# Patient Record
Sex: MALE | Race: WHITE | NOT HISPANIC OR LATINO | Employment: OTHER | URBAN - METROPOLITAN AREA
[De-identification: names, ages, dates, MRNs, and addresses within clinical notes are randomized per-mention and may not be internally consistent; named-entity substitution may affect disease eponyms.]

---

## 2017-05-22 ENCOUNTER — ALLSCRIPTS OFFICE VISIT (OUTPATIENT)
Dept: OTHER | Facility: OTHER | Age: 62
End: 2017-05-22

## 2017-05-22 DIAGNOSIS — E78.00 PURE HYPERCHOLESTEROLEMIA: ICD-10-CM

## 2017-05-22 DIAGNOSIS — Z12.5 ENCOUNTER FOR SCREENING FOR MALIGNANT NEOPLASM OF PROSTATE: ICD-10-CM

## 2017-05-22 DIAGNOSIS — I10 ESSENTIAL (PRIMARY) HYPERTENSION: ICD-10-CM

## 2017-05-22 DIAGNOSIS — E79.0 HYPERURICEMIA WITHOUT SIGNS OF INFLAMMATORY ARTHRITIS AND TOPHACEOUS DISEASE: ICD-10-CM

## 2017-10-11 ENCOUNTER — ALLSCRIPTS OFFICE VISIT (OUTPATIENT)
Dept: OTHER | Facility: OTHER | Age: 62
End: 2017-10-11

## 2017-10-11 ENCOUNTER — TRANSCRIBE ORDERS (OUTPATIENT)
Dept: ADMINISTRATIVE | Age: 62
End: 2017-10-11

## 2017-10-11 ENCOUNTER — APPOINTMENT (OUTPATIENT)
Dept: RADIOLOGY | Age: 62
End: 2017-10-11
Payer: COMMERCIAL

## 2017-10-11 DIAGNOSIS — I10 ESSENTIAL (PRIMARY) HYPERTENSION: ICD-10-CM

## 2017-10-11 DIAGNOSIS — M10.9 GOUT: ICD-10-CM

## 2017-10-11 PROCEDURE — 73630 X-RAY EXAM OF FOOT: CPT

## 2017-10-12 ENCOUNTER — TRANSCRIBE ORDERS (OUTPATIENT)
Dept: LAB | Age: 62
End: 2017-10-12

## 2017-10-12 ENCOUNTER — APPOINTMENT (OUTPATIENT)
Dept: LAB | Age: 62
End: 2017-10-12
Payer: COMMERCIAL

## 2017-10-12 DIAGNOSIS — M10.9 GOUT: ICD-10-CM

## 2017-10-12 DIAGNOSIS — I10 ESSENTIAL (PRIMARY) HYPERTENSION: ICD-10-CM

## 2017-10-12 LAB
ANION GAP SERPL CALCULATED.3IONS-SCNC: 9 MMOL/L (ref 4–13)
BUN SERPL-MCNC: 18 MG/DL (ref 5–25)
CALCIUM SERPL-MCNC: 9.1 MG/DL (ref 8.3–10.1)
CHLORIDE SERPL-SCNC: 105 MMOL/L (ref 100–108)
CO2 SERPL-SCNC: 23 MMOL/L (ref 21–32)
CREAT SERPL-MCNC: 1.29 MG/DL (ref 0.6–1.3)
GFR SERPL CREATININE-BSD FRML MDRD: 59 ML/MIN/1.73SQ M
GLUCOSE P FAST SERPL-MCNC: 130 MG/DL (ref 65–99)
POTASSIUM SERPL-SCNC: 4.9 MMOL/L (ref 3.5–5.3)
SODIUM SERPL-SCNC: 137 MMOL/L (ref 136–145)
URATE SERPL-MCNC: 8.1 MG/DL (ref 4.2–8)

## 2017-10-12 PROCEDURE — 80048 BASIC METABOLIC PNL TOTAL CA: CPT

## 2017-10-12 PROCEDURE — 84550 ASSAY OF BLOOD/URIC ACID: CPT

## 2017-10-18 ENCOUNTER — GENERIC CONVERSION - ENCOUNTER (OUTPATIENT)
Dept: OTHER | Facility: OTHER | Age: 62
End: 2017-10-18

## 2017-11-06 ENCOUNTER — APPOINTMENT (OUTPATIENT)
Dept: LAB | Age: 62
End: 2017-11-06
Payer: COMMERCIAL

## 2017-11-06 DIAGNOSIS — Z12.5 ENCOUNTER FOR SCREENING FOR MALIGNANT NEOPLASM OF PROSTATE: ICD-10-CM

## 2017-11-06 DIAGNOSIS — E79.0 HYPERURICEMIA WITHOUT SIGNS OF INFLAMMATORY ARTHRITIS AND TOPHACEOUS DISEASE: ICD-10-CM

## 2017-11-06 DIAGNOSIS — I10 ESSENTIAL (PRIMARY) HYPERTENSION: ICD-10-CM

## 2017-11-06 DIAGNOSIS — E78.00 PURE HYPERCHOLESTEROLEMIA: ICD-10-CM

## 2017-11-06 LAB
ALBUMIN SERPL BCP-MCNC: 3.6 G/DL (ref 3.5–5)
ALP SERPL-CCNC: 95 U/L (ref 46–116)
ALT SERPL W P-5'-P-CCNC: 38 U/L (ref 12–78)
ANION GAP SERPL CALCULATED.3IONS-SCNC: 6 MMOL/L (ref 4–13)
AST SERPL W P-5'-P-CCNC: 20 U/L (ref 5–45)
BASOPHILS # BLD AUTO: 0.07 THOUSANDS/ΜL (ref 0–0.1)
BASOPHILS NFR BLD AUTO: 1 % (ref 0–1)
BILIRUB SERPL-MCNC: 0.75 MG/DL (ref 0.2–1)
BILIRUB UR QL STRIP: NEGATIVE
BUN SERPL-MCNC: 16 MG/DL (ref 5–25)
CALCIUM SERPL-MCNC: 8.9 MG/DL (ref 8.3–10.1)
CHLORIDE SERPL-SCNC: 109 MMOL/L (ref 100–108)
CHOLEST SERPL-MCNC: 173 MG/DL (ref 50–200)
CLARITY UR: CLEAR
CO2 SERPL-SCNC: 26 MMOL/L (ref 21–32)
COLOR UR: YELLOW
CREAT SERPL-MCNC: 1.28 MG/DL (ref 0.6–1.3)
EOSINOPHIL # BLD AUTO: 0.67 THOUSAND/ΜL (ref 0–0.61)
EOSINOPHIL NFR BLD AUTO: 11 % (ref 0–6)
ERYTHROCYTE [DISTWIDTH] IN BLOOD BY AUTOMATED COUNT: 12.3 % (ref 11.6–15.1)
GFR SERPL CREATININE-BSD FRML MDRD: 60 ML/MIN/1.73SQ M
GLUCOSE P FAST SERPL-MCNC: 93 MG/DL (ref 65–99)
GLUCOSE UR STRIP-MCNC: NEGATIVE MG/DL
HCT VFR BLD AUTO: 42.4 % (ref 36.5–49.3)
HDLC SERPL-MCNC: 34 MG/DL (ref 40–60)
HGB BLD-MCNC: 14.5 G/DL (ref 12–17)
HGB UR QL STRIP.AUTO: NEGATIVE
KETONES UR STRIP-MCNC: NEGATIVE MG/DL
LDLC SERPL CALC-MCNC: 108 MG/DL (ref 0–100)
LEUKOCYTE ESTERASE UR QL STRIP: NEGATIVE
LYMPHOCYTES # BLD AUTO: 2.64 THOUSANDS/ΜL (ref 0.6–4.47)
LYMPHOCYTES NFR BLD AUTO: 41 % (ref 14–44)
MCH RBC QN AUTO: 30.5 PG (ref 26.8–34.3)
MCHC RBC AUTO-ENTMCNC: 34.2 G/DL (ref 31.4–37.4)
MCV RBC AUTO: 89 FL (ref 82–98)
MONOCYTES # BLD AUTO: 0.44 THOUSAND/ΜL (ref 0.17–1.22)
MONOCYTES NFR BLD AUTO: 7 % (ref 4–12)
NEUTROPHILS # BLD AUTO: 2.56 THOUSANDS/ΜL (ref 1.85–7.62)
NEUTS SEG NFR BLD AUTO: 40 % (ref 43–75)
NITRITE UR QL STRIP: NEGATIVE
NRBC BLD AUTO-RTO: 0 /100 WBCS
PH UR STRIP.AUTO: 6.5 [PH] (ref 4.5–8)
PLATELET # BLD AUTO: 198 THOUSANDS/UL (ref 149–390)
PMV BLD AUTO: 10.8 FL (ref 8.9–12.7)
POTASSIUM SERPL-SCNC: 4.6 MMOL/L (ref 3.5–5.3)
PROT SERPL-MCNC: 7.3 G/DL (ref 6.4–8.2)
PROT UR STRIP-MCNC: NEGATIVE MG/DL
PSA SERPL-MCNC: 0.3 NG/ML (ref 0–4)
RBC # BLD AUTO: 4.75 MILLION/UL (ref 3.88–5.62)
SODIUM SERPL-SCNC: 141 MMOL/L (ref 136–145)
SP GR UR STRIP.AUTO: 1.02 (ref 1–1.03)
TRIGL SERPL-MCNC: 156 MG/DL
TSH SERPL DL<=0.05 MIU/L-ACNC: 3.09 UIU/ML (ref 0.36–3.74)
URATE SERPL-MCNC: 7.7 MG/DL (ref 4.2–8)
UROBILINOGEN UR QL STRIP.AUTO: 1 E.U./DL
WBC # BLD AUTO: 6.4 THOUSAND/UL (ref 4.31–10.16)

## 2017-11-06 PROCEDURE — 80061 LIPID PANEL: CPT

## 2017-11-06 PROCEDURE — 85025 COMPLETE CBC W/AUTO DIFF WBC: CPT

## 2017-11-06 PROCEDURE — G0103 PSA SCREENING: HCPCS

## 2017-11-06 PROCEDURE — 36415 COLL VENOUS BLD VENIPUNCTURE: CPT

## 2017-11-06 PROCEDURE — 80053 COMPREHEN METABOLIC PANEL: CPT

## 2017-11-06 PROCEDURE — 84443 ASSAY THYROID STIM HORMONE: CPT

## 2017-11-06 PROCEDURE — 84550 ASSAY OF BLOOD/URIC ACID: CPT

## 2017-11-06 PROCEDURE — 81003 URINALYSIS AUTO W/O SCOPE: CPT

## 2017-11-29 ENCOUNTER — ALLSCRIPTS OFFICE VISIT (OUTPATIENT)
Dept: OTHER | Facility: OTHER | Age: 62
End: 2017-11-29

## 2017-11-29 DIAGNOSIS — E79.0 HYPERURICEMIA WITHOUT SIGNS OF INFLAMMATORY ARTHRITIS AND TOPHACEOUS DISEASE: ICD-10-CM

## 2017-11-29 DIAGNOSIS — I10 ESSENTIAL (PRIMARY) HYPERTENSION: ICD-10-CM

## 2018-01-13 VITALS
HEART RATE: 80 BPM | SYSTOLIC BLOOD PRESSURE: 130 MMHG | RESPIRATION RATE: 16 BRPM | WEIGHT: 255.4 LBS | HEIGHT: 70 IN | DIASTOLIC BLOOD PRESSURE: 70 MMHG | OXYGEN SATURATION: 98 % | BODY MASS INDEX: 36.56 KG/M2 | TEMPERATURE: 99.6 F

## 2018-01-14 VITALS
HEIGHT: 70 IN | HEART RATE: 78 BPM | OXYGEN SATURATION: 98 % | WEIGHT: 257.4 LBS | DIASTOLIC BLOOD PRESSURE: 68 MMHG | SYSTOLIC BLOOD PRESSURE: 118 MMHG | BODY MASS INDEX: 36.85 KG/M2 | TEMPERATURE: 98.4 F

## 2018-01-14 VITALS
BODY MASS INDEX: 36 KG/M2 | TEMPERATURE: 98.6 F | HEIGHT: 71 IN | HEART RATE: 84 BPM | DIASTOLIC BLOOD PRESSURE: 66 MMHG | OXYGEN SATURATION: 97 % | WEIGHT: 257.13 LBS | SYSTOLIC BLOOD PRESSURE: 118 MMHG

## 2018-01-18 NOTE — MISCELLANEOUS
Message  Return to work or school:   Yoni Villalobos is under my professional care  He was seen in my office on 9/14/16   He is able to return to work on  9/16/16      His absence due to illness          Future Appointments    Signatures   Electronically signed by : Conner De Leon MD; Sep 14 2016  6:10PM EST                       (Author)

## 2018-01-22 VITALS
OXYGEN SATURATION: 99 % | SYSTOLIC BLOOD PRESSURE: 122 MMHG | TEMPERATURE: 98.7 F | HEART RATE: 72 BPM | BODY MASS INDEX: 35.7 KG/M2 | WEIGHT: 255 LBS | HEIGHT: 71 IN | DIASTOLIC BLOOD PRESSURE: 76 MMHG

## 2018-03-05 ENCOUNTER — APPOINTMENT (OUTPATIENT)
Dept: LAB | Facility: MEDICAL CENTER | Age: 63
End: 2018-03-05
Payer: COMMERCIAL

## 2018-03-05 DIAGNOSIS — I10 ESSENTIAL (PRIMARY) HYPERTENSION: ICD-10-CM

## 2018-03-05 DIAGNOSIS — E79.0 HYPERURICEMIA WITHOUT SIGNS OF INFLAMMATORY ARTHRITIS AND TOPHACEOUS DISEASE: ICD-10-CM

## 2018-03-05 LAB
ANION GAP SERPL CALCULATED.3IONS-SCNC: 6 MMOL/L (ref 4–13)
BUN SERPL-MCNC: 24 MG/DL (ref 5–25)
CALCIUM SERPL-MCNC: 8.7 MG/DL (ref 8.3–10.1)
CHLORIDE SERPL-SCNC: 107 MMOL/L (ref 100–108)
CO2 SERPL-SCNC: 27 MMOL/L (ref 21–32)
CREAT SERPL-MCNC: 1.23 MG/DL (ref 0.6–1.3)
GFR SERPL CREATININE-BSD FRML MDRD: 63 ML/MIN/1.73SQ M
GLUCOSE SERPL-MCNC: 84 MG/DL (ref 65–140)
POTASSIUM SERPL-SCNC: 4.8 MMOL/L (ref 3.5–5.3)
SODIUM SERPL-SCNC: 140 MMOL/L (ref 136–145)
URATE SERPL-MCNC: 5.9 MG/DL (ref 4.2–8)

## 2018-03-05 PROCEDURE — 84550 ASSAY OF BLOOD/URIC ACID: CPT

## 2018-03-05 PROCEDURE — 80048 BASIC METABOLIC PNL TOTAL CA: CPT

## 2018-03-05 PROCEDURE — 36415 COLL VENOUS BLD VENIPUNCTURE: CPT

## 2018-03-28 ENCOUNTER — OFFICE VISIT (OUTPATIENT)
Dept: INTERNAL MEDICINE CLINIC | Facility: CLINIC | Age: 63
End: 2018-03-28
Payer: COMMERCIAL

## 2018-03-28 VITALS
OXYGEN SATURATION: 99 % | WEIGHT: 264 LBS | HEART RATE: 90 BPM | DIASTOLIC BLOOD PRESSURE: 78 MMHG | RESPIRATION RATE: 20 BRPM | HEIGHT: 72 IN | TEMPERATURE: 98.2 F | SYSTOLIC BLOOD PRESSURE: 140 MMHG | BODY MASS INDEX: 35.76 KG/M2

## 2018-03-28 DIAGNOSIS — Z11.59 NEED FOR HEPATITIS C SCREENING TEST: ICD-10-CM

## 2018-03-28 DIAGNOSIS — I10 BENIGN ESSENTIAL HYPERTENSION: ICD-10-CM

## 2018-03-28 DIAGNOSIS — E66.09 CLASS 2 OBESITY DUE TO EXCESS CALORIES WITHOUT SERIOUS COMORBIDITY WITH BODY MASS INDEX (BMI) OF 36.0 TO 36.9 IN ADULT: ICD-10-CM

## 2018-03-28 DIAGNOSIS — M10.9 ACUTE GOUTY ARTHRITIS: ICD-10-CM

## 2018-03-28 DIAGNOSIS — E79.0 HYPERURICEMIA: Primary | ICD-10-CM

## 2018-03-28 DIAGNOSIS — E78.00 HYPERCHOLESTEROLEMIA: ICD-10-CM

## 2018-03-28 DIAGNOSIS — I10 HYPERTENSION, UNSPECIFIED TYPE: Primary | ICD-10-CM

## 2018-03-28 DIAGNOSIS — Z87.39 HISTORY OF GOUT: ICD-10-CM

## 2018-03-28 PROBLEM — E66.812 CLASS 2 OBESITY DUE TO EXCESS CALORIES WITHOUT SERIOUS COMORBIDITY WITH BODY MASS INDEX (BMI) OF 36.0 TO 36.9 IN ADULT: Status: ACTIVE | Noted: 2018-03-28

## 2018-03-28 PROCEDURE — 99214 OFFICE O/P EST MOD 30 MIN: CPT | Performed by: PHYSICIAN ASSISTANT

## 2018-03-28 RX ORDER — OLMESARTAN MEDOXOMIL 20 MG/1
20 TABLET ORAL DAILY
Qty: 90 TABLET | Refills: 1 | Status: SHIPPED | OUTPATIENT
Start: 2018-03-28 | End: 2018-12-05 | Stop reason: SDUPTHER

## 2018-03-28 RX ORDER — DOXAZOSIN 8 MG/1
8 TABLET ORAL DAILY
Qty: 90 TABLET | Refills: 1 | Status: SHIPPED | OUTPATIENT
Start: 2018-03-28 | End: 2018-03-28 | Stop reason: SDUPTHER

## 2018-03-28 RX ORDER — AMPICILLIN TRIHYDRATE 250 MG
2 CAPSULE ORAL DAILY
COMMUNITY
End: 2022-03-11 | Stop reason: ALTCHOICE

## 2018-03-28 RX ORDER — CHLORAL HYDRATE 500 MG
1 CAPSULE ORAL DAILY
COMMUNITY

## 2018-03-28 RX ORDER — ALLOPURINOL 300 MG/1
1 TABLET ORAL DAILY
COMMUNITY
Start: 2017-11-29 | End: 2018-03-28 | Stop reason: SDUPTHER

## 2018-03-28 RX ORDER — DOXAZOSIN 8 MG/1
1 TABLET ORAL DAILY
COMMUNITY
End: 2018-03-28 | Stop reason: SDUPTHER

## 2018-03-28 RX ORDER — OLMESARTAN MEDOXOMIL 20 MG/1
TABLET ORAL
COMMUNITY
Start: 2015-07-16 | End: 2018-03-28 | Stop reason: SDUPTHER

## 2018-03-28 RX ORDER — ALLOPURINOL 300 MG/1
300 TABLET ORAL DAILY
Qty: 90 TABLET | Refills: 1 | Status: SHIPPED | OUTPATIENT
Start: 2018-03-28 | End: 2018-12-03 | Stop reason: SDUPTHER

## 2018-03-28 RX ORDER — DOXAZOSIN 8 MG/1
8 TABLET ORAL DAILY
Qty: 90 TABLET | Refills: 0 | Status: SHIPPED | OUTPATIENT
Start: 2018-03-28 | End: 2019-02-13 | Stop reason: SDUPTHER

## 2018-03-28 RX ORDER — DOXAZOSIN 8 MG/1
8 TABLET ORAL DAILY
Qty: 90 TABLET | Refills: 1 | Status: CANCELLED | OUTPATIENT
Start: 2018-03-28

## 2018-03-28 NOTE — ASSESSMENT & PLAN NOTE
Review previous lipid panel with patient  Encouraged low-fat low-cholesterol diet  Patient currently is taking omega-3 fatty oils and red yeast rice  Will follow lipid panel with next lab set  Over-the-counter prep plant sterols can also help as a natural way to lower cholesterol

## 2018-03-28 NOTE — ASSESSMENT & PLAN NOTE
Blood pressure previously well controlled on medications  Patient taking Cardura  And Benicar  Encouraged weight loss low-salt diet  Will not adjust blood pressure medications at this time however discussed if blood pressure is not at goal at next visit we will increase Benicar to 40 mg daily  Patient is agreeable  Labs to be done prior to next visit

## 2018-03-28 NOTE — PATIENT INSTRUCTIONS
Benign essential hypertension  Blood pressure previously well controlled on medications  Patient taking Cardura  And Benicar  Encouraged weight loss low-salt diet  Will not adjust blood pressure medications at this time however discussed if blood pressure is not at goal at next visit we will increase Benicar to 40 mg daily  Patient is agreeable  Labs to be done prior to next visit  History of gout    Acute gouty flare has since resolved  Uric acid recently checked  Treated to goal on allopurinol  Renal function stable  Increased hydration and fluids to prevent gout attacks  Discussed gout diet handout given    Hypercholesterolemia   Review previous lipid panel with patient  Encouraged low-fat low-cholesterol diet  Patient currently is taking omega-3 fatty oils and red yeast rice  Will follow lipid panel with next lab set  Over-the-counter prep plant sterols can also help as a natural way to lower cholesterol  Hyperuricemia  Educated on cause of gout  Discussed importance of diet and lifestyle modifications along with medications to improve symptoms and prevent reoccurrence  Rest, ice, elevate and avoid pressure on the affected area  Take meds as directed  Follow diet below  What to do:   Drink plenty of fluids  An increase in water consumption has been linked to fewer gout attacks  Aim for eight to 16 glasses of fluids a day with at least half of that as water   Limit purine-rich foods (organ meats, beef, lamb, pork, shellfish)   Low carb vegetables    Low-fat, nonfat daily   Limit daily proteins from lean meat, fish and poultry to 4 to 6 ounces  Add protein to your diet with low-fat or fat-free dairy products, such as low-fat yogurt or skim milk, which are associated with reduced uric acid levels   Supplement Vitamin C may help lower uric acid levels  Things to Avoid:   Avoid alcohol (especially beer) which increases uric acid and contributes to dehydration   Avoid beverages sweetened with high fructose corn syrup   Avoid organ and glandular meats  Avoid meats such as liver, kidney and sweetbreads, which have high purine levels and contribute to high blood levels of uric acid   Avoid Selected seafood which are higher in purines than others: anchovies, herring, sardines, mussels, scallops, trout, osvaldo, mackerel and tuna    Gout   AMBULATORY CARE:   Gout  is a form of arthritis that causes severe joint pain and stiffness  Acute gout pain starts suddenly, gets worse quickly, and stops on its own  Acute gout can become chronic and cause permanent damage to your joints  Seek care immediately if:   · You have severe pain in one or more of your joints that you cannot tolerate  · You have a fever or redness that spreads beyond the joint area  Contact your healthcare provider if:   · You have new symptoms, such as a rash, after you start gout treatment  · Your joint pain and swelling do not go away, even after treatment  · You are not urinating as much or as often as you usually do  · You have trouble taking your gout medicines  · You have questions or concerns about your condition or care  Stages of gout:   · Hyperuricemia  starts with high levels of uric acid  Hyperuricemia is not gout, but it increases your risk for gout  You may have no symptoms at this stage, and it usually does not need treatment  · Acute gouty arthritis  starts with a sudden attack of pain and swelling, usually in 1 joint  The attack may last from a few days to 2 weeks  · Intercritical gout  is the time between attacks  You may go months or years without another attack  You will not have joint pain or stiffness, but this does not mean your gout is cured  You will still need treatment to prevent chronic gout  · Chronic tophaceous gout  develops if gout is not treated  Large amounts of uric acid crystals, called tophi, collect around your joints   The crystals can destroy or deform the joints  Gout attacks occur more often, and last hours to weeks  More than 1 joint may be painful and swollen  At this stage, gout symptoms do not go away on their own  Treatment  can make your symptoms stop sooner, prevent attacks, and decrease your risk of joint damage  You may need any of the following:  · Medicines:      ¨ NSAIDs , such as ibuprofen, help decrease swelling, pain, and fever  This medicine is available with or without a doctor's order  NSAIDs can cause stomach bleeding or kidney problems in certain people  If you take blood thinner medicine, always ask your healthcare provider if NSAIDs are safe for you  Always read the medicine label and follow directions  ¨ Gout medicine  decreases joint pain and swelling  It may also be given to prevent new gout attacks  ¨ Steroids  reduce inflammation and can help your joint stiffness and pain during gout attacks  ¨ Uric acid medicine  may be given to reduce the amount of uric acid your body makes  Some medicines may help you pass more uric acid when you urinate  ¨ Take your medicine as directed  Contact your healthcare provider if you think your medicine is not helping or if you have side effects  Tell him or her if you are allergic to any medicine  Keep a list of the medicines, vitamins, and herbs you take  Include the amounts, and when and why you take them  Bring the list or the pill bottles to follow-up visits  Carry your medicine list with you in case of an emergency  · Surgery  called a bone graft may be needed for tophi that are painful or infected  Bone in the joint may be replaced with bone taken from another place in your body  Ask your healthcare provider for more information about bone graft surgery  Manage gout:   · Rest your painful joint so it can heal   Your healthcare provider may recommend crutches or a walker if the affected joint is in a leg  · Apply ice to your joint    Ice decreases pain and swelling  Use an ice pack, or put crushed ice in a plastic bag  Cover the ice pack or bag with a towel before you apply it to your painful joint  Apply ice for 15 to 20 minutes every hour, or as directed  · Elevate your joint  Elevation helps reduce swelling and pain  Raise your joint above the level of your heart as often as you can  Prop your painful joint on pillows to keep it above your heart comfortably  · Go to physical therapy if directed  A physical therapist can teach you exercises to improve flexibility and range of motion  Prevent gout attacks:   · Do not eat high-purine foods  These foods include meats, seafood, asparagus, spinach, cauliflower, and some types of beans  Healthcare providers may tell you to eat more low-fat milk products, such as yogurt  Milk products may decrease your risk for gout attacks  Vitamin C and coffee may also help  Your healthcare provider or dietitian can help you create a meal plan  · Drink more liquids as directed  Liquids such as water help remove uric acid from your body  Ask how much liquid to drink each day and which liquids are best for you  · Manage your weight  Weight loss may decrease the amount of uric acid in your body  Exercise can help you lose weight  Talk to your healthcare provider about the best exercises for you  · Control your blood sugar level if you have diabetes  Keep your blood sugar level in a normal range  This can help prevent gout attacks  · Limit or do not drink alcohol as directed  Alcohol can trigger a gout attack  Alcohol also increases your risk for dehydration  Ask your healthcare provider if alcohol is safe for you  Follow up with your healthcare provider as directed:  Write down your questions so you remember to ask them during your visits  © 2017 [employer]  Information is for End User's use only and may not be sold, redistributed or otherwise used for commercial purposes   All illustrations and images included in CareNotes® are the copyrighted property of [employer]  or [employer]  The above information is an  only  It is not intended as medical advice for individual conditions or treatments  Talk to your doctor, nurse or pharmacist before following any medical regimen to see if it is safe and effective for you

## 2018-03-28 NOTE — ASSESSMENT & PLAN NOTE
Acute gouty flare has since resolved  Uric acid recently checked  Treated to goal on allopurinol  Renal function stable  Increased hydration and fluids to prevent gout attacks    Discussed gout diet handout given

## 2018-03-28 NOTE — PROGRESS NOTES
[facility] [place]  Standard Office Visit  Patient ID: Dannielle Figures  : 1955  Age/Gender: 58 y o  male     DATE: 3/28/2018      Assessment/Plan:    Benign essential hypertension  Blood pressure previously well controlled on medications  Patient taking Cardura  And Benicar  Encouraged weight loss low-salt diet  Will not adjust blood pressure medications at this time however discussed if blood pressure is not at goal at next visit we will increase Benicar to 40 mg daily  Patient is agreeable  Labs to be done prior to next visit  History of gout    Acute gouty flare has since resolved  Uric acid recently checked  Treated to goal on allopurinol  Renal function stable  Increased hydration and fluids to prevent gout attacks  Discussed gout diet handout given    Hypercholesterolemia   Review previous lipid panel with patient  Encouraged low-fat low-cholesterol diet  Patient currently is taking omega-3 fatty oils and red yeast rice  Will follow lipid panel with next lab set  Over-the-counter prep plant sterols can also help as a natural way to lower cholesterol  Hyperuricemia  Hydrate well, follow gout diet to avoid trigger foods  Reprot if sx develop  Class 2 obesity due to excess calories without serious comorbidity with body mass index (BMI) of 36 0 to 36 9 in adult  Set 5 lb weight loss goal for next visit  Discussed diet and lifestyle to help  Need for hepatitis C screening test   Had screening blood test       Diagnoses and all orders for this visit:    Hyperuricemia  -     Uric acid; Future  -     allopurinol (ZYLOPRIM) 300 mg tablet; Take 1 tablet (300 mg total) by mouth daily    Hypercholesterolemia  -     Comprehensive metabolic panel; Future  -     Lipid Panel with Direct LDL reflex; Future  -     CBC and differential; Future    Benign essential hypertension  -     Comprehensive metabolic panel;  Future  - Lipid Panel with Direct LDL reflex; Future  -     CBC and differential; Future  -     Discontinue: doxazosin (CARDURA) 8 MG tablet; Take 1 tablet (8 mg total) by mouth daily  -     doxazosin (CARDURA) 8 MG tablet; Take 1 tablet (8 mg total) by mouth daily  -     olmesartan (BENICAR) 20 mg tablet; Take 1 tablet (20 mg total) by mouth daily    Acute gouty arthritis    History of gout  -     Uric acid; Future  -     allopurinol (ZYLOPRIM) 300 mg tablet; Take 1 tablet (300 mg total) by mouth daily    Class 2 obesity due to excess calories without serious comorbidity with body mass index (BMI) of 36 0 to 36 9 in adult  -     Comprehensive metabolic panel; Future  -     Lipid Panel with Direct LDL reflex; Future    Need for hepatitis C screening test  -     Hepatitis C antibody; Future    Other orders  -     Discontinue: allopurinol (ZYLOPRIM) 300 mg tablet; Take 1 tablet by mouth daily  -     aspirin 81 MG tablet; Take 1 tablet by mouth daily  -     Discontinue: olmesartan (BENICAR) 20 mg tablet;   -     Discontinue: doxazosin (CARDURA) 8 MG tablet; Take 1 tablet by mouth daily  -     Omega-3 1000 MG CAPS; Take by mouth  -     Red Yeast Rice 600 MG CAPS; Take by mouth          Subjective:   Chief Complaint   Patient presents with    Follow-up     Pt is here for a follow up for blood work  Madyson Rodriguez  is a 58 y o  male who presents to the office on 3/28/2018 for     59 yo male for follow up  Notes he had ETOH 1-2 times per week  Last gout flare up was in 1/2018, resolved  Notes he has been taking allopurinol  Feeling better  Taking BP meds as directed  Notes he previously was talked to about starting a statin  He elected to continue with red yeast rice which she has been on for some time  Tries to manage cholesterol with diet and redness rise  Wants to know what foods he can eat to prevent gout  Notes he needs to work on his diet as he has recently gained some weight    No other complaints or concerns at this time      Hypertension   This is a chronic problem  The current episode started more than 1 year ago  The problem has been waxing and waning since onset  The problem is controlled  Pertinent negatives include no anxiety, blurred vision (recently to eye doctor ), chest pain, headaches, palpitations, peripheral edema or shortness of breath  Risk factors for coronary artery disease include male gender, obesity and diabetes mellitus  Past treatments include alpha 1 blockers and angiotensin blockers  The current treatment provides mild improvement  There are no compliance problems  There is no history of angina, kidney disease, CAD/MI or CVA  The following portions of the patient's history were reviewed and updated as appropriate: allergies, current medications, past medical history, past social history, past surgical history and problem list     Review of Systems   Eyes: Negative for blurred vision (recently to eye doctor  )  Respiratory: Negative for cough, shortness of breath and wheezing  Cardiovascular: Negative for chest pain and palpitations  Gastrointestinal: Negative for abdominal distention, diarrhea, nausea and vomiting  Genitourinary: Negative for dysuria  Has been on Cardura for some time  No urinary symptoms at this time   Musculoskeletal:        Intermittent gout flare ups as discussed in HPI  No acute gout at this time  Neurological: Negative for headaches           Patient Active Problem List   Diagnosis    Benign essential hypertension    Hypercholesterolemia    Hyperuricemia    History of gout    Need for hepatitis C screening test    Class 2 obesity due to excess calories without serious comorbidity with body mass index (BMI) of 36 0 to 36 9 in adult       Past Medical History:   Diagnosis Date    Blurred vision     Last assessed - 1/28/15    CAP (community acquired pneumonia)     Last assessed - 9/20/16    Hemorrhoids     Last assessed - 11/23/15    HTN (hypertension)     Hypercholesterolemia     IFG (impaired fasting glucose)     Last assessed - 11/18/14    Rotator cuff tendinitis     Last assessed - 6/17/15       Past Surgical History:   Procedure Laterality Date    APPENDECTOMY      Resolved - 1/24/09    COLONOSCOPY      WISDOM TOOTH EXTRACTION           Current Outpatient Prescriptions:     allopurinol (ZYLOPRIM) 300 mg tablet, Take 1 tablet (300 mg total) by mouth daily, Disp: 90 tablet, Rfl: 1    aspirin 81 MG tablet, Take 1 tablet by mouth daily, Disp: , Rfl:     doxazosin (CARDURA) 8 MG tablet, Take 1 tablet (8 mg total) by mouth daily, Disp: 90 tablet, Rfl: 0    olmesartan (BENICAR) 20 mg tablet, Take 1 tablet (20 mg total) by mouth daily, Disp: 90 tablet, Rfl: 1    Omega-3 1000 MG CAPS, Take by mouth, Disp: , Rfl:     Red Yeast Rice 600 MG CAPS, Take by mouth, Disp: , Rfl:     Allergies   Allergen Reactions    Penicillins Hives    Pollen Extract        Social History     Social History    Marital status: /Civil Union     Spouse name: N/A    Number of children: N/A    Years of education: N/A     Occupational History    [employer]     Social History Main Topics    Smoking status: Never Smoker    Smokeless tobacco: Never Used    Alcohol use Yes      Comment: Social     Drug use: No    Sexual activity: Not Asked     Other Topics Concern    None     Social History Narrative    Travel Hx - Pt denies being out of the country during 1/2014-11/18/2014           Family History   Problem Relation Age of Onset    Hyperlipidemia Mother     Hypertension Mother     Pulmonary embolism Mother     Diabetes type II Mother     Hyperlipidemia Father     Hypertension Father     Prostate cancer Father     Diabetes type II Father        Patient Care Team:  Brannon Alex MD as PCP - General    Immunization History   Administered Date(s) Administered    Influenza Quadrivalent Preservative Free 3 years and older IM 11/29/2017    Influenza Quadrivalent, 6-35 Months IM 11/21/2016    Td (adult), adsorbed 09/01/2007        Objective:  Vitals:    03/28/18 1527 03/28/18 1548   BP: 140/84 140/78   BP Location: Left arm    Patient Position: Sitting    Cuff Size: Large    Pulse: 90    Resp: 20    Temp: 98 2 °F (36 8 °C)    TempSrc: Oral    SpO2: 99%    Weight: 120 kg (264 lb)    Height: 5' 11 5" (1 816 m)      Wt Readings from Last 3 Encounters:   03/28/18 120 kg (264 lb)   11/29/17 117 kg (257 lb 2 oz)   10/18/17 116 kg (255 lb)     Body mass index is 36 31 kg/m²  No exam data present       Physical Exam   Constitutional: He is oriented to person, place, and time  He appears well-developed and well-nourished  No distress  HENT:   Head: Normocephalic and atraumatic  Mouth/Throat: No oropharyngeal exudate  Eyes: Conjunctivae are normal  Right eye exhibits no discharge  Left eye exhibits no discharge  No scleral icterus  Neck: Neck supple  Cardiovascular: Normal rate, regular rhythm and normal heart sounds  No murmur heard  Pulmonary/Chest: Effort normal and breath sounds normal  No respiratory distress  He has no wheezes  Clear to auscultation throughout   Abdominal: Soft  Bowel sounds are normal  There is no tenderness  Soft nontender nondistended   Musculoskeletal: He exhibits no edema  No lower extremity edema  Prominent MCP head left great toe without erythema or tenderness to palpation   Neurological: He is alert and oriented to person, place, and time  Skin: Skin is warm and dry  He is not diaphoretic  No causative break in the skin ulceration on the feet   Psychiatric: He has a normal mood and affect  His behavior is normal  Thought content normal    Nursing note and vitals reviewed            Health Maintenance   Topic Date Due    HIV SCREENING  1955    Hepatitis C Screening  1955    PNEUMOCOCCAL POLYSACCHARIDE VACCINE AGE 2-64 HIGH RISK  08/06/1957    DTaP,Tdap,and Td Vaccines (1 - Tdap) 09/02/2007  Depression Screening PHQ-9  11/29/2018    COLONOSCOPY  11/20/2019    INFLUENZA VACCINE  Completed     Future Appointments  Date Time Provider Department Center   10/1/2018 4:30 PM Sy Matute PA-C [address] [place] [address]  Elliott Villagomez [facility]

## 2018-06-06 NOTE — TELEPHONE ENCOUNTER
Pt called requesting refill of Benicar  Rx sent for 90 days and 1 refill on 3/28/18 to [facility] in [place]  Pt stills has refill remaining

## 2018-09-26 ENCOUNTER — APPOINTMENT (OUTPATIENT)
Dept: LAB | Age: 63
End: 2018-09-26
Payer: COMMERCIAL

## 2018-09-26 DIAGNOSIS — Z87.39 HISTORY OF GOUT: ICD-10-CM

## 2018-09-26 DIAGNOSIS — I10 BENIGN ESSENTIAL HYPERTENSION: ICD-10-CM

## 2018-09-26 DIAGNOSIS — E66.09 CLASS 2 OBESITY DUE TO EXCESS CALORIES WITHOUT SERIOUS COMORBIDITY WITH BODY MASS INDEX (BMI) OF 36.0 TO 36.9 IN ADULT: ICD-10-CM

## 2018-09-26 DIAGNOSIS — E79.0 HYPERURICEMIA: ICD-10-CM

## 2018-09-26 DIAGNOSIS — Z11.59 NEED FOR HEPATITIS C SCREENING TEST: ICD-10-CM

## 2018-09-26 DIAGNOSIS — E78.00 HYPERCHOLESTEROLEMIA: ICD-10-CM

## 2018-09-26 LAB
ALBUMIN SERPL BCP-MCNC: 3.5 G/DL (ref 3.5–5)
ALP SERPL-CCNC: 102 U/L (ref 46–116)
ALT SERPL W P-5'-P-CCNC: 49 U/L (ref 12–78)
ANION GAP SERPL CALCULATED.3IONS-SCNC: 6 MMOL/L (ref 4–13)
AST SERPL W P-5'-P-CCNC: 28 U/L (ref 5–45)
BASOPHILS # BLD AUTO: 0.08 THOUSANDS/ΜL (ref 0–0.1)
BASOPHILS NFR BLD AUTO: 1 % (ref 0–1)
BILIRUB SERPL-MCNC: 1.12 MG/DL (ref 0.2–1)
BUN SERPL-MCNC: 15 MG/DL (ref 5–25)
CALCIUM SERPL-MCNC: 8.8 MG/DL (ref 8.3–10.1)
CHLORIDE SERPL-SCNC: 108 MMOL/L (ref 100–108)
CHOLEST SERPL-MCNC: 153 MG/DL (ref 50–200)
CO2 SERPL-SCNC: 26 MMOL/L (ref 21–32)
CREAT SERPL-MCNC: 1.21 MG/DL (ref 0.6–1.3)
EOSINOPHIL # BLD AUTO: 0.48 THOUSAND/ΜL (ref 0–0.61)
EOSINOPHIL NFR BLD AUTO: 7 % (ref 0–6)
ERYTHROCYTE [DISTWIDTH] IN BLOOD BY AUTOMATED COUNT: 13.2 % (ref 11.6–15.1)
GFR SERPL CREATININE-BSD FRML MDRD: 63 ML/MIN/1.73SQ M
GLUCOSE P FAST SERPL-MCNC: 97 MG/DL (ref 65–99)
HCT VFR BLD AUTO: 41.4 % (ref 36.5–49.3)
HCV AB SER QL: NORMAL
HDLC SERPL-MCNC: 35 MG/DL (ref 40–60)
HGB BLD-MCNC: 13.9 G/DL (ref 12–17)
IMM GRANULOCYTES # BLD AUTO: 0.01 THOUSAND/UL (ref 0–0.2)
IMM GRANULOCYTES NFR BLD AUTO: 0 % (ref 0–2)
LDLC SERPL CALC-MCNC: 96 MG/DL (ref 0–100)
LYMPHOCYTES # BLD AUTO: 1.99 THOUSANDS/ΜL (ref 0.6–4.47)
LYMPHOCYTES NFR BLD AUTO: 28 % (ref 14–44)
MCH RBC QN AUTO: 31.8 PG (ref 26.8–34.3)
MCHC RBC AUTO-ENTMCNC: 33.6 G/DL (ref 31.4–37.4)
MCV RBC AUTO: 95 FL (ref 82–98)
MONOCYTES # BLD AUTO: 0.55 THOUSAND/ΜL (ref 0.17–1.22)
MONOCYTES NFR BLD AUTO: 8 % (ref 4–12)
NEUTROPHILS # BLD AUTO: 4.09 THOUSANDS/ΜL (ref 1.85–7.62)
NEUTS SEG NFR BLD AUTO: 56 % (ref 43–75)
NRBC BLD AUTO-RTO: 0 /100 WBCS
PLATELET # BLD AUTO: 181 THOUSANDS/UL (ref 149–390)
PMV BLD AUTO: 11.5 FL (ref 8.9–12.7)
POTASSIUM SERPL-SCNC: 4.5 MMOL/L (ref 3.5–5.3)
PROT SERPL-MCNC: 7 G/DL (ref 6.4–8.2)
RBC # BLD AUTO: 4.37 MILLION/UL (ref 3.88–5.62)
SODIUM SERPL-SCNC: 140 MMOL/L (ref 136–145)
TRIGL SERPL-MCNC: 109 MG/DL
URATE SERPL-MCNC: 4.8 MG/DL (ref 4.2–8)
WBC # BLD AUTO: 7.2 THOUSAND/UL (ref 4.31–10.16)

## 2018-09-26 PROCEDURE — 80061 LIPID PANEL: CPT

## 2018-09-26 PROCEDURE — 85025 COMPLETE CBC W/AUTO DIFF WBC: CPT

## 2018-09-26 PROCEDURE — 36415 COLL VENOUS BLD VENIPUNCTURE: CPT

## 2018-09-26 PROCEDURE — 84550 ASSAY OF BLOOD/URIC ACID: CPT

## 2018-09-26 PROCEDURE — 86803 HEPATITIS C AB TEST: CPT

## 2018-09-26 PROCEDURE — 80053 COMPREHEN METABOLIC PANEL: CPT

## 2018-10-05 ENCOUNTER — OFFICE VISIT (OUTPATIENT)
Dept: INTERNAL MEDICINE CLINIC | Facility: CLINIC | Age: 63
End: 2018-10-05
Payer: COMMERCIAL

## 2018-10-05 VITALS
HEART RATE: 96 BPM | DIASTOLIC BLOOD PRESSURE: 62 MMHG | BODY MASS INDEX: 37.65 KG/M2 | TEMPERATURE: 98.2 F | SYSTOLIC BLOOD PRESSURE: 130 MMHG | HEIGHT: 70 IN | OXYGEN SATURATION: 98 % | WEIGHT: 263 LBS

## 2018-10-05 DIAGNOSIS — E79.0 HYPERURICEMIA: ICD-10-CM

## 2018-10-05 DIAGNOSIS — E78.00 HYPERCHOLESTEROLEMIA: ICD-10-CM

## 2018-10-05 DIAGNOSIS — Z87.39 HISTORY OF GOUT: ICD-10-CM

## 2018-10-05 DIAGNOSIS — E66.09 CLASS 2 OBESITY DUE TO EXCESS CALORIES WITHOUT SERIOUS COMORBIDITY WITH BODY MASS INDEX (BMI) OF 36.0 TO 36.9 IN ADULT: ICD-10-CM

## 2018-10-05 DIAGNOSIS — Z11.59 NEED FOR HEPATITIS C SCREENING TEST: Primary | ICD-10-CM

## 2018-10-05 DIAGNOSIS — I10 BENIGN ESSENTIAL HYPERTENSION: ICD-10-CM

## 2018-10-05 PROCEDURE — 1036F TOBACCO NON-USER: CPT | Performed by: PHYSICIAN ASSISTANT

## 2018-10-05 PROCEDURE — 3078F DIAST BP <80 MM HG: CPT | Performed by: PHYSICIAN ASSISTANT

## 2018-10-05 PROCEDURE — 3075F SYST BP GE 130 - 139MM HG: CPT | Performed by: PHYSICIAN ASSISTANT

## 2018-10-05 PROCEDURE — 99214 OFFICE O/P EST MOD 30 MIN: CPT | Performed by: PHYSICIAN ASSISTANT

## 2018-10-05 NOTE — PATIENT INSTRUCTIONS
Need for hepatitis C screening test  No further screening indicated  Negative 9/26/18    Hyperuricemia  Uric acid normal on allopurinol  Will resolve this issue  History of gout  No recent gout flares  Uric acid at tx goal      Benign essential hypertension  Repeat blood pressure improved  Currently taking doxazosin 8 mg daily as well as Benicar 20 mg daily  Advised him to check his blood pressure at home to help evaluate control of blood pressure between office visits  Patient has been on doxyzocin for some time since his 35s or 45s  Discussed with time may need to consider transitioning away from this medicine if it starts to cause any lightheadedness or dizziness    Class 2 obesity due to excess calories without serious comorbidity with body mass index (BMI) of 36 0 to 36 9 in adult   Encouraged low-fat low-cholesterol diet can help improve cholesterol levels  Hypercholesterolemia   Patient currently taking red yeast rice and fish oils  Give encouragement with low-fat low-cholesterol diet  Cholesterol levels did improved since last visit  Discussed statin which patient is not interested in at this time  Will continue to follow    Follow up with laboratory studies in 6 months

## 2018-10-05 NOTE — ASSESSMENT & PLAN NOTE
Patient currently taking red yeast rice and fish oils  Give encouragement with low-fat low-cholesterol diet  Cholesterol levels did improved since last visit  Reviewed ASCVD score and recommended statin which patient is not interested in at this time  Will continue to follow    Follow up with laboratory studies in 6 months

## 2018-10-05 NOTE — PROGRESS NOTES
[facility] [place]  Patient ID: Dannielle Figures  : 1955  Age/Gender: 61 y o  male     DATE: 10/5/2018      Assessment/Plan:    Need for hepatitis C screening test  No further screening indicated  Negative 18    Hyperuricemia  Uric acid normal on allopurinol  Will resolve this issue  History of gout  No recent gout flares  Uric acid at tx goal      Benign essential hypertension  Repeat blood pressure improved  Currently taking doxazosin 8 mg daily as well as Benicar 20 mg daily  Advised him to check his blood pressure at home to help evaluate control of blood pressure between office visits  Patient has been on doxyzocin for some time since his 35s or 45s  Discussed with time may need to consider transitioning away from this medicine if it starts to cause any lightheadedness or dizziness    Class 2 obesity due to excess calories without serious comorbidity with body mass index (BMI) of 36 0 to 36 9 in adult   Encouraged low-fat low-cholesterol diet can help improve cholesterol levels  Hypercholesterolemia   Patient currently taking red yeast rice and fish oils  Give encouragement with low-fat low-cholesterol diet  Cholesterol levels did improved since last visit  Reviewed ASCVD score and recommended statin which patient is not interested in at this time  Will continue to follow  Follow up with laboratory studies in 6 months       Diagnoses and all orders for this visit:    Need for hepatitis C screening test    Benign essential hypertension  -     Comprehensive metabolic panel; Future  -     CBC and differential; Future    Hypercholesterolemia  -     Lipid panel;  Future    Hyperuricemia    History of gout    Class 2 obesity due to excess calories without serious comorbidity with body mass index (BMI) of 36 0 to 36 9 in adult          Subjective:   Chief Complaint   Patient presents with    Follow-up Pt is here for his 4 month follow up for his HTN  Review labs from 9/26/18  Pt refused the flu shot today   Hypertension         Bryan Negron  is a 61 y o  male who presents to the office on 10/5/2018 for     Patient here today for regular scheduled follow-up  No complaints or concerns at this time  Taking medications as directed  Notes he has been on doxazosin since a young age  Had several gouty flare-ups in the last year and was started on allopurinol  Since being started on allopurinol and having his water pill discontinued his gout symptoms have resolved  No recent flare-ups  Feeling well at this time  Notes he had colonoscopy x2  He notes he is about due for followup  Taking red yeast rice and fish oils for his cholesterol      Hypertension   This is a chronic problem  The current episode started more than 1 year ago  The problem is controlled  Pertinent negatives include no anxiety, chest pain, palpitations, peripheral edema or shortness of breath  Risk factors for coronary artery disease include male gender, obesity and dyslipidemia  Past treatments include alpha 1 blockers and angiotensin blockers  The following portions of the patient's history were reviewed and updated as appropriate: allergies, current medications, past family history, past medical history, past social history, past surgical history and problem list     Review of Systems   Constitutional: Negative for chills and fever  Respiratory: Negative for cough, shortness of breath and wheezing  Cardiovascular: Negative for chest pain and palpitations  Gastrointestinal: Negative for abdominal pain, diarrhea, nausea and vomiting  Genitourinary: Negative for difficulty urinating, dysuria, frequency and hematuria  Skin: Negative for rash  Neurological: Negative for dizziness, syncope, weakness and light-headedness           Patient Active Problem List   Diagnosis    Benign essential hypertension    Hypercholesterolemia    History of gout    Class 2 obesity due to excess calories without serious comorbidity with body mass index (BMI) of 36 0 to 36 9 in adult       Past Medical History:   Diagnosis Date    Blurred vision     Last assessed - 1/28/15    CAP (community acquired pneumonia)     Last assessed - 9/20/16    Hemorrhoids     Last assessed - 11/23/15    HTN (hypertension)     Hypercholesterolemia     IFG (impaired fasting glucose)     Last assessed - 11/18/14    Rotator cuff tendinitis     Last assessed - 6/17/15       Past Surgical History:   Procedure Laterality Date    APPENDECTOMY      Resolved - 1/24/09    COLONOSCOPY      WISDOM TOOTH EXTRACTION           Current Outpatient Prescriptions:     allopurinol (ZYLOPRIM) 300 mg tablet, Take 1 tablet (300 mg total) by mouth daily, Disp: 90 tablet, Rfl: 1    aspirin 81 MG tablet, Take 1 tablet by mouth daily, Disp: , Rfl:     doxazosin (CARDURA) 8 MG tablet, Take 1 tablet (8 mg total) by mouth daily, Disp: 90 tablet, Rfl: 0    olmesartan (BENICAR) 20 mg tablet, Take 1 tablet (20 mg total) by mouth daily, Disp: 90 tablet, Rfl: 1    Omega-3 1000 MG CAPS, Take 1 capsule by mouth daily  , Disp: , Rfl:     Red Yeast Rice 600 MG CAPS, Take 1 capsule by mouth daily  , Disp: , Rfl:     Allergies   Allergen Reactions    Penicillins Hives    Pollen Extract        Social History     Social History    Marital status: /Civil Union     Spouse name: N/A    Number of children: N/A    Years of education: N/A     Occupational History    [employer]     Social History Main Topics    Smoking status: Never Smoker    Smokeless tobacco: Never Used    Alcohol use Yes      Comment: Socially    Drug use: No    Sexual activity: Not Asked     Other Topics Concern    None     Social History Narrative    Travel Hx - Pt denies being out of the country during 1/2014-11/18/2014           Family History   Problem Relation Age of Onset    Hyperlipidemia Mother     Hypertension Mother     Pulmonary embolism Mother     Diabetes type II Mother     Hyperlipidemia Father     Hypertension Father     Prostate cancer Father     Diabetes type II Father        PHQ-9 Depression Screening    PHQ-9:    Frequency of the following problems over the past two weeks:              Health Maintenance   Topic Date Due    DTaP,Tdap,and Td Vaccines (1 - Tdap) 09/02/2007    INFLUENZA VACCINE  09/01/2018    Depression Screening PHQ  11/29/2018    CRC Screening: Colonoscopy  11/20/2019       Immunization History   Administered Date(s) Administered    Influenza Quadrivalent Preservative Free 3 years and older IM 11/29/2017    Influenza Quadrivalent, 6-35 Months IM 11/21/2016    Td (adult), adsorbed 09/01/2007        Objective:  Vitals:    10/05/18 1404 10/05/18 1407 10/05/18 1429   BP: 158/76 158/70 130/62   BP Location: Left arm Right arm    Patient Position: Sitting Sitting    Cuff Size: Large Large    Pulse: 96     Temp: 98 2 °F (36 8 °C)     TempSrc: Oral     SpO2: 98%     Weight: 119 kg (263 lb)     Height: 5' 10" (1 778 m)       Wt Readings from Last 3 Encounters:   10/05/18 119 kg (263 lb)   03/28/18 120 kg (264 lb)   11/29/17 117 kg (257 lb 2 oz)     Body mass index is 37 74 kg/m²  No exam data present       Physical Exam   Constitutional: He is oriented to person, place, and time  He appears well-developed and well-nourished  No distress  HENT:   Head: Normocephalic and atraumatic  Mouth/Throat: No oropharyngeal exudate  Eyes: Pupils are equal, round, and reactive to light  Right eye exhibits no discharge  Left eye exhibits no discharge  No scleral icterus  Neck: Neck supple  Cardiovascular: Normal rate, regular rhythm and normal heart sounds  No murmur heard  Pulmonary/Chest: Effort normal and breath sounds normal  No respiratory distress  He has no wheezes  He has no rales  Clear to auscultation throughout    No crackles no rhonchi no wheeze  No respiratory distress   Abdominal: Soft  Bowel sounds are normal  There is no tenderness  There is no guarding  Soft nontender nondistended positive bowel sounds   Musculoskeletal: He exhibits no edema  Neurological: He is alert and oriented to person, place, and time  Skin: Skin is warm and dry  No rash noted  He is not diaphoretic  Psychiatric: He has a normal mood and affect  His behavior is normal  Thought content normal    Nursing note and vitals reviewed            Future Appointments  Date Time Provider Phyllis Brooks   4/5/2019 2:30 PM Rasheed Murphy PA-C [address] [place] [address]    Patient Care Team:  Lesa Weston MD as PCP - General

## 2018-10-05 NOTE — ASSESSMENT & PLAN NOTE
Repeat blood pressure improved  Currently taking doxazosin 8 mg daily as well as Benicar 20 mg daily  Advised him to check his blood pressure at home to help evaluate control of blood pressure between office visits  Patient has been on doxyzocin for some time since his 35s or 45s    Discussed with time may need to consider transitioning away from this medicine if it starts to cause any lightheadedness or dizziness

## 2018-10-17 DIAGNOSIS — I10 BENIGN ESSENTIAL HYPERTENSION: ICD-10-CM

## 2018-10-24 RX ORDER — DOXAZOSIN 8 MG/1
8 TABLET ORAL DAILY
Qty: 90 TABLET | Refills: 1 | OUTPATIENT
Start: 2018-10-24

## 2018-11-26 DIAGNOSIS — Z87.39 HISTORY OF GOUT: ICD-10-CM

## 2018-11-26 DIAGNOSIS — E79.0 HYPERURICEMIA: ICD-10-CM

## 2018-11-30 RX ORDER — ALLOPURINOL 300 MG/1
TABLET ORAL
Qty: 90 TABLET | Refills: 1 | OUTPATIENT
Start: 2018-11-30

## 2018-12-03 DIAGNOSIS — E79.0 HYPERURICEMIA: ICD-10-CM

## 2018-12-03 DIAGNOSIS — Z87.39 HISTORY OF GOUT: ICD-10-CM

## 2018-12-03 RX ORDER — ALLOPURINOL 300 MG/1
300 TABLET ORAL DAILY
Qty: 90 TABLET | Refills: 0 | Status: SHIPPED | OUTPATIENT
Start: 2018-12-03 | End: 2018-12-03 | Stop reason: SDUPTHER

## 2018-12-03 RX ORDER — ALLOPURINOL 300 MG/1
300 TABLET ORAL DAILY
Qty: 90 TABLET | Refills: 1 | Status: SHIPPED | OUTPATIENT
Start: 2018-12-03 | End: 2019-06-26 | Stop reason: SDUPTHER

## 2018-12-04 DIAGNOSIS — I10 BENIGN ESSENTIAL HYPERTENSION: ICD-10-CM

## 2018-12-05 DIAGNOSIS — I10 BENIGN ESSENTIAL HYPERTENSION: ICD-10-CM

## 2018-12-05 RX ORDER — OLMESARTAN MEDOXOMIL 20 MG/1
20 TABLET ORAL DAILY
Qty: 90 TABLET | Refills: 1 | Status: SHIPPED | OUTPATIENT
Start: 2018-12-05 | End: 2019-04-05 | Stop reason: ALTCHOICE

## 2018-12-05 RX ORDER — OLMESARTAN MEDOXOMIL 20 MG/1
20 TABLET ORAL DAILY
Qty: 90 TABLET | Refills: 1 | OUTPATIENT
Start: 2018-12-05

## 2018-12-05 NOTE — TELEPHONE ENCOUNTER
Pt wife call because he needs a refill for his olmesartan (BENICAR) 20 mg tablet sent to [facility] Thanks

## 2019-02-13 DIAGNOSIS — I10 BENIGN ESSENTIAL HYPERTENSION: ICD-10-CM

## 2019-02-13 RX ORDER — DOXAZOSIN 8 MG/1
8 TABLET ORAL DAILY
Qty: 90 TABLET | Refills: 1 | Status: SHIPPED | OUTPATIENT
Start: 2019-02-13 | End: 2019-09-17 | Stop reason: SDUPTHER

## 2019-03-15 ENCOUNTER — APPOINTMENT (OUTPATIENT)
Dept: LAB | Age: 64
End: 2019-03-15
Payer: COMMERCIAL

## 2019-03-15 DIAGNOSIS — I10 BENIGN ESSENTIAL HYPERTENSION: ICD-10-CM

## 2019-03-15 DIAGNOSIS — E78.00 HYPERCHOLESTEROLEMIA: ICD-10-CM

## 2019-03-15 LAB
ALBUMIN SERPL BCP-MCNC: 3.7 G/DL (ref 3.5–5)
ALP SERPL-CCNC: 122 U/L (ref 46–116)
ALT SERPL W P-5'-P-CCNC: 35 U/L (ref 12–78)
ANION GAP SERPL CALCULATED.3IONS-SCNC: 2 MMOL/L (ref 4–13)
AST SERPL W P-5'-P-CCNC: 22 U/L (ref 5–45)
BASOPHILS # BLD AUTO: 0.08 THOUSANDS/ΜL (ref 0–0.1)
BASOPHILS NFR BLD AUTO: 1 % (ref 0–1)
BILIRUB SERPL-MCNC: 0.77 MG/DL (ref 0.2–1)
BUN SERPL-MCNC: 15 MG/DL (ref 5–25)
CALCIUM SERPL-MCNC: 9.1 MG/DL (ref 8.3–10.1)
CHLORIDE SERPL-SCNC: 111 MMOL/L (ref 100–108)
CHOLEST SERPL-MCNC: 162 MG/DL (ref 50–200)
CO2 SERPL-SCNC: 26 MMOL/L (ref 21–32)
CREAT SERPL-MCNC: 1.13 MG/DL (ref 0.6–1.3)
EOSINOPHIL # BLD AUTO: 0.56 THOUSAND/ΜL (ref 0–0.61)
EOSINOPHIL NFR BLD AUTO: 10 % (ref 0–6)
ERYTHROCYTE [DISTWIDTH] IN BLOOD BY AUTOMATED COUNT: 13.2 % (ref 11.6–15.1)
GFR SERPL CREATININE-BSD FRML MDRD: 69 ML/MIN/1.73SQ M
GLUCOSE P FAST SERPL-MCNC: 103 MG/DL (ref 65–99)
HCT VFR BLD AUTO: 44.5 % (ref 36.5–49.3)
HDLC SERPL-MCNC: 41 MG/DL (ref 40–60)
HGB BLD-MCNC: 14.8 G/DL (ref 12–17)
IMM GRANULOCYTES # BLD AUTO: 0.02 THOUSAND/UL (ref 0–0.2)
IMM GRANULOCYTES NFR BLD AUTO: 0 % (ref 0–2)
LDLC SERPL CALC-MCNC: 102 MG/DL (ref 0–100)
LYMPHOCYTES # BLD AUTO: 1.94 THOUSANDS/ΜL (ref 0.6–4.47)
LYMPHOCYTES NFR BLD AUTO: 33 % (ref 14–44)
MCH RBC QN AUTO: 31.4 PG (ref 26.8–34.3)
MCHC RBC AUTO-ENTMCNC: 33.3 G/DL (ref 31.4–37.4)
MCV RBC AUTO: 95 FL (ref 82–98)
MONOCYTES # BLD AUTO: 0.48 THOUSAND/ΜL (ref 0.17–1.22)
MONOCYTES NFR BLD AUTO: 8 % (ref 4–12)
NEUTROPHILS # BLD AUTO: 2.8 THOUSANDS/ΜL (ref 1.85–7.62)
NEUTS SEG NFR BLD AUTO: 48 % (ref 43–75)
NONHDLC SERPL-MCNC: 121 MG/DL
NRBC BLD AUTO-RTO: 0 /100 WBCS
PLATELET # BLD AUTO: 183 THOUSANDS/UL (ref 149–390)
PMV BLD AUTO: 11.6 FL (ref 8.9–12.7)
POTASSIUM SERPL-SCNC: 4.5 MMOL/L (ref 3.5–5.3)
PROT SERPL-MCNC: 7.2 G/DL (ref 6.4–8.2)
RBC # BLD AUTO: 4.71 MILLION/UL (ref 3.88–5.62)
SODIUM SERPL-SCNC: 139 MMOL/L (ref 136–145)
TRIGL SERPL-MCNC: 93 MG/DL
WBC # BLD AUTO: 5.88 THOUSAND/UL (ref 4.31–10.16)

## 2019-03-15 PROCEDURE — 80061 LIPID PANEL: CPT

## 2019-03-15 PROCEDURE — 85025 COMPLETE CBC W/AUTO DIFF WBC: CPT

## 2019-03-15 PROCEDURE — 36415 COLL VENOUS BLD VENIPUNCTURE: CPT

## 2019-03-15 PROCEDURE — 80053 COMPREHEN METABOLIC PANEL: CPT

## 2019-04-05 ENCOUNTER — OFFICE VISIT (OUTPATIENT)
Dept: INTERNAL MEDICINE CLINIC | Facility: CLINIC | Age: 64
End: 2019-04-05
Payer: COMMERCIAL

## 2019-04-05 VITALS
BODY MASS INDEX: 36.36 KG/M2 | DIASTOLIC BLOOD PRESSURE: 80 MMHG | HEART RATE: 71 BPM | WEIGHT: 254 LBS | SYSTOLIC BLOOD PRESSURE: 122 MMHG | OXYGEN SATURATION: 96 % | TEMPERATURE: 98 F | HEIGHT: 70 IN

## 2019-04-05 DIAGNOSIS — I10 BENIGN ESSENTIAL HYPERTENSION: Primary | ICD-10-CM

## 2019-04-05 DIAGNOSIS — Z87.39 HISTORY OF GOUT: ICD-10-CM

## 2019-04-05 DIAGNOSIS — R74.8 ALKALINE PHOSPHATASE ELEVATION: ICD-10-CM

## 2019-04-05 DIAGNOSIS — E78.00 HYPERCHOLESTEROLEMIA: ICD-10-CM

## 2019-04-05 DIAGNOSIS — E66.09 CLASS 2 OBESITY DUE TO EXCESS CALORIES WITHOUT SERIOUS COMORBIDITY WITH BODY MASS INDEX (BMI) OF 36.0 TO 36.9 IN ADULT: ICD-10-CM

## 2019-04-05 DIAGNOSIS — R73.01 IMPAIRED FASTING GLUCOSE: ICD-10-CM

## 2019-04-05 DIAGNOSIS — Z80.42 FAMILY HISTORY OF MALIGNANT NEOPLASM OF PROSTATE: ICD-10-CM

## 2019-04-05 PROCEDURE — 3008F BODY MASS INDEX DOCD: CPT | Performed by: PHYSICIAN ASSISTANT

## 2019-04-05 PROCEDURE — 1036F TOBACCO NON-USER: CPT | Performed by: PHYSICIAN ASSISTANT

## 2019-04-05 PROCEDURE — 3074F SYST BP LT 130 MM HG: CPT | Performed by: PHYSICIAN ASSISTANT

## 2019-04-05 PROCEDURE — 99214 OFFICE O/P EST MOD 30 MIN: CPT | Performed by: PHYSICIAN ASSISTANT

## 2019-04-05 PROCEDURE — 3079F DIAST BP 80-89 MM HG: CPT | Performed by: PHYSICIAN ASSISTANT

## 2019-04-05 RX ORDER — LISINOPRIL 20 MG/1
20 TABLET ORAL DAILY
Qty: 30 TABLET | Refills: 1 | Status: SHIPPED | OUTPATIENT
Start: 2019-04-05 | End: 2019-05-17 | Stop reason: SDUPTHER

## 2019-05-11 ENCOUNTER — APPOINTMENT (OUTPATIENT)
Dept: LAB | Age: 64
End: 2019-05-11
Payer: COMMERCIAL

## 2019-05-11 DIAGNOSIS — E78.00 HYPERCHOLESTEROLEMIA: ICD-10-CM

## 2019-05-11 DIAGNOSIS — Z80.42 FAMILY HISTORY OF MALIGNANT NEOPLASM OF PROSTATE: ICD-10-CM

## 2019-05-11 DIAGNOSIS — R73.01 IMPAIRED FASTING GLUCOSE: ICD-10-CM

## 2019-05-11 DIAGNOSIS — I10 BENIGN ESSENTIAL HYPERTENSION: ICD-10-CM

## 2019-05-11 LAB
ALBUMIN SERPL BCP-MCNC: 3.8 G/DL (ref 3.5–5)
ALP SERPL-CCNC: 110 U/L (ref 46–116)
ALT SERPL W P-5'-P-CCNC: 29 U/L (ref 12–78)
ANION GAP SERPL CALCULATED.3IONS-SCNC: 3 MMOL/L (ref 4–13)
AST SERPL W P-5'-P-CCNC: 14 U/L (ref 5–45)
BILIRUB SERPL-MCNC: 0.96 MG/DL (ref 0.2–1)
BILIRUB UR QL STRIP: NEGATIVE
BUN SERPL-MCNC: 19 MG/DL (ref 5–25)
CALCIUM SERPL-MCNC: 8.6 MG/DL (ref 8.3–10.1)
CHLORIDE SERPL-SCNC: 111 MMOL/L (ref 100–108)
CLARITY UR: CLEAR
CO2 SERPL-SCNC: 28 MMOL/L (ref 21–32)
COLOR UR: YELLOW
CREAT SERPL-MCNC: 1.13 MG/DL (ref 0.6–1.3)
EST. AVERAGE GLUCOSE BLD GHB EST-MCNC: 105 MG/DL
GFR SERPL CREATININE-BSD FRML MDRD: 69 ML/MIN/1.73SQ M
GLUCOSE P FAST SERPL-MCNC: 103 MG/DL (ref 65–99)
GLUCOSE UR STRIP-MCNC: NEGATIVE MG/DL
HBA1C MFR BLD: 5.3 % (ref 4.2–6.3)
HGB UR QL STRIP.AUTO: NEGATIVE
KETONES UR STRIP-MCNC: NEGATIVE MG/DL
LEUKOCYTE ESTERASE UR QL STRIP: NEGATIVE
NITRITE UR QL STRIP: NEGATIVE
PH UR STRIP.AUTO: 6 [PH]
POTASSIUM SERPL-SCNC: 4.8 MMOL/L (ref 3.5–5.3)
PROT SERPL-MCNC: 7.1 G/DL (ref 6.4–8.2)
PROT UR STRIP-MCNC: NEGATIVE MG/DL
PSA SERPL-MCNC: 0.3 NG/ML (ref 0–4)
SODIUM SERPL-SCNC: 142 MMOL/L (ref 136–145)
SP GR UR STRIP.AUTO: 1.02 (ref 1–1.03)
UROBILINOGEN UR QL STRIP.AUTO: 1 E.U./DL

## 2019-05-11 PROCEDURE — 84153 ASSAY OF PSA TOTAL: CPT

## 2019-05-11 PROCEDURE — 81003 URINALYSIS AUTO W/O SCOPE: CPT | Performed by: PHYSICIAN ASSISTANT

## 2019-05-11 PROCEDURE — 80053 COMPREHEN METABOLIC PANEL: CPT

## 2019-05-11 PROCEDURE — 83036 HEMOGLOBIN GLYCOSYLATED A1C: CPT

## 2019-05-11 PROCEDURE — 36415 COLL VENOUS BLD VENIPUNCTURE: CPT

## 2019-05-17 ENCOUNTER — OFFICE VISIT (OUTPATIENT)
Dept: INTERNAL MEDICINE CLINIC | Facility: CLINIC | Age: 64
End: 2019-05-17
Payer: COMMERCIAL

## 2019-05-17 VITALS
WEIGHT: 246.6 LBS | SYSTOLIC BLOOD PRESSURE: 132 MMHG | OXYGEN SATURATION: 98 % | BODY MASS INDEX: 34.52 KG/M2 | TEMPERATURE: 97.9 F | DIASTOLIC BLOOD PRESSURE: 76 MMHG | HEIGHT: 71 IN | HEART RATE: 72 BPM

## 2019-05-17 DIAGNOSIS — E78.00 HYPERCHOLESTEROLEMIA: ICD-10-CM

## 2019-05-17 DIAGNOSIS — Z23 NEED FOR DIPHTHERIA-TETANUS-PERTUSSIS (TDAP) VACCINE: Primary | ICD-10-CM

## 2019-05-17 DIAGNOSIS — I10 BENIGN ESSENTIAL HYPERTENSION: ICD-10-CM

## 2019-05-17 DIAGNOSIS — R73.01 IMPAIRED FASTING GLUCOSE: ICD-10-CM

## 2019-05-17 DIAGNOSIS — R74.8 ALKALINE PHOSPHATASE ELEVATION: ICD-10-CM

## 2019-05-17 PROCEDURE — 3078F DIAST BP <80 MM HG: CPT | Performed by: PHYSICIAN ASSISTANT

## 2019-05-17 PROCEDURE — 3008F BODY MASS INDEX DOCD: CPT | Performed by: PHYSICIAN ASSISTANT

## 2019-05-17 PROCEDURE — 99213 OFFICE O/P EST LOW 20 MIN: CPT | Performed by: PHYSICIAN ASSISTANT

## 2019-05-17 PROCEDURE — 3075F SYST BP GE 130 - 139MM HG: CPT | Performed by: PHYSICIAN ASSISTANT

## 2019-05-17 PROCEDURE — 1036F TOBACCO NON-USER: CPT | Performed by: PHYSICIAN ASSISTANT

## 2019-05-17 RX ORDER — LISINOPRIL 30 MG/1
30 TABLET ORAL DAILY
Qty: 90 TABLET | Refills: 1 | Status: SHIPPED | OUTPATIENT
Start: 2019-05-17 | End: 2019-11-20 | Stop reason: SDUPTHER

## 2019-06-07 DIAGNOSIS — Z87.39 HISTORY OF GOUT: ICD-10-CM

## 2019-06-07 DIAGNOSIS — E79.0 HYPERURICEMIA: ICD-10-CM

## 2019-06-07 RX ORDER — ALLOPURINOL 300 MG/1
TABLET ORAL
Qty: 90 TABLET | Refills: 1 | OUTPATIENT
Start: 2019-06-07

## 2019-06-26 DIAGNOSIS — E79.0 HYPERURICEMIA: ICD-10-CM

## 2019-06-26 DIAGNOSIS — Z87.39 HISTORY OF GOUT: ICD-10-CM

## 2019-06-26 RX ORDER — ALLOPURINOL 300 MG/1
300 TABLET ORAL DAILY
Qty: 90 TABLET | Refills: 1 | Status: SHIPPED | OUTPATIENT
Start: 2019-06-26 | End: 2020-01-31 | Stop reason: ALTCHOICE

## 2019-08-18 DIAGNOSIS — I10 BENIGN ESSENTIAL HYPERTENSION: ICD-10-CM

## 2019-08-18 RX ORDER — DOXAZOSIN 8 MG/1
TABLET ORAL
Qty: 90 TABLET | Refills: 1 | OUTPATIENT
Start: 2019-08-18

## 2019-09-11 DIAGNOSIS — I10 BENIGN ESSENTIAL HYPERTENSION: ICD-10-CM

## 2019-09-11 RX ORDER — DOXAZOSIN 8 MG/1
TABLET ORAL
Qty: 90 TABLET | Refills: 1 | OUTPATIENT
Start: 2019-09-11

## 2019-09-13 DIAGNOSIS — I10 BENIGN ESSENTIAL HYPERTENSION: ICD-10-CM

## 2019-09-15 RX ORDER — DOXAZOSIN 8 MG/1
TABLET ORAL
Qty: 90 TABLET | Refills: 1 | OUTPATIENT
Start: 2019-09-15

## 2019-09-17 DIAGNOSIS — I10 BENIGN ESSENTIAL HYPERTENSION: ICD-10-CM

## 2019-09-17 RX ORDER — DOXAZOSIN 8 MG/1
8 TABLET ORAL DAILY
Qty: 90 TABLET | Refills: 1 | Status: SHIPPED | OUTPATIENT
Start: 2019-09-17 | End: 2019-11-29 | Stop reason: SDUPTHER

## 2019-11-13 ENCOUNTER — LAB (OUTPATIENT)
Dept: LAB | Age: 64
End: 2019-11-13
Payer: COMMERCIAL

## 2019-11-13 DIAGNOSIS — E78.00 HYPERCHOLESTEROLEMIA: ICD-10-CM

## 2019-11-13 DIAGNOSIS — I10 BENIGN ESSENTIAL HYPERTENSION: ICD-10-CM

## 2019-11-13 DIAGNOSIS — R74.8 ALKALINE PHOSPHATASE ELEVATION: ICD-10-CM

## 2019-11-13 LAB
ALBUMIN SERPL BCP-MCNC: 4 G/DL (ref 3.5–5)
ALP SERPL-CCNC: 105 U/L (ref 46–116)
ALT SERPL W P-5'-P-CCNC: 27 U/L (ref 12–78)
ANION GAP SERPL CALCULATED.3IONS-SCNC: 5 MMOL/L (ref 4–13)
AST SERPL W P-5'-P-CCNC: 17 U/L (ref 5–45)
BILIRUB SERPL-MCNC: 0.76 MG/DL (ref 0.2–1)
BUN SERPL-MCNC: 14 MG/DL (ref 5–25)
CALCIUM SERPL-MCNC: 8.9 MG/DL (ref 8.3–10.1)
CHLORIDE SERPL-SCNC: 112 MMOL/L (ref 100–108)
CHOLEST SERPL-MCNC: 156 MG/DL (ref 50–200)
CO2 SERPL-SCNC: 28 MMOL/L (ref 21–32)
CREAT SERPL-MCNC: 1.08 MG/DL (ref 0.6–1.3)
GFR SERPL CREATININE-BSD FRML MDRD: 72 ML/MIN/1.73SQ M
GLUCOSE P FAST SERPL-MCNC: 90 MG/DL (ref 65–99)
HDLC SERPL-MCNC: 37 MG/DL
LDLC SERPL CALC-MCNC: 96 MG/DL (ref 0–100)
POTASSIUM SERPL-SCNC: 4.8 MMOL/L (ref 3.5–5.3)
PROT SERPL-MCNC: 7.2 G/DL (ref 6.4–8.2)
SODIUM SERPL-SCNC: 145 MMOL/L (ref 136–145)
TRIGL SERPL-MCNC: 116 MG/DL

## 2019-11-13 PROCEDURE — 80061 LIPID PANEL: CPT

## 2019-11-13 PROCEDURE — 36415 COLL VENOUS BLD VENIPUNCTURE: CPT

## 2019-11-13 PROCEDURE — 80053 COMPREHEN METABOLIC PANEL: CPT

## 2019-11-20 DIAGNOSIS — I10 BENIGN ESSENTIAL HYPERTENSION: ICD-10-CM

## 2019-11-20 RX ORDER — LISINOPRIL 30 MG/1
30 TABLET ORAL DAILY
Qty: 90 TABLET | Refills: 1 | Status: SHIPPED | OUTPATIENT
Start: 2019-11-20 | End: 2019-11-29 | Stop reason: SDUPTHER

## 2019-11-29 ENCOUNTER — OFFICE VISIT (OUTPATIENT)
Dept: INTERNAL MEDICINE CLINIC | Facility: CLINIC | Age: 64
End: 2019-11-29
Payer: COMMERCIAL

## 2019-11-29 VITALS
TEMPERATURE: 98.5 F | DIASTOLIC BLOOD PRESSURE: 86 MMHG | BODY MASS INDEX: 35.33 KG/M2 | WEIGHT: 246.8 LBS | HEART RATE: 97 BPM | HEIGHT: 70 IN | SYSTOLIC BLOOD PRESSURE: 134 MMHG | OXYGEN SATURATION: 98 %

## 2019-11-29 DIAGNOSIS — Z87.39 HISTORY OF GOUT: ICD-10-CM

## 2019-11-29 DIAGNOSIS — R73.01 IMPAIRED FASTING GLUCOSE: ICD-10-CM

## 2019-11-29 DIAGNOSIS — I10 BENIGN ESSENTIAL HYPERTENSION: ICD-10-CM

## 2019-11-29 DIAGNOSIS — E78.00 HYPERCHOLESTEROLEMIA: ICD-10-CM

## 2019-11-29 DIAGNOSIS — Z12.11 SCREENING FOR COLON CANCER: Primary | ICD-10-CM

## 2019-11-29 DIAGNOSIS — E79.0 HYPERURICEMIA: ICD-10-CM

## 2019-11-29 PROCEDURE — 99214 OFFICE O/P EST MOD 30 MIN: CPT | Performed by: PHYSICIAN ASSISTANT

## 2019-11-29 PROCEDURE — 3075F SYST BP GE 130 - 139MM HG: CPT | Performed by: PHYSICIAN ASSISTANT

## 2019-11-29 PROCEDURE — 3079F DIAST BP 80-89 MM HG: CPT | Performed by: PHYSICIAN ASSISTANT

## 2019-11-29 PROCEDURE — 3008F BODY MASS INDEX DOCD: CPT | Performed by: PHYSICIAN ASSISTANT

## 2019-11-29 RX ORDER — ALLOPURINOL 300 MG/1
300 TABLET ORAL DAILY
Qty: 90 TABLET | Refills: 1 | Status: CANCELLED | OUTPATIENT
Start: 2019-11-29

## 2019-11-29 RX ORDER — LISINOPRIL 30 MG/1
30 TABLET ORAL DAILY
Qty: 90 TABLET | Refills: 1 | Status: SHIPPED | OUTPATIENT
Start: 2019-11-29 | End: 2020-07-31 | Stop reason: SDUPTHER

## 2019-11-29 RX ORDER — DOXAZOSIN 8 MG/1
8 TABLET ORAL DAILY
Qty: 90 TABLET | Refills: 1 | Status: SHIPPED | OUTPATIENT
Start: 2019-11-29 | End: 2020-04-23 | Stop reason: SDUPTHER

## 2019-11-29 NOTE — PROGRESS NOTES
[facility] [place]  Standard Office Visit  Patient ID: Michi Mc  : 1955  Age/Gender: 59 y o  male     DATE: 2019      Assessment/Plan:  BMI Counseling: Body mass index is 35 41 kg/m²  The BMI is above normal  Nutrition recommendations include reducing portion sizes, 3-5 servings of fruits/vegetables daily and consuming healthier snacks  Hypercholesterolemia  The 10-year ASCVD risk score (Tenzin Roberts et al , 2013) is: 16 3%    Values used to calculate the score:      Age: 59 years      Sex: Male      Is Non- : No      Diabetic: No      Tobacco smoker: No      Systolic Blood Pressure: 849 mmHg      Is BP treated: Yes      HDL Cholesterol: 37 mg/dL      Total Cholesterol: 156 mg/dL      Patient currently using fish oil and red yeast rice  Recommended statin therapy which patient is not interested in at this time  Discussed the importance of weight loss daily activity low-fat low-cholesterol diet to help improve cholesterol levels  Discussed risks of poorly controlled cholesterol with patient  Discussed that cholesterol is part diet and part generic and some people tend to make more cholesterol than others and may not only be related to his diet  Will discuss further at follow-up    History of gout  Patient has had no gout flare-up in some time  Previous flare up with secondary to starting blood pressure medicines likely hydrochlorothiazide  He has been off of this for some time  Patient would like to try and taper off of allopurinol because prior to HCTZ he had no prior gout history  Advised him to taper down his allopurinol dose  Start taking 1 tablet by mouth every other day until as allopurinol is gone then discontinue fully  Will repeat a uric acid prior to his next follow-up    Benign essential hypertension  Blood pressure slightly elevated today    Currently taking lisinopril 30 mg daily and Cardura 8 mg daily  Discussed adjustment to patient's lisinopril dose which she is not interested in at this time  Without any current symptoms  Advised him to continue to check his blood pressure at home  Discussed blood pressure goals of less than 130 over less than 80  Keep blood pressure log to bring to our follow-up  Weight loss and daily activity can also improved blood pressure readings  Follow-up in 2 months to discuss labs and blood pressure log    Impaired fasting glucose  Will check A1c with next lab set  Encouraged patient weight loss and daily exercise to help improve fasting blood sugar level  BMI 34 0-34 9,adult  Previously patient had made significant improvement in his weight  Give encouragement as it appears that his weight has plateaued  Discussed daily exercise and dietary changes to help       Diagnoses and all orders for this visit:    Screening for colon cancer    Hyperuricemia    History of gout  -     Uric acid; Future    Benign essential hypertension  -     lisinopril (ZESTRIL) 30 mg tablet; Take 1 tablet (30 mg total) by mouth daily  -     doxazosin (CARDURA) 8 MG tablet; Take 1 tablet (8 mg total) by mouth daily  -     HEMOGLOBIN A1C W/ EAG ESTIMATION; Future  -     Comprehensive metabolic panel; Future    Impaired fasting glucose  -     HEMOGLOBIN A1C W/ EAG ESTIMATION; Future  -     Comprehensive metabolic panel; Future    BMI 34 0-34 9,adult    Hypercholesterolemia  -     HEMOGLOBIN A1C W/ EAG ESTIMATION; Future  -     Comprehensive metabolic panel; Future    Other orders  -     Cancel: Ambulatory referral to Gastroenterology  -     Cancel: allopurinol (ZYLOPRIM) 300 mg tablet; Take 1 tablet (300 mg total) by mouth daily          Subjective:   Chief Complaint   Patient presents with    Follow-up     Chronic conditions- paper scripts pended  No new health issues to discuss           Prem Merritt  is a 59 y o  male who presents to the office on 11/29/2019 for     Patient here for follow-up for blood pressure as well as to review laboratory studies  Taking medications as directed  Taking Cardura 8 mg daily at night  He denies any urinary symptoms  He does wake at night to void approximately once per night  No difficulty voiding  No straining to void  No incontinence  No pain with urination  Taking lisinopril 30 mg daily which was increased from 20 mg at last visit  No complaints or concerns at this time  He notes that he recently received a information regarding repeat colonoscopy  He lacks to have colonoscopy repeated after the holidays  No GI sx at this time  Does not check BP at home  NO current daily exercise  Notes he is on his feet at work  Walks a great deal at work walking  No cardiac sx with exertion or with walking  Hypertension   This is a chronic problem  The current episode started more than 1 year ago  The problem is controlled  Pertinent negatives include no anxiety, blurred vision, chest pain, headaches, malaise/fatigue, orthopnea, palpitations, peripheral edema or shortness of breath  Risk factors for coronary artery disease include male gender and obesity  Past treatments include ACE inhibitors and alpha 1 blockers  Hyperlipidemia   This is a chronic problem  The current episode started more than 1 year ago  The problem is uncontrolled  Recent lipid tests were reviewed and are high  Exacerbating diseases include obesity  Pertinent negatives include no chest pain or shortness of breath  Treatments tried: fish oil, red yeast rice  The current treatment provides mild improvement of lipids  Compliance problems include adherence to diet  Risk factors for coronary artery disease include hypertension, male sex, obesity and dyslipidemia         The following portions of the patient's history were reviewed and updated as appropriate: allergies, current medications, past family history, past medical history, past social history, past surgical history and problem list     Review of Systems   Constitutional: Negative for chills, fever, malaise/fatigue and unexpected weight change  Eyes: Negative for blurred vision  Respiratory: Negative for cough, shortness of breath and wheezing  Cardiovascular: Negative for chest pain, palpitations and orthopnea  Gastrointestinal: Negative for abdominal pain, diarrhea, nausea and vomiting  Notes he was recently notified that he is due for follow-up screening colonoscopy  Plans to get this done after the holidays   Genitourinary: Negative for dysuria, frequency and hematuria  Musculoskeletal: Negative for arthralgias  Taking red yeast rice and fish oil   Neurological: Negative for dizziness, light-headedness and headaches           Patient Active Problem List   Diagnosis    Benign essential hypertension    Hypercholesterolemia    History of gout    BMI 34 0-34 9,adult    Impaired fasting glucose    Family history of malignant neoplasm of prostate       Past Medical History:   Diagnosis Date    Blurred vision     Last assessed - 1/28/15    CAP (community acquired pneumonia)     Last assessed - 9/20/16    Hemorrhoids     Last assessed - 11/23/15    HTN (hypertension)     Hypercholesterolemia     IFG (impaired fasting glucose)     Last assessed - 11/18/14    Rotator cuff tendinitis     Last assessed - 6/17/15       Past Surgical History:   Procedure Laterality Date    APPENDECTOMY      Resolved - 1/24/09    COLONOSCOPY      WISDOM TOOTH EXTRACTION           Current Outpatient Medications:     allopurinol (ZYLOPRIM) 300 mg tablet, Take 1 tablet (300 mg total) by mouth daily, Disp: 90 tablet, Rfl: 1    aspirin 81 MG tablet, Take 1 tablet by mouth daily, Disp: , Rfl:     doxazosin (CARDURA) 8 MG tablet, Take 1 tablet (8 mg total) by mouth daily, Disp: 90 tablet, Rfl: 1    lisinopril (ZESTRIL) 30 mg tablet, Take 1 tablet (30 mg total) by mouth daily, Disp: 90 tablet, Rfl: 1    Omega-3 1000 MG CAPS, Take 1 capsule by mouth daily  , Disp: , Rfl:     Red Yeast Rice 600 MG CAPS, Take 1 capsule by mouth daily  , Disp: , Rfl:     Allergies   Allergen Reactions    Penicillins Hives    Pollen Extract        Social History     Socioeconomic History    Marital status: /Civil Union     Spouse name: Not on file    Number of children: Not on file    Years of education: Not on file    Highest education level: Not on file   Occupational History    Occupation: [employer]     Employer: [employer]   Social Needs    Financial resource strain: Not on file    Food insecurity:     Worry: Not on file     Inability: Not on file    Transportation needs:     Medical: Not on file     Non-medical: Not on file   Tobacco Use    Smoking status: Never Smoker    Smokeless tobacco: Never Used   Substance and Sexual Activity    Alcohol use:  Yes     Alcohol/week: 1 0 standard drinks     Types: 1 Cans of beer per week     Frequency: 2-3 times a week     Drinks per session: 3 or 4     Binge frequency: Monthly     Comment: Socially    Drug use: No    Sexual activity: Not on file   Lifestyle    Physical activity:     Days per week: Not on file     Minutes per session: Not on file    Stress: Not on file   Relationships    Social connections:     Talks on phone: Not on file     Gets together: Not on file     Attends Buddhist service: Not on file     Active member of club or organization: Not on file     Attends meetings of clubs or organizations: Not on file     Relationship status: Not on file    Intimate partner violence:     Fear of current or ex partner: Not on file     Emotionally abused: Not on file     Physically abused: Not on file     Forced sexual activity: Not on file   Other Topics Concern    Not on file   Social History Narrative    Travel Hx - Pt denies being out of the country during 1/2014-11/18/2014       Family History   Problem Relation Age of Onset    Hyperlipidemia Mother     Hypertension Mother     Pulmonary embolism Mother     Diabetes type II Mother     Hyperlipidemia Father     Hypertension Father     Prostate cancer Father     Diabetes type II Father        PHQ-9 Depression Screening    PHQ-9:    Frequency of the following problems over the past two weeks:              Health Maintenance   Topic Date Due    DTaP,Tdap,and Td Vaccines (1 - Tdap) 08/06/1966    HIV Screening  08/06/1970    CRC Screening: Colonoscopy  03/03/2013    Influenza Vaccine  12/26/2019 (Originally 7/1/2019)    Depression Screening PHQ  04/05/2020    BMI: Followup Plan  04/05/2020    Pneumococcal Vaccine: 65+ Years (1 of 2 - PCV13) 08/06/2020    BMI: Adult  11/29/2020    Hepatitis C Screening  Completed    Pneumococcal Vaccine: Pediatrics (0 to 5 Years) and At-Risk Patients (6 to 59 Years)  Aged Out    HIB Vaccine  Aged Out    Hepatitis B Vaccine  Aged Out    IPV Vaccine  Aged Out    Hepatitis A Vaccine  Aged Out    Meningococcal ACWY Vaccine  Aged Out    HPV Vaccine  Aged [employer] History   Administered Date(s) Administered    Influenza Quadrivalent Preservative Free 3 years and older IM 11/29/2017    Influenza Quadrivalent, 6-35 Months IM 11/21/2016    Td (adult), adsorbed 09/01/2007        Objective:  Vitals:    11/29/19 1428 11/29/19 1501   BP: 142/70 134/86   BP Location: Right arm    Patient Position: Sitting    Cuff Size: Large    Pulse: 97    Temp: 98 5 °F (36 9 °C)    TempSrc: Oral    SpO2: 98%    Weight: 112 kg (246 lb 12 8 oz)    Height: 5' 10" (1 778 m)      Wt Readings from Last 3 Encounters:   11/29/19 112 kg (246 lb 12 8 oz)   05/17/19 112 kg (246 lb 9 6 oz)   04/05/19 115 kg (254 lb)     Body mass index is 35 41 kg/m²  No exam data present       Physical Exam   Constitutional: He appears well-developed and well-nourished  No distress  Alert pleasant cooperative  Seated in room in no acute distress   HENT:   Head: Normocephalic and atraumatic     Mouth/Throat: Oropharynx is clear and moist  No oropharyngeal exudate  Moist mucous membranes  Normal posterior pharynx   Eyes: Pupils are equal, round, and reactive to light  Right eye exhibits no discharge  Left eye exhibits no discharge  No scleral icterus  Neck: Neck supple  Cardiovascular: Normal rate, regular rhythm and normal heart sounds  No murmur heard  Regular rate and rhythm  No murmurs   Pulmonary/Chest: Effort normal and breath sounds normal  No respiratory distress  He has no wheezes  He has no rales  Clear to auscultation throughout  No crackles no rhonchi no wheeze  No respiratory distress   Abdominal: Soft  Bowel sounds are normal  He exhibits no distension  There is no tenderness  There is no guarding  Positive bowel sounds  Soft nontender nondistended   Neurological: He is alert  No gross focal neurologic deficits on exam   Skin: Skin is warm and dry  He is not diaphoretic  Psychiatric: He has a normal mood and affect  His behavior is normal  Thought content normal    Nursing note and vitals reviewed  Future Appointments   Date Time Provider Phyllis Brooks   1/31/2020  2:30 PM Yahir Sykes PA-C [address] [place] [address]    Patient Care Team:  Danielle Dai MD as PCP - General    This note was completed in part utilizing M-Modal Fluency Direct Software  Grammatical errors, random word insertions, spelling mistakes, and incomplete sentences can be an occasional consequence of this system secondary to software limitations, ambient noise, and hardware issues  If you have any questions or concerns about the content, text, or information contained within the body of this dictation, please contact the provider for clarification

## 2019-11-29 NOTE — ASSESSMENT & PLAN NOTE
Will check A1c with next lab set  Encouraged patient weight loss and daily exercise to help improve fasting blood sugar level

## 2019-11-29 NOTE — ASSESSMENT & PLAN NOTE
Patient has had no gout flare-up in some time  Previous flare up with secondary to starting blood pressure medicines likely hydrochlorothiazide  He has been off of this for some time  Patient would like to try and taper off of allopurinol because prior to HCTZ he had no prior gout history  Advised him to taper down his allopurinol dose  Start taking 1 tablet by mouth every other day until as allopurinol is gone then discontinue fully    Will repeat a uric acid prior to his next follow-up

## 2019-11-29 NOTE — ASSESSMENT & PLAN NOTE
The 10-year ASCVD risk score (Jason Cole et al , 2013) is: 16 3%    Values used to calculate the score:      Age: 59 years      Sex: Male      Is Non- : No      Diabetic: No      Tobacco smoker: No      Systolic Blood Pressure: 132 mmHg      Is BP treated: Yes      HDL Cholesterol: 37 mg/dL      Total Cholesterol: 156 mg/dL      Patient currently using fish oil and red yeast rice  Recommended statin therapy which patient is not interested in at this time  Discussed the importance of weight loss daily activity low-fat low-cholesterol diet to help improve cholesterol levels  Discussed risks of poorly controlled cholesterol with patient  Discussed that cholesterol is part diet and part generic and some people tend to make more cholesterol than others and may not only be related to his diet    Will discuss further at follow-up

## 2019-11-29 NOTE — PATIENT INSTRUCTIONS
Hypercholesterolemia  The 10-year ASCVD risk score (Savi Babin et al , 2013) is: 16 3%    Values used to calculate the score:      Age: 59 years      Sex: Male      Is Non- : No      Diabetic: No      Tobacco smoker: No      Systolic Blood Pressure: 956 mmHg      Is BP treated: Yes      HDL Cholesterol: 37 mg/dL      Total Cholesterol: 156 mg/dL      Patient currently using fish oil and red yeast rice  Discussed statin therapy which patient is not interested in at this time  Discussed the importance of weight loss daily activity low-fat low-cholesterol diet to help improve cholesterol levels  Discussed that cholesterol is part diet and part generic and some people tend to make more cholesterol than others and may not only be related to his diet  Will discuss further at follow-up    History of gout  Patient has had no gout flare-up in some time  Previous flare up with secondary to starting blood pressure medicines likely hydrochlorothiazide  He has been off of this for some time  Patient would like to try and taper off of allopurinol because prior to HCTZ he had no prior gout history  Advised him to taper down his allopurinol dose  Start taking 1 tablet by mouth every other day until as allopurinol is gone then discontinue fully  Will repeat a uric acid prior to his next follow-up    Benign essential hypertension  Blood pressure slightly elevated today  Currently taking lisinopril 30 mg daily and Cardura 8 mg daily  Without any current symptoms  Advised him to continue to check his blood pressure at home  Discussed blood pressure goals of less than 130 over less than 80  Keep blood pressure log to bring to our follow-up  Weight loss and daily activity can also improved blood pressure readings    Follow-up in 2 months to discuss labs and blood pressure log

## 2019-11-29 NOTE — ASSESSMENT & PLAN NOTE
Previously patient had made significant improvement in his weight  Give encouragement as it appears that his weight has plateaued    Discussed daily exercise and dietary changes to help

## 2020-01-06 ENCOUNTER — OFFICE VISIT (OUTPATIENT)
Dept: INTERNAL MEDICINE CLINIC | Facility: CLINIC | Age: 65
End: 2020-01-06
Payer: COMMERCIAL

## 2020-01-06 VITALS
DIASTOLIC BLOOD PRESSURE: 70 MMHG | TEMPERATURE: 99.4 F | SYSTOLIC BLOOD PRESSURE: 126 MMHG | OXYGEN SATURATION: 96 % | HEART RATE: 116 BPM | WEIGHT: 253.8 LBS | HEIGHT: 70 IN | BODY MASS INDEX: 36.33 KG/M2

## 2020-01-06 DIAGNOSIS — Z12.11 SCREENING FOR MALIGNANT NEOPLASM OF COLON: Primary | ICD-10-CM

## 2020-01-06 DIAGNOSIS — J06.9 VIRAL URI: ICD-10-CM

## 2020-01-06 PROCEDURE — 3008F BODY MASS INDEX DOCD: CPT | Performed by: INTERNAL MEDICINE

## 2020-01-06 PROCEDURE — 99213 OFFICE O/P EST LOW 20 MIN: CPT | Performed by: INTERNAL MEDICINE

## 2020-01-06 RX ORDER — BENZONATATE 100 MG/1
100 CAPSULE ORAL 3 TIMES DAILY PRN
Qty: 20 CAPSULE | Refills: 0 | Status: SHIPPED | OUTPATIENT
Start: 2020-01-06 | End: 2020-01-31 | Stop reason: ALTCHOICE

## 2020-01-06 RX ORDER — OSELTAMIVIR PHOSPHATE 75 MG/1
75 CAPSULE ORAL EVERY 12 HOURS SCHEDULED
Qty: 10 CAPSULE | Refills: 0 | Status: SHIPPED | OUTPATIENT
Start: 2020-01-06 | End: 2020-01-11

## 2020-01-06 NOTE — PROGRESS NOTES
Assessment/Plan:    Viral URI  Will defer on antibiotics at this time however will initiate Tamiflu therapy  Will prescribe benzonatate 100 mg to be taken every 8 hours as needed for cough  Recommend continuing over-the-counter decongestant as well as starting a second-generation antihistamine such as loratadine  Discussed adequate oral hydration and rest   He is to contact office for worsening symptoms  Diagnoses and all orders for this visit:    Screening for malignant neoplasm of colon    Viral URI  -     benzonatate (TESSALON PERLES) 100 mg capsule; Take 1 capsule (100 mg total) by mouth 3 (three) times a day as needed for cough  -     oseltamivir (TAMIFLU) 75 mg capsule; Take 1 capsule (75 mg total) by mouth every 12 (twelve) hours for 5 days    Other orders  -     Cancel: Ambulatory referral to Gastroenterology; Future          BMI Counseling: Body mass index is 36 42 kg/m²  The BMI is above normal  Nutrition recommendations include decreasing portion sizes, encouraging healthy choices of fruits and vegetables, decreasing fast food intake, consuming healthier snacks, limiting drinks that contain sugar, moderation in carbohydrate intake, increasing intake of lean protein, reducing intake of saturated and trans fat and reducing intake of cholesterol  Exercise recommendations include moderate physical activity 150 minutes/week and exercising 3-5 times per week  No pharmacotherapy was ordered  Patient referred to PCP due to patient being overweight  Time spent during encounter: 15 minutes (counseling, reviewing medications, and discussing treatment and plan)    Subjective:      Patient ID: Rosalind Wheeler  is a 59 y o  male  Chief Complaint   Patient presents with    URI     Patient is here c/o cough, nasal congestion and pressure on forehead  Sx are going on since Saturday  60-year-old male is seen today with URI symptoms since 2 days duration  He denies any recent sick contact  He did not get his flu shot this year  URI    This is a new problem  The current episode started in the past 7 days  The problem has been unchanged  There has been no fever  Associated symptoms include congestion, coughing, headaches, a plugged ear sensation, rhinorrhea, sinus pain and a sore throat  Pertinent negatives include no abdominal pain, chest pain, diarrhea, dysuria, ear pain, joint pain, joint swelling, nausea, neck pain, rash, sneezing, swollen glands, vomiting or wheezing  Associated symptoms comments: Malaise   He has tried decongestant for the symptoms  The treatment provided mild relief  The following portions of the patient's history were reviewed and updated as appropriate: allergies, current medications, past family history, past medical history, past social history, past surgical history and problem list     Review of Systems   Constitutional: Negative  Negative for chills, fatigue and fever  HENT: Positive for congestion, rhinorrhea, sinus pain and sore throat  Negative for ear pain, postnasal drip and sneezing  Eyes: Negative  Respiratory: Positive for cough  Negative for chest tightness, shortness of breath and wheezing  Cardiovascular: Negative for chest pain and palpitations  Gastrointestinal: Negative for abdominal distention, abdominal pain, blood in stool, constipation, diarrhea, nausea and vomiting  Endocrine: Negative  Genitourinary: Negative for difficulty urinating, dysuria and hematuria  Musculoskeletal: Negative  Negative for joint pain and neck pain  Skin: Negative  Negative for rash  Allergic/Immunologic: Negative for environmental allergies and food allergies  Neurological: Positive for headaches  Hematological: Negative for adenopathy  Psychiatric/Behavioral: Negative for agitation, behavioral problems, confusion and sleep disturbance           Past Medical History:   Diagnosis Date    Blurred vision     Last assessed - 1/28/15    CAP (community acquired pneumonia)     Last assessed - 9/20/16    Hemorrhoids     Last assessed - 11/23/15    HTN (hypertension)     Hypercholesterolemia     IFG (impaired fasting glucose)     Last assessed - 11/18/14    Rotator cuff tendinitis     Last assessed - 6/17/15         Current Outpatient Medications:     aspirin 81 MG tablet, Take 1 tablet by mouth daily, Disp: , Rfl:     doxazosin (CARDURA) 8 MG tablet, Take 1 tablet (8 mg total) by mouth daily, Disp: 90 tablet, Rfl: 1    lisinopril (ZESTRIL) 30 mg tablet, Take 1 tablet (30 mg total) by mouth daily, Disp: 90 tablet, Rfl: 1    Omega-3 1000 MG CAPS, Take 1 capsule by mouth daily  , Disp: , Rfl:     Red Yeast Rice 600 MG CAPS, Take 1 capsule by mouth daily  , Disp: , Rfl:     allopurinol (ZYLOPRIM) 300 mg tablet, Take 1 tablet (300 mg total) by mouth daily (Patient not taking: Reported on 1/6/2020), Disp: 90 tablet, Rfl: 1    benzonatate (TESSALON PERLES) 100 mg capsule, Take 1 capsule (100 mg total) by mouth 3 (three) times a day as needed for cough, Disp: 20 capsule, Rfl: 0    oseltamivir (TAMIFLU) 75 mg capsule, Take 1 capsule (75 mg total) by mouth every 12 (twelve) hours for 5 days, Disp: 10 capsule, Rfl: 0    Allergies   Allergen Reactions    Penicillins Hives    Pollen Extract        Social History   Past Surgical History:   Procedure Laterality Date    APPENDECTOMY      Resolved - 1/24/09    COLONOSCOPY      WISDOM TOOTH EXTRACTION       Family History   Problem Relation Age of Onset    Hyperlipidemia Mother     Hypertension Mother     Pulmonary embolism Mother     Diabetes type II Mother     Hyperlipidemia Father     Hypertension Father     Prostate cancer Father     Diabetes type II Father        Objective:  /70 (BP Location: Left arm, Patient Position: Sitting, Cuff Size: Large)   Pulse (!) 116   Temp 99 4 °F (37 4 °C) (Oral)   Ht 5' 10" (1 778 m)   Wt 115 kg (253 lb 12 8 oz)   SpO2 96%   BMI 36 42 kg/m² Recent Results (from the past 1344 hour(s))   Lipid Panel with Direct LDL reflex    Collection Time: 11/13/19  9:21 AM   Result Value Ref Range    Cholesterol 156 50 - 200 mg/dL    Triglycerides 116 <=150 mg/dL    HDL, Direct 37 (L) >=40 mg/dL    LDL Calculated 96 0 - 100 mg/dL   Comprehensive metabolic panel    Collection Time: 11/13/19  9:21 AM   Result Value Ref Range    Sodium 145 136 - 145 mmol/L    Potassium 4 8 3 5 - 5 3 mmol/L    Chloride 112 (H) 100 - 108 mmol/L    CO2 28 21 - 32 mmol/L    ANION GAP 5 4 - 13 mmol/L    BUN 14 5 - 25 mg/dL    Creatinine 1 08 0 60 - 1 30 mg/dL    Glucose, Fasting 90 65 - 99 mg/dL    Calcium 8 9 8 3 - 10 1 mg/dL    AST 17 5 - 45 U/L    ALT 27 12 - 78 U/L    Alkaline Phosphatase 105 46 - 116 U/L    Total Protein 7 2 6 4 - 8 2 g/dL    Albumin 4 0 3 5 - 5 0 g/dL    Total Bilirubin 0 76 0 20 - 1 00 mg/dL    eGFR 72 ml/min/1 73sq m            Physical Exam   Constitutional: He is oriented to person, place, and time  He appears well-developed and well-nourished  No distress  HENT:   Head: Normocephalic and atraumatic  Mouth/Throat: Posterior oropharyngeal erythema present  Eyes: Pupils are equal, round, and reactive to light  Conjunctivae and EOM are normal  Right eye exhibits no discharge  Left eye exhibits no discharge  No scleral icterus  Neck: Normal range of motion  Neck supple  No JVD present  No thyromegaly present  Cardiovascular: Normal rate, regular rhythm, normal heart sounds and intact distal pulses  Exam reveals no gallop and no friction rub  No murmur heard  Pulmonary/Chest: Effort normal and breath sounds normal  No respiratory distress  He has no wheezes  He has no rales  He exhibits no tenderness  Abdominal: Soft  Bowel sounds are normal  He exhibits no distension and no mass  There is no tenderness  There is no rebound and no guarding  Musculoskeletal: Normal range of motion  He exhibits no edema, tenderness or deformity     Lymphadenopathy: He has no cervical adenopathy  Neurological: He is alert and oriented to person, place, and time  He has normal reflexes  No cranial nerve deficit  Coordination normal    Skin: Skin is warm and dry  No rash noted  He is not diaphoretic  No erythema  No pallor  Psychiatric: He has a normal mood and affect  His behavior is normal  Judgment and thought content normal    Nursing note and vitals reviewed

## 2020-01-06 NOTE — ASSESSMENT & PLAN NOTE
Will defer on antibiotics at this time however will initiate Tamiflu therapy  Will prescribe benzonatate 100 mg to be taken every 8 hours as needed for cough  Recommend continuing over-the-counter decongestant as well as starting a second-generation antihistamine such as loratadine  Discussed adequate oral hydration and rest   He is to contact office for worsening symptoms

## 2020-01-20 ENCOUNTER — APPOINTMENT (OUTPATIENT)
Dept: LAB | Age: 65
End: 2020-01-20
Payer: COMMERCIAL

## 2020-01-20 DIAGNOSIS — E78.00 HYPERCHOLESTEROLEMIA: ICD-10-CM

## 2020-01-20 DIAGNOSIS — Z87.39 HISTORY OF GOUT: ICD-10-CM

## 2020-01-20 DIAGNOSIS — R73.01 IMPAIRED FASTING GLUCOSE: ICD-10-CM

## 2020-01-20 DIAGNOSIS — I10 BENIGN ESSENTIAL HYPERTENSION: ICD-10-CM

## 2020-01-20 LAB
ALBUMIN SERPL BCP-MCNC: 3.4 G/DL (ref 3.5–5)
ALP SERPL-CCNC: 101 U/L (ref 46–116)
ALT SERPL W P-5'-P-CCNC: 28 U/L (ref 12–78)
ANION GAP SERPL CALCULATED.3IONS-SCNC: 4 MMOL/L (ref 4–13)
AST SERPL W P-5'-P-CCNC: 15 U/L (ref 5–45)
BILIRUB SERPL-MCNC: 0.78 MG/DL (ref 0.2–1)
BUN SERPL-MCNC: 19 MG/DL (ref 5–25)
CALCIUM SERPL-MCNC: 9.1 MG/DL (ref 8.3–10.1)
CHLORIDE SERPL-SCNC: 112 MMOL/L (ref 100–108)
CO2 SERPL-SCNC: 27 MMOL/L (ref 21–32)
CREAT SERPL-MCNC: 1.12 MG/DL (ref 0.6–1.3)
EST. AVERAGE GLUCOSE BLD GHB EST-MCNC: 111 MG/DL
GFR SERPL CREATININE-BSD FRML MDRD: 69 ML/MIN/1.73SQ M
GLUCOSE P FAST SERPL-MCNC: 99 MG/DL (ref 65–99)
HBA1C MFR BLD: 5.5 % (ref 4.2–6.3)
POTASSIUM SERPL-SCNC: 4.3 MMOL/L (ref 3.5–5.3)
PROT SERPL-MCNC: 7.2 G/DL (ref 6.4–8.2)
SODIUM SERPL-SCNC: 143 MMOL/L (ref 136–145)
URATE SERPL-MCNC: 4.6 MG/DL (ref 4.2–8)

## 2020-01-20 PROCEDURE — 36415 COLL VENOUS BLD VENIPUNCTURE: CPT

## 2020-01-20 PROCEDURE — 80053 COMPREHEN METABOLIC PANEL: CPT

## 2020-01-20 PROCEDURE — 83036 HEMOGLOBIN GLYCOSYLATED A1C: CPT

## 2020-01-20 PROCEDURE — 84550 ASSAY OF BLOOD/URIC ACID: CPT

## 2020-01-31 ENCOUNTER — OFFICE VISIT (OUTPATIENT)
Dept: INTERNAL MEDICINE CLINIC | Facility: CLINIC | Age: 65
End: 2020-01-31
Payer: COMMERCIAL

## 2020-01-31 VITALS
SYSTOLIC BLOOD PRESSURE: 126 MMHG | HEART RATE: 88 BPM | HEIGHT: 70 IN | TEMPERATURE: 98.3 F | WEIGHT: 257.8 LBS | DIASTOLIC BLOOD PRESSURE: 70 MMHG | OXYGEN SATURATION: 98 % | BODY MASS INDEX: 36.91 KG/M2

## 2020-01-31 DIAGNOSIS — J06.9 VIRAL URI: ICD-10-CM

## 2020-01-31 DIAGNOSIS — Z87.39 HISTORY OF GOUT: ICD-10-CM

## 2020-01-31 DIAGNOSIS — E78.00 HYPERCHOLESTEROLEMIA: ICD-10-CM

## 2020-01-31 DIAGNOSIS — K64.8 OTHER HEMORRHOIDS: ICD-10-CM

## 2020-01-31 DIAGNOSIS — R73.01 IMPAIRED FASTING GLUCOSE: ICD-10-CM

## 2020-01-31 DIAGNOSIS — Z86.010 HX OF COLONIC POLYPS: ICD-10-CM

## 2020-01-31 DIAGNOSIS — Z71.85 VACCINE COUNSELING: ICD-10-CM

## 2020-01-31 DIAGNOSIS — I10 BENIGN ESSENTIAL HYPERTENSION: ICD-10-CM

## 2020-01-31 DIAGNOSIS — Z12.11 ENCOUNTER FOR SCREENING FOR MALIGNANT NEOPLASM OF COLON: Primary | ICD-10-CM

## 2020-01-31 PROCEDURE — 99214 OFFICE O/P EST MOD 30 MIN: CPT | Performed by: PHYSICIAN ASSISTANT

## 2020-01-31 PROCEDURE — 1036F TOBACCO NON-USER: CPT | Performed by: PHYSICIAN ASSISTANT

## 2020-01-31 PROCEDURE — 3078F DIAST BP <80 MM HG: CPT | Performed by: PHYSICIAN ASSISTANT

## 2020-01-31 PROCEDURE — 3074F SYST BP LT 130 MM HG: CPT | Performed by: PHYSICIAN ASSISTANT

## 2020-01-31 NOTE — ASSESSMENT & PLAN NOTE
Discussed with patient current BMI and weight  Today patient weighs 257 lb  Discussed small weight loss goals prior to follow-up visit encouraged 1 lb weight loss every 3 weeks for a reasonable weight loss goal   Well-balanced diet and daily exercise can help with weight management    Portion control and choosing healthy snacks

## 2020-01-31 NOTE — ASSESSMENT & PLAN NOTE
Patient deferred rectal exam as this has been something he has had several times in the past   Discussed causes and symptoms of hemorrhoids with patient  Medical management includes treatment with stool softeners, dietary modifications (increased fiber and water intake), warm water sitz baths and topical over the counter preparations  Decrease time spent on toilet as prolonged toilet time can worsen symptoms  Use of baby wipes or moistened towelettes can also provide relief and prevent further irritation  OTC sitz baths can provide relief during an acute flare-up by increasing blood flow to the area, relaxing muscles and promoting faster healing  Discussed that patient may continue to get episodic flare-ups and in some cases patients elect to pursue surgical intervention for recurrent symptomatic hemorrhoids  Sitz Bath   You can use your normal bath to have a sitz bath, but this isn't always convenient, especially if mobility problems affect you getting in and out of a bathtub or you don't want to run so much water   Another option is to buy a relatively inexpensive portable plastic sitz bath  also sold labelled as a portable bidet  which fits on top of your toilet  It may come with a soap dish at front or with a bag and tubing to fill the bowl while you sit on it   Regardless of the option you choose, make sure the bath is thoroughly clean before preparing it  [address] it two-thirds of the way with warm water at 3739°C    o You can check the temperature with your wrist  if the water is too warm for your wrist, it's too warm for your bottom  o Add 1/21 tablespoon of bicarbonate of soda or 12 teaspoons of salt to the water and swirl until dissolved  Slowly sit down so the displaced water has time to drain through the openings in the sitz bath    o Soak in the water for 10  15 minutes two times per day for relief      o Once you have finished your bath, dry yourself carefully, either patting the area gently with a lint-free towel - try not to rub the area

## 2020-01-31 NOTE — ASSESSMENT & PLAN NOTE
Blood pressure well controlled at this time on lisinopril 30 mg daily and Cardura 8 mg daily  No change to blood pressure medicines at this time  Will continue to follow

## 2020-01-31 NOTE — PROGRESS NOTES
[facility]  Standard Office Visit  Patient ID: Elma Cruz  : 1955  Age/Gender: 59 y o  male     DATE: 2020      Assessment/Plan:    Impaired fasting glucose  A1c 5 5, encouraged low carb diet, daily activity  Avoid juices, teas soda  Avoid liquid calories  Benign essential hypertension  Blood pressure well controlled at this time on lisinopril 30 mg daily and Cardura 8 mg daily  No change to blood pressure medicines at this time  Will continue to follow  BMI 36 0-36 9,adult  Discussed with patient current BMI and weight  Today patient weighs 257 lb  Discussed small weight loss goals prior to follow-up visit encouraged 1 lb weight loss every 3 weeks for a reasonable weight loss goal   Well-balanced diet and daily exercise can help with weight management  Portion control and choosing healthy snacks    History of gout  Patient since last visit was using allopurinol every other day  Per history it appears that his gout flare up with secondary to starting hydrochlorothiazide which he has not been on for some time  Uric acid level noted today  Will continue to monitor patient off allopurinol and check uric acid level with next lab set  Hypercholesterolemia  Patient currently being managed when red yeast rice with Omega 3 fish oil  Encouraged weight loss diet lifestyle modification to control cholesterol  Previously had discussions regarding statin medications  Will check lipid panel prior to next visit and pending results will discuss management going further  Other hemorrhoids  Patient deferred rectal exam as this has been something he has had several times in the past   Discussed causes and symptoms of hemorrhoids with patient    Medical management includes treatment with stool softeners, dietary modifications (increased fiber and water intake), warm water sitz baths and topical over the counter preparations  Decrease time spent on toilet as prolonged toilet time can worsen symptoms  Use of baby wipes or moistened towelettes can also provide relief and prevent further irritation  OTC sitz baths can provide relief during an acute flare-up by increasing blood flow to the area, relaxing muscles and promoting faster healing  Discussed that patient may continue to get episodic flare-ups and in some cases patients elect to pursue surgical intervention for recurrent symptomatic hemorrhoids  Sitz Bath   You can use your normal bath to have a sitz bath, but this isn't always convenient, especially if mobility problems affect you getting in and out of a bathtub or you don't want to run so much water   Another option is to buy a relatively inexpensive portable plastic sitz bath  also sold labelled as a portable bidet  which fits on top of your toilet  It may come with a soap dish at front or with a bag and tubing to fill the bowl while you sit on it   Regardless of the option you choose, make sure the bath is thoroughly clean before preparing it  [address] it two-thirds of the way with warm water at 3739°C    o You can check the temperature with your wrist  if the water is too warm for your wrist, it's too warm for your bottom  o Add 1/21 tablespoon of bicarbonate of soda or 12 teaspoons of salt to the water and swirl until dissolved  Slowly sit down so the displaced water has time to drain through the openings in the sitz bath    o Soak in the water for 10  15 minutes two times per day for relief  o Once you have finished your bath, dry yourself carefully, either patting the area gently with a lint-free towel - try not to rub the area       Viral URI  Resolved       Diagnoses and all orders for this visit:    Encounter for screening for malignant neoplasm of colon  -     Ambulatory referral to Colorectal Surgery;  Future    Hx of colonic polyps  -     Ambulatory referral to Colorectal Surgery; Future    Impaired fasting glucose  -     Lipid Panel with Direct LDL reflex; Future    Viral URI    Benign essential hypertension  -     CBC and differential; Future  -     Comprehensive metabolic panel; Future    BMI 36 0-36 9,adult  -     Lipid Panel with Direct LDL reflex; Future    History of gout  -     Uric acid; Future    Hypercholesterolemia  -     CBC and differential; Future  -     Comprehensive metabolic panel; Future  -     Lipid Panel with Direct LDL reflex; Future    Other hemorrhoids  -     hydrocortisone (ANUSOL-HC, PROCTOSOL HC,) 2 5 % rectal cream; Insert into the rectum 3 (three) times a day as needed for hemorrhoids  -     Ambulatory referral to Colorectal Surgery; Future    Vaccine counseling  Comments:  Discussed vaccines at length  Recommended flu and shingrix now  Discused that he should get pneumonia vaccines once he turns 72  Patient deferred vaccines  Other orders  -     Cancel: Ambulatory referral to Gastroenterology; Future          BMI Counseling: Body mass index is 36 99 kg/m²  The BMI is above normal  Nutrition recommendations include decreasing portion sizes, encouraging healthy choices of fruits and vegetables, consuming healthier snacks, increasing intake of lean protein, reducing intake of saturated and trans fat and reducing intake of cholesterol  Exercise recommendations include exercising 3-5 times per week  No pharmacotherapy was ordered  Patient referred to PCP due to patient being overweight  Depression Screening and Follow-up Plan: Patient's depression screening was positive with a PHQ-2 score of 0  Clincally patient does not have depression  No treatment is required  Subjective:   Chief Complaint   Patient presents with    Follow-up     no new concerns, no refills, [facility] 01/20/2020         Pedro Brink  is a 59 y o  male who presents to the office on 1/31/2020 for     Patient here today for follow-up for chronic conditions  No current complaints or concerns at this time other than he notes that he has intermittent flare ups of his chronic hemorrhoids and would like a refill of a cream he previously was prescribed  He notes that he is not seeing any blood in the stool  No dark tarry stools  He notes that he has slight itchiness in the perirectal area  He was told in the past to trying do Sitz baths however he does not have a bathtub at home  He try preparation H without improvement  He is here today for to follow-up laboratory studies  Has not been taking his gout medications as he is trying to wean off of this medication as we discussed in the past   No recurrence of gout  He notes that he did not follow a good diet over the holidays but is trying to get back on track  He does not do any daily exercise  He works at [employer] in notes that he does get some activity with work but does not formally exercise outside of work  He notes that he is due for a colonoscopy  He has had a colonoscopy in the past but had been putting off his follow-up colonoscopy until after the holidays  He now plans to call and schedule  He has their number  He was seen for a sick visit earlier this month for cold symptoms  He notes that he no longer is having chest congestion  He still has a slight tickle in his throat which is resolving  Hypertension   This is a chronic problem  The current episode started more than 1 year ago  The problem is controlled  Pertinent negatives include no chest pain, palpitations, peripheral edema or shortness of breath  There are no associated agents to hypertension  Risk factors for coronary artery disease include male gender, obesity, dyslipidemia and family history  Past treatments include alpha 1 blockers and ACE inhibitors  The current treatment provides significant improvement  There is no history of angina or CAD/MI  Hyperlipidemia   This is a chronic problem   Recent lipid tests were reviewed and are variable  Exacerbating diseases include obesity  He has no history of diabetes  Pertinent negatives include no chest pain or shortness of breath  Current antihyperlipidemic treatment includes herbal therapy (Fish oil)  Compliance problems include adherence to exercise and adherence to diet  Risk factors for coronary artery disease include obesity, male sex, hypertension, dyslipidemia and family history  The following portions of the patient's history were reviewed and updated as appropriate: allergies, current medications, past family history, past medical history, past social history, past surgical history and problem list     Review of Systems   Constitutional: Negative for chills and fever  Respiratory: Positive for cough (Improving since recent cold  )  Negative for shortness of breath and wheezing  Cardiovascular: Negative for chest pain and palpitations  Gastrointestinal: Negative for abdominal distention, abdominal pain, anal bleeding, blood in stool, constipation, diarrhea, nausea, rectal pain and vomiting  Genitourinary: Negative for dysuria, frequency and urgency  Skin: Negative for rash  Neurological: Negative for dizziness, syncope, weakness and light-headedness  Psychiatric/Behavioral: Negative for agitation           Patient Active Problem List   Diagnosis    Benign essential hypertension    Hypercholesterolemia    History of gout    BMI 36 0-36 9,adult    Impaired fasting glucose    Family history of malignant neoplasm of prostate    Other hemorrhoids       Past Medical History:   Diagnosis Date    Blurred vision     Last assessed - 1/28/15    CAP (community acquired pneumonia)     Last assessed - 9/20/16    Hemorrhoids     Last assessed - 11/23/15    HTN (hypertension)     Hypercholesterolemia     IFG (impaired fasting glucose)     Last assessed - 11/18/14    Rotator cuff tendinitis     Last assessed - 6/17/15       Past Surgical History:   Procedure Laterality Date    APPENDECTOMY      Resolved - 1/24/09    COLONOSCOPY      WISDOM TOOTH EXTRACTION           Current Outpatient Medications:     aspirin 81 MG tablet, Take 1 tablet by mouth daily, Disp: , Rfl:     doxazosin (CARDURA) 8 MG tablet, Take 1 tablet (8 mg total) by mouth daily, Disp: 90 tablet, Rfl: 1    lisinopril (ZESTRIL) 30 mg tablet, Take 1 tablet (30 mg total) by mouth daily, Disp: 90 tablet, Rfl: 1    Omega-3 1000 MG CAPS, Take 1 capsule by mouth daily  , Disp: , Rfl:     Red Yeast Rice 600 MG CAPS, Take 1 capsule by mouth daily  , Disp: , Rfl:     hydrocortisone (ANUSOL-HC, PROCTOSOL HC,) 2 5 % rectal cream, Insert into the rectum 3 (three) times a day as needed for hemorrhoids, Disp: 28 35 g, Rfl: 1    Allergies   Allergen Reactions    Penicillins Hives    Pollen Extract        Social History     Socioeconomic History    Marital status: /Civil Union     Spouse name: None    Number of children: None    Years of education: None    Highest education level: None   Occupational History    Occupation: [employer]     Employer: [employer]   Social Needs    Financial resource strain: None    Food insecurity:     Worry: None     Inability: None    Transportation needs:     Medical: None     Non-medical: None   Tobacco Use    Smoking status: Never Smoker    Smokeless tobacco: Never Used   Substance and Sexual Activity    Alcohol use:  Yes     Alcohol/week: 1 0 standard drinks     Types: 1 Cans of beer per week     Frequency: 2-3 times a week     Drinks per session: 3 or 4     Binge frequency: Monthly     Comment: Socially    Drug use: No    Sexual activity: Yes   Lifestyle    Physical activity:     Days per week: None     Minutes per session: None    Stress: None   Relationships    Social connections:     Talks on phone: None     Gets together: None     Attends [employer] service: None     Active member of club or organization: None     Attends meetings of clubs or organizations: None Relationship status: None    Intimate partner violence:     Fear of current or ex partner: None     Emotionally abused: None     Physically abused: None     Forced sexual activity: None   Other Topics Concern    None   Social History Narrative    Travel Hx - Pt denies being out of the country during 1/2014-11/18/2014       Family History   Problem Relation Age of Onset    Hyperlipidemia Mother     Hypertension Mother     Pulmonary embolism Mother     Diabetes type II Mother     Hyperlipidemia Father     Hypertension Father     Prostate cancer Father     Diabetes type II Father        PHQ-9 Depression Screening    PHQ-9:    Frequency of the following problems over the past two weeks:       Little interest or pleasure in doing things:  0 - not at all  Feeling down, depressed, or hopeless:  0 - not at all  PHQ-2 Score:  0         Health Maintenance   Topic Date Due    DTaP,Tdap,and Td Vaccines (1 - Tdap) 08/06/1966    HIV Screening  08/06/1970    Annual Physical  08/06/1973    CRC Screening: Colonoscopy  11/20/2019    Influenza Vaccine  02/27/2020 (Originally 7/1/2019)    Depression Screening PHQ  04/05/2020    Pneumococcal Vaccine: 65+ Years (1 of 2 - PCV13) 08/06/2020    BMI: Followup Plan  01/06/2021    BMI: Adult  01/31/2021    Hepatitis C Screening  Completed    Pneumococcal Vaccine: Pediatrics (0 to 5 Years) and At-Risk Patients (6 to 59 Years)  Aged Out    HIB Vaccine  Aged Out    Hepatitis B Vaccine  Aged Out    IPV Vaccine  Aged Out    Hepatitis A Vaccine  Aged Out    Meningococcal ACWY Vaccine  Aged Out    HPV Vaccine  Aged [employer] History   Administered Date(s) Administered    Influenza Quadrivalent Preservative Free 3 years and older IM 11/29/2017    Influenza Quadrivalent, 6-35 Months IM 11/21/2016    Td (adult), adsorbed 09/01/2007        Objective:  Vitals:    01/31/20 1431 01/31/20 1451   BP: 140/80 126/70   BP Location: Left arm    Patient Position: Sitting Cuff Size: Large    Pulse: 88    Temp: 98 3 °F (36 8 °C)    TempSrc: Oral    SpO2: 98%    Weight: 117 kg (257 lb 12 8 oz)    Height: 5' 10" (1 778 m)      Wt Readings from Last 3 Encounters:   01/31/20 117 kg (257 lb 12 8 oz)   01/06/20 115 kg (253 lb 12 8 oz)   11/29/19 112 kg (246 lb 12 8 oz)     Body mass index is 36 99 kg/m²  No exam data present       Physical Exam   Constitutional: He appears well-developed and well-nourished  No distress  Alert pleasant cooperative  Seated in room in no acute distress  HENT:   Head: Normocephalic and atraumatic  Mouth/Throat: No oropharyngeal exudate  Eyes: Pupils are equal, round, and reactive to light  Right eye exhibits no discharge  Left eye exhibits no discharge  No scleral icterus  Neck: Neck supple  Cardiovascular: Normal rate, regular rhythm and normal heart sounds  No murmur heard  Regular rate and rhythm   Pulmonary/Chest: Effort normal and breath sounds normal  No respiratory distress  He has no wheezes  He has no rales  To auscultation  No crackles no rhonchi no wheeze   Abdominal: Soft  Bowel sounds are normal  He exhibits no distension  There is no tenderness  There is no guarding  Positive bowel sounds  Soft nontender nondistended   Genitourinary:   Genitourinary Comments: Deferred per patient's request   Musculoskeletal: He exhibits no edema  Neurological: He is alert  Skin: Skin is warm and dry  He is not diaphoretic  Psychiatric: He has a normal mood and affect  His behavior is normal  Thought content normal    Nursing note and vitals reviewed  Future Appointments   Date Time Provider Phyllis Cecilia   6/12/2020  2:30 PM Cortez Liao PA-C [address] [place] [address]    Patient Care Team:  Cortez Liao PA-C as PCP - General (Internal Medicine)    This note was completed in part utilizing [employer] Direct Software    Grammatical errors, random word insertions, spelling mistakes, and incomplete sentences can be an occasional consequence of this system secondary to software limitations, ambient noise, and hardware issues  If you have any questions or concerns about the content, text, or information contained within the body of this dictation, please contact the provider for clarification

## 2020-01-31 NOTE — PATIENT INSTRUCTIONS
Impaired fasting glucose  A1c 5 5, encouraged low carb diet, daily activity  Avoid juices, teas soda  Avoid liquid calories  Benign essential hypertension  Blood pressure well controlled at this time on lisinopril 30 mg daily and Cardura 8 mg daily  No change to blood pressure medicines at this time  Will continue to follow  BMI 36 0-36 9,adult  Discussed with patient current BMI and weight  Today patient weighs 257 lb  Discussed small weight loss goals prior to follow-up visit encouraged 1 lb weight loss every 3 weeks for a reasonable weight loss goal   Well-balanced diet and daily exercise can help with weight management  Portion control and choosing healthy snacks    History of gout  Patient since last visit was using allopurinol every other day  Per history it appears that his gout flare up with secondary to starting hydrochlorothiazide which he has not been on for some time  Uric acid level noted today  Will continue to monitor patient off allopurinol and check uric acid level with next lab set  Hypercholesterolemia  Patient currently being managed when red yeast rice with Omega 3 fish oil  Encouraged weight loss diet lifestyle modification to control cholesterol  Previously had discussions regarding statin medications  Will check lipid panel prior to next visit and pending results will discuss management going further  Other hemorrhoids  Discussed causes and symptoms of hemorrhoids with patient  Medical management includes treatment with stool softeners, dietary modifications (increased fiber and water intake), warm water sitz baths and topical over the counter preparations  Decrease time spent on toilet as prolonged toilet time can worsen symptoms  Use of baby wipes or moistened towelettes can also provide relief and prevent further irritation    OTC sitz baths can provide relief during an acute flare-up by increasing blood flow to the area, relaxing muscles and promoting faster healing  Discussed that patient may continue to get episodic flare-ups and in some cases patients elect to pursue surgical intervention for recurrent symptomatic hemorrhoids  Sitz Bath   You can use your normal bath to have a sitz bath, but this isn't always convenient, especially if mobility problems affect you getting in and out of a bathtub or you don't want to run so much water   Another option is to buy a relatively inexpensive portable plastic sitz bath  also sold labelled as a portable bidet  which fits on top of your toilet  It may come with a soap dish at front or with a bag and tubing to fill the bowl while you sit on it   Regardless of the option you choose, make sure the bath is thoroughly clean before preparing it  [address] it two-thirds of the way with warm water at 3739°C    o You can check the temperature with your wrist  if the water is too warm for your wrist, it's too warm for your bottom  o Add 1/21 tablespoon of bicarbonate of soda or 12 teaspoons of salt to the water and swirl until dissolved  Slowly sit down so the displaced water has time to drain through the openings in the sitz bath    o Soak in the water for 10  15 minutes two times per day for relief      o Once you have finished your bath, dry yourself carefully, either patting the area gently with a lint-free towel - try not to rub the area

## 2020-01-31 NOTE — ASSESSMENT & PLAN NOTE
Patient since last visit was using allopurinol every other day  Per history it appears that his gout flare up with secondary to starting hydrochlorothiazide which he has not been on for some time  Uric acid level noted today  Will continue to monitor patient off allopurinol and check uric acid level with next lab set

## 2020-01-31 NOTE — ASSESSMENT & PLAN NOTE
Patient currently being managed when red yeast rice with Omega 3 fish oil  Encouraged weight loss diet lifestyle modification to control cholesterol  Previously had discussions regarding statin medications  Will check lipid panel prior to next visit and pending results will discuss management going further

## 2020-04-23 DIAGNOSIS — I10 BENIGN ESSENTIAL HYPERTENSION: ICD-10-CM

## 2020-04-23 RX ORDER — DOXAZOSIN 8 MG/1
8 TABLET ORAL DAILY
Qty: 90 TABLET | Refills: 1 | Status: SHIPPED | OUTPATIENT
Start: 2020-04-23 | End: 2020-07-31 | Stop reason: SDUPTHER

## 2020-06-19 ENCOUNTER — LAB (OUTPATIENT)
Dept: LAB | Facility: MEDICAL CENTER | Age: 65
End: 2020-06-19
Payer: COMMERCIAL

## 2020-06-19 DIAGNOSIS — I10 BENIGN ESSENTIAL HYPERTENSION: ICD-10-CM

## 2020-06-19 DIAGNOSIS — Z87.39 HISTORY OF GOUT: ICD-10-CM

## 2020-06-19 DIAGNOSIS — R73.01 IMPAIRED FASTING GLUCOSE: ICD-10-CM

## 2020-06-19 DIAGNOSIS — E78.00 HYPERCHOLESTEROLEMIA: ICD-10-CM

## 2020-06-19 LAB
ALBUMIN SERPL BCP-MCNC: 3.8 G/DL (ref 3.5–5)
ALP SERPL-CCNC: 105 U/L (ref 46–116)
ALT SERPL W P-5'-P-CCNC: 26 U/L (ref 12–78)
ANION GAP SERPL CALCULATED.3IONS-SCNC: 4 MMOL/L (ref 4–13)
AST SERPL W P-5'-P-CCNC: 16 U/L (ref 5–45)
BASOPHILS # BLD AUTO: 0.06 THOUSANDS/ΜL (ref 0–0.1)
BASOPHILS NFR BLD AUTO: 1 % (ref 0–1)
BILIRUB SERPL-MCNC: 0.66 MG/DL (ref 0.2–1)
BUN SERPL-MCNC: 15 MG/DL (ref 5–25)
CALCIUM SERPL-MCNC: 9.5 MG/DL (ref 8.3–10.1)
CHLORIDE SERPL-SCNC: 110 MMOL/L (ref 100–108)
CO2 SERPL-SCNC: 26 MMOL/L (ref 21–32)
CREAT SERPL-MCNC: 1.2 MG/DL (ref 0.6–1.3)
EOSINOPHIL # BLD AUTO: 0.44 THOUSAND/ΜL (ref 0–0.61)
EOSINOPHIL NFR BLD AUTO: 6 % (ref 0–6)
ERYTHROCYTE [DISTWIDTH] IN BLOOD BY AUTOMATED COUNT: 12.5 % (ref 11.6–15.1)
GFR SERPL CREATININE-BSD FRML MDRD: 64 ML/MIN/1.73SQ M
GLUCOSE P FAST SERPL-MCNC: 100 MG/DL (ref 65–99)
HCT VFR BLD AUTO: 46.4 % (ref 36.5–49.3)
HGB BLD-MCNC: 15.2 G/DL (ref 12–17)
IMM GRANULOCYTES # BLD AUTO: 0.02 THOUSAND/UL (ref 0–0.2)
IMM GRANULOCYTES NFR BLD AUTO: 0 % (ref 0–2)
LYMPHOCYTES # BLD AUTO: 1.62 THOUSANDS/ΜL (ref 0.6–4.47)
LYMPHOCYTES NFR BLD AUTO: 20 % (ref 14–44)
MCH RBC QN AUTO: 30.8 PG (ref 26.8–34.3)
MCHC RBC AUTO-ENTMCNC: 32.8 G/DL (ref 31.4–37.4)
MCV RBC AUTO: 94 FL (ref 82–98)
MONOCYTES # BLD AUTO: 0.55 THOUSAND/ΜL (ref 0.17–1.22)
MONOCYTES NFR BLD AUTO: 7 % (ref 4–12)
NEUTROPHILS # BLD AUTO: 5.38 THOUSANDS/ΜL (ref 1.85–7.62)
NEUTS SEG NFR BLD AUTO: 66 % (ref 43–75)
NRBC BLD AUTO-RTO: 0 /100 WBCS
PLATELET # BLD AUTO: 220 THOUSANDS/UL (ref 149–390)
PMV BLD AUTO: 11.3 FL (ref 8.9–12.7)
POTASSIUM SERPL-SCNC: 4.9 MMOL/L (ref 3.5–5.3)
PROT SERPL-MCNC: 7.4 G/DL (ref 6.4–8.2)
RBC # BLD AUTO: 4.94 MILLION/UL (ref 3.88–5.62)
SODIUM SERPL-SCNC: 140 MMOL/L (ref 136–145)
URATE SERPL-MCNC: 7.8 MG/DL (ref 4.2–8)
WBC # BLD AUTO: 8.07 THOUSAND/UL (ref 4.31–10.16)

## 2020-06-19 PROCEDURE — 80053 COMPREHEN METABOLIC PANEL: CPT

## 2020-06-19 PROCEDURE — 84550 ASSAY OF BLOOD/URIC ACID: CPT

## 2020-06-19 PROCEDURE — 80061 LIPID PANEL: CPT

## 2020-06-19 PROCEDURE — 85025 COMPLETE CBC W/AUTO DIFF WBC: CPT

## 2020-06-19 PROCEDURE — 36415 COLL VENOUS BLD VENIPUNCTURE: CPT

## 2020-06-23 LAB
CHOLEST SERPL-MCNC: 169 MG/DL (ref 50–200)
HDLC SERPL-MCNC: 40 MG/DL
LDLC SERPL CALC-MCNC: 106 MG/DL (ref 0–100)
TRIGL SERPL-MCNC: 117 MG/DL

## 2020-06-29 ENCOUNTER — TELEPHONE (OUTPATIENT)
Dept: INTERNAL MEDICINE CLINIC | Age: 65
End: 2020-06-29

## 2020-06-29 NOTE — TELEPHONE ENCOUNTER
----- Message from Ronnell Severs, PA-C sent at 6/26/2020  6:37 PM EDT -----  Please see that patient scheduled follow-up to discuss test results 3-4 weeks  Thanks  Ino

## 2020-07-31 ENCOUNTER — OFFICE VISIT (OUTPATIENT)
Dept: INTERNAL MEDICINE CLINIC | Facility: CLINIC | Age: 65
End: 2020-07-31
Payer: COMMERCIAL

## 2020-07-31 VITALS
WEIGHT: 254 LBS | DIASTOLIC BLOOD PRESSURE: 72 MMHG | OXYGEN SATURATION: 97 % | HEIGHT: 70 IN | TEMPERATURE: 97.9 F | HEART RATE: 90 BPM | BODY MASS INDEX: 36.36 KG/M2 | SYSTOLIC BLOOD PRESSURE: 118 MMHG

## 2020-07-31 DIAGNOSIS — E78.00 HYPERCHOLESTEROLEMIA: ICD-10-CM

## 2020-07-31 DIAGNOSIS — Z87.39 HISTORY OF GOUT: ICD-10-CM

## 2020-07-31 DIAGNOSIS — I10 BENIGN ESSENTIAL HYPERTENSION: ICD-10-CM

## 2020-07-31 DIAGNOSIS — Z63.4 BEREAVEMENT: ICD-10-CM

## 2020-07-31 DIAGNOSIS — R73.01 IMPAIRED FASTING GLUCOSE: Primary | ICD-10-CM

## 2020-07-31 PROCEDURE — 3008F BODY MASS INDEX DOCD: CPT | Performed by: PHYSICIAN ASSISTANT

## 2020-07-31 PROCEDURE — 99213 OFFICE O/P EST LOW 20 MIN: CPT | Performed by: PHYSICIAN ASSISTANT

## 2020-07-31 PROCEDURE — 1036F TOBACCO NON-USER: CPT | Performed by: PHYSICIAN ASSISTANT

## 2020-07-31 PROCEDURE — 3078F DIAST BP <80 MM HG: CPT | Performed by: PHYSICIAN ASSISTANT

## 2020-07-31 PROCEDURE — 3074F SYST BP LT 130 MM HG: CPT | Performed by: PHYSICIAN ASSISTANT

## 2020-07-31 RX ORDER — LISINOPRIL 30 MG/1
30 TABLET ORAL DAILY
Qty: 90 TABLET | Refills: 1 | Status: SHIPPED | OUTPATIENT
Start: 2020-07-31 | End: 2021-04-23 | Stop reason: SDUPTHER

## 2020-07-31 RX ORDER — DOXAZOSIN 8 MG/1
8 TABLET ORAL DAILY
Qty: 90 TABLET | Refills: 1 | Status: SHIPPED | OUTPATIENT
Start: 2020-07-31 | End: 2021-04-23 | Stop reason: SDUPTHER

## 2020-07-31 SDOH — SOCIAL STABILITY - SOCIAL INSECURITY: DISSAPEARANCE AND DEATH OF FAMILY MEMBER: Z63.4

## 2020-07-31 NOTE — PROGRESS NOTES
[facility] [place]  Patient ID: Dannielle Figures  : 1955  Age/Gender: 59 y o  male     DATE: 2020      Assessment/Plan:    Impaired fasting glucose  Labs reviewed  Will check A1c with next lab set    Benign essential hypertension  Blood pressure stable on  No change in blood pressure medications continue lisinopril and Cardura as patient is not interested in medication change in that this time    Hypercholesterolemia  Reviewed lipids  Due to ASCVD risk patient is in statin benefit group but patient is not interested in statins prefers to use fish oil and red yeast rice at this time  Will readdress at future visits    History of gout  Slight elevated uric acid on last lab however patient notes he was not eating well at that time  He has since gotten back on track with his diet  Will check uric acid level and pending the results may need to resume allopurinol    BMI 36 0-36 9,adult  Give encouragement for daily exercise and do something small for himself each day with a rate is going for a walker fishing with his grandson  Low-fat low-cholesterol diet    Bereavement  Patient recently lost his wife  Give my condolences  Discussed Breeze mint therapy and counseling which patient is not interested in at this time  He has a good family social support and friends who we talks too  Discussed how therapy and in some circumstances medications may be helpful  Patient verbalized understanding med is not interested in therapy or any assistance at this time  I let him know that we are always here in the event that he needs anything  Otherwise routine follow-up in 3 months  Diagnoses and all orders for this visit:    Impaired fasting glucose  -     Comprehensive metabolic panel; Future  -     HEMOGLOBIN A1C W/ EAG ESTIMATION; Future    Benign essential hypertension  -     lisinopril (ZESTRIL) 30 mg tablet;  Take 1 tablet (30 mg total) by mouth daily  -     doxazosin (CARDURA) 8 MG tablet; Take 1 tablet (8 mg total) by mouth daily  -     Comprehensive metabolic panel; Future  -     CBC and differential; Future    Hypercholesterolemia  -     Comprehensive metabolic panel; Future  -     Lipid Panel with Direct LDL reflex; Future    BMI 36 0-36 9,adult    History of gout  -     Uric acid; Future    Bereavement                Subjective:   Chief Complaint   Patient presents with    Follow-up     Patient is here for 6 months f/u HTN, Hypercholesterolemia and review blood work  Pt does not have any new medical concerns  Pt appear sad, he lost his wife  Shania Montgomery  is a 59 y o  male who presents to the office on 7/31/2020 for     For follow up  He notes that his wife passed away  He panicked and called 911  He notes his wife had a pacemaker  His wife had a pacemaker place several years ago but was recently checked  He had friends he talks with as well as his   He does not follow with a counselor  He has not had coloscopy or screening  He notes he took off from work so that he could care for her  He notes he was planning on retiring but planned to go back the end of the year  He notes that he is getting used to living alone  Does have a daughter with whom he is very close with  His daughter lives next door to him  He has 4 grandchildren  Patient notes that he has had several losses in the last 3 years(wife, brother in law, sister and mom)  He does not see a counselor is not interested to do so  He is talking with a past at the [employer] which helps and has good conversations with his friends and daughter who is supportive  Not interested in any medications  He notes that some symptoms are improving with time but it is a very hard adjustment to make  He reports that he is taking care of himself and preparing meals regularly  No recent gout flares  Taking all medications as directed    No urinary symptoms  No chest pain or palpitations  No lightheadedness or dizziness  Not interested in cholesterol medications at this time  Taking fish oil and red yeast rice  Hypertension   This is a chronic problem  The current episode started more than 1 year ago  The problem is controlled  Pertinent negatives include no chest pain, headaches, palpitations, peripheral edema or shortness of breath  There are no associated agents to hypertension  Past treatments include ACE inhibitors and alpha 1 blockers  Hyperlipidemia   This is a chronic problem  The problem is controlled  Recent lipid tests were reviewed and are variable  Exacerbating diseases include obesity  He has no history of diabetes  Pertinent negatives include no chest pain, myalgias or shortness of breath  The current treatment provides mild improvement of lipids  The following portions of the patient's history were reviewed and updated as appropriate: allergies, current medications, past family history, past medical history, past social history, past surgical history and problem list     Review of Systems   Constitutional: Negative for chills, fever and unexpected weight change  HENT:        No recent cold symptoms   Respiratory: Negative for cough, shortness of breath and wheezing  Cardiovascular: Negative for chest pain and palpitations  Gastrointestinal: Negative for abdominal pain, diarrhea, nausea and vomiting  Patient notes he plans to call and schedule colonoscopy  Genitourinary: Negative for dysuria  Wakes on occasion to void  Musculoskeletal: Negative for back pain and myalgias  Skin: Negative for rash  Neurological: Negative for dizziness and headaches  Psychiatric/Behavioral: Negative for agitation and behavioral problems           Patient Active Problem List   Diagnosis    Benign essential hypertension    Hypercholesterolemia    History of gout    BMI 36 0-36 9,adult    Impaired fasting glucose    Family history of malignant neoplasm of prostate    Other hemorrhoids    Bereavement       Past Medical History:   Diagnosis Date    Blurred vision     Last assessed - 1/28/15    CAP (community acquired pneumonia)     Last assessed - 9/20/16    Hemorrhoids     Last assessed - 11/23/15    HTN (hypertension)     Hypercholesterolemia     IFG (impaired fasting glucose)     Last assessed - 11/18/14    Rotator cuff tendinitis     Last assessed - 6/17/15       Past Surgical History:   Procedure Laterality Date    APPENDECTOMY      Resolved - 1/24/09    COLONOSCOPY      WISDOM TOOTH EXTRACTION           Current Outpatient Medications:     aspirin 81 MG tablet, Take 1 tablet by mouth daily, Disp: , Rfl:     doxazosin (CARDURA) 8 MG tablet, Take 1 tablet (8 mg total) by mouth daily, Disp: 90 tablet, Rfl: 1    hydrocortisone (ANUSOL-HC, PROCTOSOL HC,) 2 5 % rectal cream, Insert into the rectum 3 (three) times a day as needed for hemorrhoids, Disp: 28 35 g, Rfl: 1    lisinopril (ZESTRIL) 30 mg tablet, Take 1 tablet (30 mg total) by mouth daily, Disp: 90 tablet, Rfl: 1    Omega-3 1000 MG CAPS, Take 1 capsule by mouth daily  , Disp: , Rfl:     Red Yeast Rice 600 MG CAPS, Take 1 capsule by mouth daily  , Disp: , Rfl:     Allergies   Allergen Reactions    Penicillins Hives    Pollen Extract        Social History     Socioeconomic History    Marital status: /Civil Union     Spouse name: None    Number of children: None    Years of education: None    Highest education level: None   Occupational History    Occupation: [employer]     Employer: [employer]   Social Needs    Financial resource strain: None    Food insecurity:     Worry: None     Inability: None    Transportation needs:     Medical: None     Non-medical: None   Tobacco Use    Smoking status: Never Smoker    Smokeless tobacco: Never Used   Substance and Sexual Activity    Alcohol use:  Yes     Alcohol/week: 1 0 standard drinks     Types: 1 Cans of beer per week     Frequency: 2-3 times a week     Drinks per session: 3 or 4     Binge frequency: Monthly     Comment: Socially    Drug use: No    Sexual activity: Yes   Lifestyle    Physical activity:     Days per week: None     Minutes per session: None    Stress: None   Relationships    Social connections:     Talks on phone: None     Gets together: None     Attends Mos[employer] service: None     Active member of club or organization: None     Attends meetings of clubs or organizations: None     Relationship status: None    Intimate partner violence:     Fear of current or ex partner: None     Emotionally abused: None     Physically abused: None     Forced sexual activity: None   Other Topics Concern    None   Social History Narrative    Travel Hx - Pt denies being out of the country during 1/2014-11/18/2014       Family History   Problem Relation Age of Onset    Hyperlipidemia Mother     Hypertension Mother     Pulmonary embolism Mother     Diabetes type II Mother     Hyperlipidemia Father     Hypertension Father     Prostate cancer Father     Diabetes type II Father        PHQ-9 Depression Screening    PHQ-9:    Frequency of the following problems over the past two weeks:              Health Maintenance   Topic Date Due    DTaP,Tdap,and Td Vaccines (1 - Tdap) 08/06/1966    HIV Screening  08/06/1970    Annual Physical  08/06/1973    CRC Screening: Colonoscopy  11/20/2019    Influenza Vaccine  07/01/2020    Depression Screening PHQ  01/31/2021    BMI: Followup Plan  01/31/2021    Pneumococcal Vaccine: 65+ Years (1 of 2 - PCV13) 08/06/2020    BMI: Adult  07/31/2021    Hepatitis C Screening  Completed    Pneumococcal Vaccine: Pediatrics (0 to 5 Years) and At-Risk Patients (6 to 59 Years)  Aged Out    HIB Vaccine  Aged Out    Hepatitis B Vaccine  Aged Out    IPV Vaccine  Aged Out    Hepatitis A Vaccine  Aged Out    Meningococcal ACWY Vaccine  Aged Out    HPV Vaccine  Aged Out Immunization History   Administered Date(s) Administered    Influenza Quadrivalent Preservative Free 3 years and older IM 11/29/2017    Influenza Quadrivalent, 6-35 Months IM 11/21/2016    Td (adult), adsorbed 09/01/2007        Objective:  Vitals:    07/31/20 1426 07/31/20 1502   BP: 138/74 118/72   BP Location: Left arm Left arm   Patient Position: Sitting Sitting   Cuff Size: Large Standard   Pulse: 90    Temp: 97 9 °F (36 6 °C)    TempSrc: Temporal    SpO2: 97%    Weight: 115 kg (254 lb)    Height: 5' 10" (1 778 m)      Wt Readings from Last 3 Encounters:   07/31/20 115 kg (254 lb)   01/31/20 117 kg (257 lb 12 8 oz)   01/06/20 115 kg (253 lb 12 8 oz)     Body mass index is 36 45 kg/m²  No exam data present       Physical Exam   Constitutional: He appears well-developed and well-nourished  No distress  Alert  Tearful  Seated in room in no acute distress   HENT:   Head: Normocephalic and atraumatic  Mouth/Throat: Oropharynx is clear and moist  No oropharyngeal exudate  Moist mucous membranes normal posterior pharynx   Eyes: Pupils are equal, round, and reactive to light  Right eye exhibits no discharge  Left eye exhibits no discharge  No scleral icterus  Neck: Neck supple  Cardiovascular: Normal rate, regular rhythm and normal heart sounds  No murmur heard  Regular rate and rhythm  No audible murmur   Pulmonary/Chest: Effort normal and breath sounds normal  No respiratory distress  He has no wheezes  He has no rales  Clear to auscultation throughout  No crackles no rhonchi no wheeze  No respiratory distress   Abdominal: Soft  Bowel sounds are normal  He exhibits no distension  There is no tenderness  There is no guarding  Obese soft nontender positive bowel sounds   Musculoskeletal: He exhibits no edema  Neurological: He is alert  Skin: Skin is warm and dry  He is not diaphoretic  Psychiatric: His behavior is normal  Thought content normal  His mood appears not anxious   His speech is not rapid and/or pressured and not slurred  He is not agitated and not hyperactive  Cognition and memory are not impaired  He exhibits a depressed mood  He expresses no homicidal and no suicidal ideation  Tearful when talking about his wife  Smiles when he talks about some of the good times a had  Nursing note and vitals reviewed  Future Appointments   Date Time Provider Phyllis Chasei   11/6/2020  2:30 PM Rasheed Murphy PA-C [address] [place] [address]    Patient Care Team:  Rasheed Murphy PA-C as PCP - General (Internal Medicine)    This note was completed in part utilizing [employer] Fluency Direct Software  Grammatical errors, random word insertions, spelling mistakes, and incomplete sentences can be an occasional consequence of this system secondary to software limitations, ambient noise, and hardware issues  If you have any questions or concerns about the content, text, or information contained within the body of this dictation, please contact the provider for clarification

## 2020-07-31 NOTE — ASSESSMENT & PLAN NOTE
Give encouragement for daily exercise and do something small for himself each day with a rate is going for a walker fishing with his grandson    Low-fat low-cholesterol diet

## 2020-07-31 NOTE — ASSESSMENT & PLAN NOTE
Patient recently lost his wife  Give my condolences  Discussed Breeze mint therapy and counseling which patient is not interested in at this time  He has a good family social support and friends who we talks too  Discussed how therapy and in some circumstances medications may be helpful  Patient verbalized understanding med is not interested in therapy or any assistance at this time  I let him know that we are always here in the event that he needs anything  Otherwise routine follow-up in 3 months

## 2020-07-31 NOTE — ASSESSMENT & PLAN NOTE
Blood pressure stable on    No change in blood pressure medications continue lisinopril and Cardura as patient is not interested in medication change in that this time

## 2020-07-31 NOTE — ASSESSMENT & PLAN NOTE
Slight elevated uric acid on last lab however patient notes he was not eating well at that time  He has since gotten back on track with his diet    Will check uric acid level and pending the results may need to resume allopurinol

## 2020-07-31 NOTE — ASSESSMENT & PLAN NOTE
Reviewed lipids  Due to ASCVD risk patient is in statin benefit group but patient is not interested in statins prefers to use fish oil and red yeast rice at this time    Will readdress at future visits

## 2020-10-24 ENCOUNTER — LAB (OUTPATIENT)
Dept: LAB | Facility: MEDICAL CENTER | Age: 65
End: 2020-10-24
Payer: COMMERCIAL

## 2020-10-24 DIAGNOSIS — I10 BENIGN ESSENTIAL HYPERTENSION: ICD-10-CM

## 2020-10-24 DIAGNOSIS — R73.01 IMPAIRED FASTING GLUCOSE: ICD-10-CM

## 2020-10-24 DIAGNOSIS — E78.00 HYPERCHOLESTEROLEMIA: ICD-10-CM

## 2020-10-24 DIAGNOSIS — Z87.39 HISTORY OF GOUT: ICD-10-CM

## 2020-10-24 LAB
ALBUMIN SERPL BCP-MCNC: 3.8 G/DL (ref 3.5–5)
ALP SERPL-CCNC: 101 U/L (ref 46–116)
ALT SERPL W P-5'-P-CCNC: 27 U/L (ref 12–78)
ANION GAP SERPL CALCULATED.3IONS-SCNC: 4 MMOL/L (ref 4–13)
AST SERPL W P-5'-P-CCNC: 14 U/L (ref 5–45)
BASOPHILS # BLD AUTO: 0.06 THOUSANDS/ΜL (ref 0–0.1)
BASOPHILS NFR BLD AUTO: 1 % (ref 0–1)
BILIRUB SERPL-MCNC: 1.19 MG/DL (ref 0.2–1)
BUN SERPL-MCNC: 15 MG/DL (ref 5–25)
CALCIUM SERPL-MCNC: 8.7 MG/DL (ref 8.3–10.1)
CHLORIDE SERPL-SCNC: 110 MMOL/L (ref 100–108)
CHOLEST SERPL-MCNC: 151 MG/DL (ref 50–200)
CO2 SERPL-SCNC: 26 MMOL/L (ref 21–32)
CREAT SERPL-MCNC: 1.1 MG/DL (ref 0.6–1.3)
EOSINOPHIL # BLD AUTO: 0.6 THOUSAND/ΜL (ref 0–0.61)
EOSINOPHIL NFR BLD AUTO: 10 % (ref 0–6)
ERYTHROCYTE [DISTWIDTH] IN BLOOD BY AUTOMATED COUNT: 12.8 % (ref 11.6–15.1)
EST. AVERAGE GLUCOSE BLD GHB EST-MCNC: 111 MG/DL
GFR SERPL CREATININE-BSD FRML MDRD: 70 ML/MIN/1.73SQ M
GLUCOSE P FAST SERPL-MCNC: 98 MG/DL (ref 65–99)
HBA1C MFR BLD: 5.5 %
HCT VFR BLD AUTO: 43.3 % (ref 36.5–49.3)
HDLC SERPL-MCNC: 46 MG/DL
HGB BLD-MCNC: 14.5 G/DL (ref 12–17)
IMM GRANULOCYTES # BLD AUTO: 0.02 THOUSAND/UL (ref 0–0.2)
IMM GRANULOCYTES NFR BLD AUTO: 0 % (ref 0–2)
LDLC SERPL CALC-MCNC: 91 MG/DL (ref 0–100)
LYMPHOCYTES # BLD AUTO: 1.58 THOUSANDS/ΜL (ref 0.6–4.47)
LYMPHOCYTES NFR BLD AUTO: 25 % (ref 14–44)
MCH RBC QN AUTO: 31 PG (ref 26.8–34.3)
MCHC RBC AUTO-ENTMCNC: 33.5 G/DL (ref 31.4–37.4)
MCV RBC AUTO: 93 FL (ref 82–98)
MONOCYTES # BLD AUTO: 0.67 THOUSAND/ΜL (ref 0.17–1.22)
MONOCYTES NFR BLD AUTO: 11 % (ref 4–12)
NEUTROPHILS # BLD AUTO: 3.38 THOUSANDS/ΜL (ref 1.85–7.62)
NEUTS SEG NFR BLD AUTO: 53 % (ref 43–75)
NRBC BLD AUTO-RTO: 0 /100 WBCS
PLATELET # BLD AUTO: 193 THOUSANDS/UL (ref 149–390)
PMV BLD AUTO: 11 FL (ref 8.9–12.7)
POTASSIUM SERPL-SCNC: 4.5 MMOL/L (ref 3.5–5.3)
PROT SERPL-MCNC: 7.4 G/DL (ref 6.4–8.2)
RBC # BLD AUTO: 4.68 MILLION/UL (ref 3.88–5.62)
SODIUM SERPL-SCNC: 140 MMOL/L (ref 136–145)
TRIGL SERPL-MCNC: 72 MG/DL
URATE SERPL-MCNC: 8.7 MG/DL (ref 4.2–8)
WBC # BLD AUTO: 6.31 THOUSAND/UL (ref 4.31–10.16)

## 2020-10-24 PROCEDURE — 80053 COMPREHEN METABOLIC PANEL: CPT

## 2020-10-24 PROCEDURE — 85025 COMPLETE CBC W/AUTO DIFF WBC: CPT

## 2020-10-24 PROCEDURE — 84550 ASSAY OF BLOOD/URIC ACID: CPT

## 2020-10-24 PROCEDURE — 80061 LIPID PANEL: CPT

## 2020-10-24 PROCEDURE — 36415 COLL VENOUS BLD VENIPUNCTURE: CPT

## 2020-10-24 PROCEDURE — 83036 HEMOGLOBIN GLYCOSYLATED A1C: CPT

## 2020-10-30 DIAGNOSIS — I10 BENIGN ESSENTIAL HYPERTENSION: ICD-10-CM

## 2020-10-30 RX ORDER — DOXAZOSIN 8 MG/1
TABLET ORAL
Qty: 90 TABLET | Refills: 1 | OUTPATIENT
Start: 2020-10-30

## 2020-11-06 ENCOUNTER — OFFICE VISIT (OUTPATIENT)
Dept: INTERNAL MEDICINE CLINIC | Facility: CLINIC | Age: 65
End: 2020-11-06
Payer: COMMERCIAL

## 2020-11-06 VITALS
BODY MASS INDEX: 35.68 KG/M2 | SYSTOLIC BLOOD PRESSURE: 142 MMHG | HEIGHT: 70 IN | HEART RATE: 85 BPM | OXYGEN SATURATION: 98 % | TEMPERATURE: 98.5 F | WEIGHT: 249.2 LBS | DIASTOLIC BLOOD PRESSURE: 82 MMHG

## 2020-11-06 DIAGNOSIS — I10 BENIGN ESSENTIAL HYPERTENSION: ICD-10-CM

## 2020-11-06 DIAGNOSIS — E79.0 ELEVATED URIC ACID IN BLOOD: Primary | ICD-10-CM

## 2020-11-06 DIAGNOSIS — R17 ELEVATED BILIRUBIN: ICD-10-CM

## 2020-11-06 DIAGNOSIS — Z63.4 BEREAVEMENT: ICD-10-CM

## 2020-11-06 DIAGNOSIS — E78.00 HYPERCHOLESTEROLEMIA: ICD-10-CM

## 2020-11-06 DIAGNOSIS — Z23 NEED FOR INFLUENZA VACCINATION: ICD-10-CM

## 2020-11-06 DIAGNOSIS — Z12.11 SCREEN FOR COLON CANCER: ICD-10-CM

## 2020-11-06 PROCEDURE — 1101F PT FALLS ASSESS-DOCD LE1/YR: CPT | Performed by: PHYSICIAN ASSISTANT

## 2020-11-06 PROCEDURE — 90471 IMMUNIZATION ADMIN: CPT

## 2020-11-06 PROCEDURE — 3725F SCREEN DEPRESSION PERFORMED: CPT | Performed by: PHYSICIAN ASSISTANT

## 2020-11-06 PROCEDURE — 1036F TOBACCO NON-USER: CPT | Performed by: PHYSICIAN ASSISTANT

## 2020-11-06 PROCEDURE — 90662 IIV NO PRSV INCREASED AG IM: CPT

## 2020-11-06 PROCEDURE — 3008F BODY MASS INDEX DOCD: CPT | Performed by: PHYSICIAN ASSISTANT

## 2020-11-06 PROCEDURE — 3077F SYST BP >= 140 MM HG: CPT | Performed by: PHYSICIAN ASSISTANT

## 2020-11-06 PROCEDURE — 3079F DIAST BP 80-89 MM HG: CPT | Performed by: PHYSICIAN ASSISTANT

## 2020-11-06 PROCEDURE — 99214 OFFICE O/P EST MOD 30 MIN: CPT | Performed by: PHYSICIAN ASSISTANT

## 2020-11-06 PROCEDURE — 3288F FALL RISK ASSESSMENT DOCD: CPT | Performed by: PHYSICIAN ASSISTANT

## 2020-11-06 RX ORDER — ALLOPURINOL 100 MG/1
100 TABLET ORAL DAILY
Qty: 30 TABLET | Refills: 4 | Status: SHIPPED | OUTPATIENT
Start: 2020-11-06 | End: 2021-01-29

## 2020-11-06 SDOH — SOCIAL STABILITY - SOCIAL INSECURITY: DISSAPEARANCE AND DEATH OF FAMILY MEMBER: Z63.4

## 2020-12-12 ENCOUNTER — LAB (OUTPATIENT)
Dept: LAB | Facility: MEDICAL CENTER | Age: 65
End: 2020-12-12
Payer: COMMERCIAL

## 2020-12-12 DIAGNOSIS — R17 ELEVATED BILIRUBIN: ICD-10-CM

## 2020-12-12 DIAGNOSIS — E79.0 ELEVATED URIC ACID IN BLOOD: ICD-10-CM

## 2020-12-12 LAB
ALBUMIN SERPL BCP-MCNC: 3.7 G/DL (ref 3.5–5)
ALP SERPL-CCNC: 108 U/L (ref 46–116)
ALT SERPL W P-5'-P-CCNC: 24 U/L (ref 12–78)
ANION GAP SERPL CALCULATED.3IONS-SCNC: 4 MMOL/L (ref 4–13)
AST SERPL W P-5'-P-CCNC: 16 U/L (ref 5–45)
BILIRUB DIRECT SERPL-MCNC: 0.2 MG/DL (ref 0–0.2)
BILIRUB SERPL-MCNC: 0.98 MG/DL (ref 0.2–1)
BUN SERPL-MCNC: 12 MG/DL (ref 5–25)
CALCIUM SERPL-MCNC: 9.7 MG/DL (ref 8.3–10.1)
CHLORIDE SERPL-SCNC: 109 MMOL/L (ref 100–108)
CO2 SERPL-SCNC: 27 MMOL/L (ref 21–32)
CREAT SERPL-MCNC: 1.2 MG/DL (ref 0.6–1.3)
GFR SERPL CREATININE-BSD FRML MDRD: 63 ML/MIN/1.73SQ M
GLUCOSE P FAST SERPL-MCNC: 92 MG/DL (ref 65–99)
POTASSIUM SERPL-SCNC: 4.4 MMOL/L (ref 3.5–5.3)
PROT SERPL-MCNC: 7.3 G/DL (ref 6.4–8.2)
SODIUM SERPL-SCNC: 140 MMOL/L (ref 136–145)
URATE SERPL-MCNC: 5.7 MG/DL (ref 4.2–8)

## 2020-12-12 PROCEDURE — 84550 ASSAY OF BLOOD/URIC ACID: CPT

## 2020-12-12 PROCEDURE — 82248 BILIRUBIN DIRECT: CPT

## 2020-12-12 PROCEDURE — 80053 COMPREHEN METABOLIC PANEL: CPT

## 2020-12-12 PROCEDURE — 36415 COLL VENOUS BLD VENIPUNCTURE: CPT

## 2020-12-18 ENCOUNTER — OFFICE VISIT (OUTPATIENT)
Dept: INTERNAL MEDICINE CLINIC | Facility: CLINIC | Age: 65
End: 2020-12-18
Payer: COMMERCIAL

## 2020-12-18 VITALS
SYSTOLIC BLOOD PRESSURE: 128 MMHG | TEMPERATURE: 97.9 F | BODY MASS INDEX: 34.65 KG/M2 | OXYGEN SATURATION: 98 % | WEIGHT: 242 LBS | DIASTOLIC BLOOD PRESSURE: 72 MMHG | HEART RATE: 90 BPM | HEIGHT: 70 IN

## 2020-12-18 DIAGNOSIS — R73.01 IMPAIRED FASTING GLUCOSE: Primary | ICD-10-CM

## 2020-12-18 DIAGNOSIS — Z87.39 HISTORY OF GOUT: ICD-10-CM

## 2020-12-18 DIAGNOSIS — I10 BENIGN ESSENTIAL HYPERTENSION: ICD-10-CM

## 2020-12-18 DIAGNOSIS — E78.00 HYPERCHOLESTEROLEMIA: ICD-10-CM

## 2020-12-18 DIAGNOSIS — E66.9 OBESITY, CLASS I, BMI 30-34.9: ICD-10-CM

## 2020-12-18 DIAGNOSIS — R17 ELEVATED BILIRUBIN: ICD-10-CM

## 2020-12-18 PROBLEM — E66.811 OBESITY, CLASS I, BMI 30-34.9: Status: ACTIVE | Noted: 2018-03-28

## 2020-12-18 PROCEDURE — 3008F BODY MASS INDEX DOCD: CPT | Performed by: PHYSICIAN ASSISTANT

## 2020-12-18 PROCEDURE — 99214 OFFICE O/P EST MOD 30 MIN: CPT | Performed by: PHYSICIAN ASSISTANT

## 2021-01-28 DIAGNOSIS — E79.0 ELEVATED URIC ACID IN BLOOD: ICD-10-CM

## 2021-01-29 RX ORDER — ALLOPURINOL 100 MG/1
TABLET ORAL
Qty: 90 TABLET | Refills: 1 | Status: SHIPPED | OUTPATIENT
Start: 2021-01-29 | End: 2021-08-19 | Stop reason: SDUPTHER

## 2021-03-10 DIAGNOSIS — Z23 ENCOUNTER FOR IMMUNIZATION: ICD-10-CM

## 2021-04-17 ENCOUNTER — LAB (OUTPATIENT)
Dept: LAB | Facility: MEDICAL CENTER | Age: 66
End: 2021-04-17
Payer: COMMERCIAL

## 2021-04-17 DIAGNOSIS — E78.00 HYPERCHOLESTEROLEMIA: ICD-10-CM

## 2021-04-17 DIAGNOSIS — Z87.39 HISTORY OF GOUT: ICD-10-CM

## 2021-04-17 DIAGNOSIS — R17 ELEVATED BILIRUBIN: ICD-10-CM

## 2021-04-17 LAB
ALBUMIN SERPL BCP-MCNC: 3.8 G/DL (ref 3.5–5)
ALP SERPL-CCNC: 91 U/L (ref 46–116)
ALT SERPL W P-5'-P-CCNC: 33 U/L (ref 12–78)
ANION GAP SERPL CALCULATED.3IONS-SCNC: 5 MMOL/L (ref 4–13)
AST SERPL W P-5'-P-CCNC: 24 U/L (ref 5–45)
BILIRUB SERPL-MCNC: 1.02 MG/DL (ref 0.2–1)
BUN SERPL-MCNC: 14 MG/DL (ref 5–25)
CALCIUM SERPL-MCNC: 9.4 MG/DL (ref 8.3–10.1)
CHLORIDE SERPL-SCNC: 106 MMOL/L (ref 100–108)
CHOLEST SERPL-MCNC: 161 MG/DL (ref 50–200)
CO2 SERPL-SCNC: 28 MMOL/L (ref 21–32)
CREAT SERPL-MCNC: 1.27 MG/DL (ref 0.6–1.3)
GFR SERPL CREATININE-BSD FRML MDRD: 59 ML/MIN/1.73SQ M
GLUCOSE P FAST SERPL-MCNC: 100 MG/DL (ref 65–99)
HDLC SERPL-MCNC: 38 MG/DL
LDLC SERPL CALC-MCNC: 103 MG/DL (ref 0–100)
POTASSIUM SERPL-SCNC: 4.7 MMOL/L (ref 3.5–5.3)
PROT SERPL-MCNC: 7.2 G/DL (ref 6.4–8.2)
SODIUM SERPL-SCNC: 139 MMOL/L (ref 136–145)
TRIGL SERPL-MCNC: 100 MG/DL
URATE SERPL-MCNC: 6.2 MG/DL (ref 4.2–8)

## 2021-04-17 PROCEDURE — 80053 COMPREHEN METABOLIC PANEL: CPT

## 2021-04-17 PROCEDURE — 84550 ASSAY OF BLOOD/URIC ACID: CPT

## 2021-04-17 PROCEDURE — 80061 LIPID PANEL: CPT

## 2021-04-17 PROCEDURE — 36415 COLL VENOUS BLD VENIPUNCTURE: CPT

## 2021-04-23 ENCOUNTER — OFFICE VISIT (OUTPATIENT)
Dept: INTERNAL MEDICINE CLINIC | Facility: CLINIC | Age: 66
End: 2021-04-23
Payer: COMMERCIAL

## 2021-04-23 VITALS
RESPIRATION RATE: 18 BRPM | SYSTOLIC BLOOD PRESSURE: 122 MMHG | OXYGEN SATURATION: 98 % | BODY MASS INDEX: 35.07 KG/M2 | TEMPERATURE: 97.5 F | HEART RATE: 66 BPM | DIASTOLIC BLOOD PRESSURE: 68 MMHG | WEIGHT: 245 LBS | HEIGHT: 70 IN

## 2021-04-23 DIAGNOSIS — E78.00 HYPERCHOLESTEROLEMIA: ICD-10-CM

## 2021-04-23 DIAGNOSIS — H57.89 IRRITATION OF RIGHT EYE: ICD-10-CM

## 2021-04-23 DIAGNOSIS — R17 SERUM TOTAL BILIRUBIN ELEVATED: ICD-10-CM

## 2021-04-23 DIAGNOSIS — Z86.010 HX OF COLONIC POLYPS: Primary | ICD-10-CM

## 2021-04-23 DIAGNOSIS — Z71.85 VACCINE COUNSELING: ICD-10-CM

## 2021-04-23 DIAGNOSIS — I10 BENIGN ESSENTIAL HYPERTENSION: ICD-10-CM

## 2021-04-23 PROBLEM — Z86.0100 HX OF COLONIC POLYPS: Status: ACTIVE | Noted: 2021-04-23

## 2021-04-23 PROBLEM — K63.5 POLYP OF COLON: Status: ACTIVE | Noted: 2021-04-23

## 2021-04-23 PROCEDURE — 3008F BODY MASS INDEX DOCD: CPT | Performed by: PHYSICIAN ASSISTANT

## 2021-04-23 PROCEDURE — 1160F RVW MEDS BY RX/DR IN RCRD: CPT | Performed by: PHYSICIAN ASSISTANT

## 2021-04-23 PROCEDURE — 3725F SCREEN DEPRESSION PERFORMED: CPT | Performed by: PHYSICIAN ASSISTANT

## 2021-04-23 PROCEDURE — 3078F DIAST BP <80 MM HG: CPT | Performed by: PHYSICIAN ASSISTANT

## 2021-04-23 PROCEDURE — 99214 OFFICE O/P EST MOD 30 MIN: CPT | Performed by: PHYSICIAN ASSISTANT

## 2021-04-23 PROCEDURE — 1036F TOBACCO NON-USER: CPT | Performed by: PHYSICIAN ASSISTANT

## 2021-04-23 PROCEDURE — 3074F SYST BP LT 130 MM HG: CPT | Performed by: PHYSICIAN ASSISTANT

## 2021-04-23 RX ORDER — LISINOPRIL 30 MG/1
30 TABLET ORAL DAILY
Qty: 90 TABLET | Refills: 1 | Status: SHIPPED | OUTPATIENT
Start: 2021-04-23 | End: 2021-06-11

## 2021-04-23 RX ORDER — DOXAZOSIN 8 MG/1
8 TABLET ORAL DAILY
Qty: 90 TABLET | Refills: 1 | Status: SHIPPED | OUTPATIENT
Start: 2021-04-23 | End: 2022-01-28 | Stop reason: SDUPTHER

## 2021-04-23 NOTE — ASSESSMENT & PLAN NOTE
Blood pressure improved at this time  Continue same medications  Continue Cardura 8 mg daily  Continue lisinopril 30 mg daily  Encouraged low-salt diet daily physical activity which also can improve blood pressure    Continue to monitor blood pressure at home and discussed blood pressure goals of less than 120 over less than 80 optimal for patient's age and risk factors

## 2021-04-23 NOTE — ASSESSMENT & PLAN NOTE
Patient recently had COVID vaccination  He is up-to-date on influenza vaccination  Discussed importance of yearly flu shot  Tetanus booster overdue which he will have to 1st check with his insurance to see if they will cover it booster shot as he does not have any active breaks in his skin  Recommend both shingles vaccination and Pneumovax  Patient verbalized understanding and is willing  With recent COVID vaccination less than 3 weeks ago recommend he wait 6-8 weeks total after COVID shot before proceeding with Pneumovax and shingles vaccination

## 2021-04-23 NOTE — ASSESSMENT & PLAN NOTE
Reviewed lipid showing worsening of cholesterol since list lipid panel  Encouraged low-fat low-cholesterol diet  Avoidance of alcohol  Lean proteins  Discussed and highly recommend statin medication due to ASCVD risk score however after discussing risks and benefits of medication patient elects to treat without statin  Discussed risks of poorly controlled cholesterol  For this reason will increase red yeast rice from 1 tablet daily to twice daily  Continue with fish oil  Will check lipid panel in 4 months

## 2021-04-23 NOTE — ASSESSMENT & PLAN NOTE
Patient did have a previous colonoscopy with colon polyp removal with colorectal surgeon  Recommended he schedule colonoscopy follow-up as he is overdue  Patient will call and schedule  Consult placed

## 2021-04-23 NOTE — PROGRESS NOTES
[facility] [place]  Standard Office Visit  Patient ID: Lona Delgado  : 1955  Age/Gender: 72 y o  male     DATE: 2021      Assessment/Plan:    Benign essential hypertension  Blood pressure improved at this time  Continue same medications  Continue Cardura 8 mg daily  Continue lisinopril 30 mg daily  Encouraged low-salt diet daily physical activity which also can improve blood pressure  Continue to monitor blood pressure at home and discussed blood pressure goals of less than 120 over less than 80 optimal for patient's age and risk factors    Hx of colonic polyps  Patient did have a previous colonoscopy with colon polyp removal with colorectal surgeon  Recommended he schedule colonoscopy follow-up as he is overdue  Patient will call and schedule  Consult placed  Hypercholesterolemia  Reviewed lipid showing worsening of cholesterol since list lipid panel  Encouraged low-fat low-cholesterol diet  Avoidance of alcohol  Lean proteins  Discussed and highly recommend statin medication due to ASCVD risk score however after discussing risks and benefits of medication patient elects to treat without statin  Discussed risks of poorly controlled cholesterol  For this reason will increase red yeast rice from 1 tablet daily to twice daily  Continue with fish oil  Will check lipid panel in 4 months  Vaccine counseling  Patient recently had COVID vaccination  He is up-to-date on influenza vaccination  Discussed importance of yearly flu shot  Tetanus booster overdue which he will have to 1st check with his insurance to see if they will cover it booster shot as he does not have any active breaks in his skin  Recommend both shingles vaccination and Pneumovax  Patient verbalized understanding and is willing    With recent COVID vaccination less than 3 weeks ago recommend he wait 6-8 weeks total after COVID shot before proceeding with Pneumovax and shingles vaccination  Diagnoses and all orders for this visit:    Hx of colonic polyps  -     Ambulatory referral to Colorectal Surgery; Future    Benign essential hypertension  -     doxazosin (CARDURA) 8 MG tablet; Take 1 tablet (8 mg total) by mouth daily  -     lisinopril (ZESTRIL) 30 mg tablet; Take 1 tablet (30 mg total) by mouth daily  -     Comprehensive metabolic panel; Future    Hypercholesterolemia  -     Comprehensive metabolic panel; Future  -     Lipid Panel with Direct LDL reflex; Future    Vaccine counseling    Serum total bilirubin elevated  Comments:  Previously had been elevated but returned to normal   Will continue to follow  Nonfasting CMP ordered prior to follow-up    Irritation of right eye  Comments:  Symptoms fully resolved  Following with eye doctor  On antibiotic drops  Continue to follow with eye doctor and keep our office informed of treatment changes    Other orders  -     Cancel: Ambulatory referral to Gastroenterology; Future          BMI Counseling: Body mass index is 35 15 kg/m²  The BMI is above normal  Nutrition recommendations include decreasing portion sizes, encouraging healthy choices of fruits and vegetables, limiting drinks that contain sugar, increasing intake of lean protein and reducing intake of cholesterol  Exercise recommendations include exercising 3-5 times per week  No pharmacotherapy was ordered  Patient referred to PCP due to patient being overweight  Depression Screening and Follow-up Plan: Patient's depression screening was positive with a PHQ-2 score of 0  Clincally patient does not have depression  No treatment is required  Falls Plan of Care: balance, strength, and gait training instructions were provided  Recommended assistive device to help with gait and balance  Medications that increase falls were reviewed  Assessed feet and footwear  Home safety education provided  Patient has not had any falls    Discussed importance of daily activity  Discussed importance of grab bars in bathroom  Discussed importance of fall prevention  Patient currently on Cardura and has been for some time  We have discussed multiple times transitioning from care Cardura to another agent however patient currently is feeling stable on these medications and is not interested in changing  Discussed importance of close monitoring and reporting back immediately if any dizziness occurs as this is a common side effect of this medication  Will continue follow         Subjective:   Chief Complaint   Patient presents with    Follow-up     4 month, labs done 4/17/21  no other issues or concerns    health maintenance     order for GI placed due to hx, bmi f/u, bmi adult, AWV fall risk due next visit         Maty Bustillos  is a 72 y o  male who presents to the office on 4/23/2021 for     71 yo male with PMHx of HTN, impaired fasting glucose, obesity for follow up for his BP  Taking BP at home 116/74 at home with most recent check  Systolic usually ranges 055-031, diastolic number 46-76 but always less than 80  At this time feels alright  No complaints or concerns at this time  He recently retired and notes he is trying to keep busy  Notes he has been cleaning up the home and stay active  Notes he is help his daughter and spend time with his grandchildren  Plans to go this summer for colonoscopy  He reports he is overdue  He notes he wanted to wait until after pandemic  Got his COVID vaccine, tolerated it well  He notes when COVID hit his supervisor put him on short term leave and then he officially retired the start of this year  Stays in touch with coworkers and family  Notes losing his wife is a long healing process but he feels he is doing better with it as time goes on  Not interested in talking to anyone at this time other than his daughter who lives next door to him  No gout flareups  Patient recently had labs drawn and is here for review  He reports that when he was cutting the grass the other day something flew up in his eye  His right eye was irritated and he went to see an eye doctor who evaluated him and told him that he scratched his eye but it is not affecting his cornea  He was put on antibiotic drops which she is completing today  His eye feels much better  No foreign body cessation  No eye pain  No decreased vision  Using drops as directed  He is taking red yeast rice once daily  He is not interested in taking statin medication due to the possible side effects this medication can cause  The following portions of the patient's history were reviewed and updated as appropriate: allergies, current medications, past family history, past medical history, past social history, past surgical history and problem list     Review of Systems   Constitutional: Negative for chills and fever  HENT: Negative for postnasal drip, rhinorrhea and sore throat  Eyes: Negative for visual disturbance  Respiratory: Negative for cough, shortness of breath and wheezing  Cardiovascular: Negative for chest pain and palpitations  Gastrointestinal: Negative for abdominal pain, constipation, diarrhea, nausea and vomiting  Bowel movements have been regular   Genitourinary: Negative for dysuria  Patient has been on Cardura for some time to help with nocturia symptoms  He does week avoid sometimes once per night  Tries to avoid fluid intake at night  Patient has a normal urinary stream   No straining to void  No pain with urination   Neurological: Negative for dizziness, syncope, weakness and light-headedness  Psychiatric/Behavioral: Negative for agitation  Patient feels he is coping with the loss of his wife fairly well  Does have family who is supportive           Patient Active Problem List   Diagnosis    Benign essential hypertension    Hypercholesterolemia    History of gout    Obesity, Class I, BMI 30-34 9    Impaired fasting glucose    Family history of malignant neoplasm of prostate    Other hemorrhoids    Bereavement    Elevated bilirubin    Elevated uric acid in blood    Screen for colon cancer    Need for influenza vaccination    Polyp of colon    Hx of colonic polyps    Vaccine counseling       Past Medical History:   Diagnosis Date    Blurred vision     Last assessed - 1/28/15    CAP (community acquired pneumonia)     Last assessed - 9/20/16    Hemorrhoids     Last assessed - 11/23/15    HTN (hypertension)     Hypercholesterolemia     IFG (impaired fasting glucose)     Last assessed - 11/18/14    Rotator cuff tendinitis     Last assessed - 6/17/15       Past Surgical History:   Procedure Laterality Date    APPENDECTOMY      Resolved - 1/24/09    COLONOSCOPY      WISDOM TOOTH EXTRACTION           Current Outpatient Medications:     allopurinol (ZYLOPRIM) 100 mg tablet, TAKE 1 TABLET BY MOUTH EVERY DAY, Disp: 90 tablet, Rfl: 1    aspirin 81 MG tablet, Take 1 tablet by mouth daily, Disp: , Rfl:     doxazosin (CARDURA) 8 MG tablet, Take 1 tablet (8 mg total) by mouth daily, Disp: 90 tablet, Rfl: 1    hydrocortisone (ANUSOL-HC, PROCTOSOL HC,) 2 5 % rectal cream, Insert into the rectum 3 (three) times a day as needed for hemorrhoids, Disp: 28 35 g, Rfl: 1    lisinopril (ZESTRIL) 30 mg tablet, Take 1 tablet (30 mg total) by mouth daily, Disp: 90 tablet, Rfl: 1    Omega-3 1000 MG CAPS, Take 1 capsule by mouth daily  , Disp: , Rfl:     Red Yeast Rice 600 MG CAPS, Take 1 capsule by mouth daily  , Disp: , Rfl:     Allergies   Allergen Reactions    Penicillins Hives    Pollen Extract        Social History     Socioeconomic History    Marital status: /Civil Union     Spouse name: None    Number of children: None    Years of education: None    Highest education level: None   Occupational History    Occupation: [employer]     Employer: [employer]   Social Needs    Financial resource strain: None    Food insecurity     Worry: None     Inability: None    Transportation needs     Medical: None     Non-medical: None   Tobacco Use    Smoking status: Never Smoker    Smokeless tobacco: Never Used   Substance and Sexual Activity    Alcohol use:  Yes     Alcohol/week: 1 0 standard drinks     Types: 1 Cans of beer per week     Frequency: 2-3 times a week     Drinks per session: 3 or 4     Binge frequency: Monthly     Comment: Socially    Drug use: No    Sexual activity: Yes   Lifestyle    Physical activity     Days per week: None     Minutes per session: None    Stress: None   Relationships    Social connections     Talks on phone: None     Gets together: None     Attends Jainism service: None     Active member of club or organization: None     Attends meetings of clubs or organizations: None     Relationship status: None    Intimate partner violence     Fear of current or ex partner: None     Emotionally abused: None     Physically abused: None     Forced sexual activity: None   Other Topics Concern    None   Social History Narrative    Travel Hx - Pt denies being out of the country during 1/2014-11/18/2014       Family History   Problem Relation Age of Onset    Hyperlipidemia Mother     Hypertension Mother     Pulmonary embolism Mother     Diabetes type II Mother     Hyperlipidemia Father     Hypertension Father     Prostate cancer Father     Diabetes type II Father        PHQ-9 Depression Screening    PHQ-9:   Frequency of the following problems over the past two weeks:      Little interest or pleasure in doing things: 0 - not at all  Feeling down, depressed, or hopeless: 0 - not at all  PHQ-2 Score: 0         Health Maintenance   Topic Date Due    Medicare Annual Wellness Visit (AWV)  Never done    HIV Screening  Never done    DTaP,Tdap,and Td Vaccines (1 - Tdap) 08/06/1976    Colonoscopy Surveillance  11/20/2019    Pneumococcal Vaccine: 65+ Years (1 of 1 - PPSV23) Never done   Bekah Mcnamara Fall Risk  11/06/2021    Depression Screening PHQ  11/06/2021    BMI: Followup Plan  12/18/2021    BMI: Adult  04/23/2022    Colorectal Cancer Screening  11/20/2024    Hepatitis C Screening  Completed    Influenza Vaccine  Completed    COVID-19 Vaccine  Completed    HIB Vaccine  Aged Out    Hepatitis B Vaccine  Aged Out    IPV Vaccine  Aged Out    Hepatitis A Vaccine  Aged Out    Meningococcal ACWY Vaccine  Aged Out    HPV Vaccine  Aged Out       Immunization History   Administered Date(s) Administered    Influenza Quadrivalent Preservative Free 3 years and older IM 11/29/2017    Influenza Quadrivalent, 6-35 Months IM 11/21/2016    Influenza, high dose seasonal 0 7 mL 11/06/2020    SARS-CoV-2 / COVID-19 mRNA IM (Pfizer-BioNTech) 03/19/2021, 04/09/2021    Td (adult), adsorbed 09/01/2007        Objective:  Vitals:    04/23/21 0945 04/23/21 1016   BP: 142/82 122/68   BP Location: Left arm Left arm   Patient Position: Sitting    Cuff Size: Large Extra-Large   Pulse: 66    Resp: 18    Temp: 97 5 °F (36 4 °C)    TempSrc: Tympanic    SpO2: 98%    Weight: 111 kg (245 lb)    Height: 5' 10" (1 778 m)      Wt Readings from Last 3 Encounters:   04/23/21 111 kg (245 lb)   12/18/20 110 kg (242 lb)   11/06/20 113 kg (249 lb 3 2 oz)     Body mass index is 35 15 kg/m²  No exam data present       Physical Exam  Vitals signs and nursing note reviewed  Constitutional:       General: He is not in acute distress  Appearance: Normal appearance  He is well-developed  He is not ill-appearing, toxic-appearing or diaphoretic  Comments: Alert pleasant cooperative  Seated in room in no acute distress   HENT:      Head: Normocephalic and atraumatic  Right Ear: Tympanic membrane normal       Left Ear: Tympanic membrane normal       Mouth/Throat:      Mouth: Mucous membranes are moist       Pharynx: No oropharyngeal exudate or posterior oropharyngeal erythema  Eyes:      General: No scleral icterus  Right eye: No discharge  Left eye: No discharge  Extraocular Movements:      Right eye: Normal extraocular motion  Left eye: Normal extraocular motion  Conjunctiva/sclera:      Right eye: Right conjunctiva is injected  No exudate or hemorrhage  Left eye: Left conjunctiva is not injected  No exudate or hemorrhage  Pupils: Pupils are equal, round, and reactive to light  Neck:      Musculoskeletal: Neck supple  Cardiovascular:      Rate and Rhythm: Normal rate and regular rhythm  Heart sounds: Normal heart sounds  No murmur  Pulmonary:      Effort: Pulmonary effort is normal  No respiratory distress  Breath sounds: Normal breath sounds  No wheezing or rales  Abdominal:      General: Bowel sounds are normal  There is no distension  Palpations: Abdomen is soft  Tenderness: There is no abdominal tenderness  There is no guarding  Lymphadenopathy:      Cervical: No cervical adenopathy  Skin:     General: Skin is warm and dry  Neurological:      General: No focal deficit present  Mental Status: He is alert  Psychiatric:         Mood and Affect: Mood normal          Behavior: Behavior normal          Thought Content: Thought content normal              Future Appointments   Date Time Provider Phyllis Chasei   8/27/2021  8:00 AM Malen Severe, PA-C [address] [place] [address]    Patient Care Team:  Malen Severe, PA-C as PCP - General (Internal Medicine)  Jo Abraham MD as PCP - [address] [place] [address] (RTE)    This note was completed in part utilizing [employer] Direct Software  Grammatical errors, random word insertions, spelling mistakes, and incomplete sentences can be an occasional consequence of this system secondary to software limitations, ambient noise, and hardware issues    If you have any questions or concerns about the content, text, or information contained within the body of this dictation, please contact the provider for clarification

## 2021-04-23 NOTE — PATIENT INSTRUCTIONS
Benign essential hypertension  Blood pressure improved at this time  Continue same medications  Continue Cardura 8 mg daily  Continue lisinopril 30 mg daily  Encouraged low-salt diet daily physical activity which also can improve blood pressure  Continue to monitor blood pressure at home and discussed blood pressure goals of less than 120 over less than 80 optimal for patient's age and risk factors    Hx of colonic polyps  Patient did have a previous colonoscopy with colon polyp removal with colorectal surgeon  Recommended he schedule colonoscopy follow-up as he is overdue  Patient will call and schedule  Consult placed  Hypercholesterolemia  Reviewed lipid showing worsening of cholesterol since list lipid panel  Encouraged low-fat low-cholesterol diet  Avoidance of alcohol  Lean proteins  Discussed and highly recommend statin medication due to ASCVD risk score however after discussing risks and benefits of medication patient elects to treat without statin  Discussed risks of poorly controlled cholesterol  For this reason will increase red yeast rice from 1 tablet daily to twice daily  Continue with fish oil  Will check lipid panel in 4 months  Vaccine counseling  Patient recently had COVID vaccination  He is up-to-date on influenza vaccination  Discussed importance of yearly flu shot  Tetanus booster overdue which he will have to 1st check with his insurance to see if they will cover it booster shot as he does not have any active breaks in his skin  Recommend both shingles vaccination and Pneumovax  Patient verbalized understanding and is willing  With recent COVID vaccination less than 3 weeks ago recommend he wait 6-8 weeks total after COVID shot before proceeding with Pneumovax and shingles vaccination

## 2021-05-24 DIAGNOSIS — I10 BENIGN ESSENTIAL HYPERTENSION: ICD-10-CM

## 2021-05-25 NOTE — ASSESSMENT & PLAN NOTE
Blood pressure slightly elevated today  Currently taking lisinopril 30 mg daily and Cardura 8 mg daily  Discussed adjustment to patient's lisinopril dose which she is not interested in at this time  Without any current symptoms  Advised him to continue to check his blood pressure at home  Discussed blood pressure goals of less than 130 over less than 80  Keep blood pressure log to bring to our follow-up  Weight loss and daily activity can also improved blood pressure readings    Follow-up in 2 months to discuss labs and blood pressure log Concentration Of Solution Injected (Mg/Ml): 10.0

## 2021-05-26 RX ORDER — DOXAZOSIN 8 MG/1
TABLET ORAL
Qty: 90 TABLET | Refills: 1 | OUTPATIENT
Start: 2021-05-26

## 2021-06-06 DIAGNOSIS — I10 BENIGN ESSENTIAL HYPERTENSION: ICD-10-CM

## 2021-06-11 RX ORDER — LISINOPRIL 30 MG/1
TABLET ORAL
Qty: 90 TABLET | Refills: 1 | Status: SHIPPED | OUTPATIENT
Start: 2021-06-11 | End: 2021-12-21 | Stop reason: SDUPTHER

## 2021-07-29 PROCEDURE — 88305 TISSUE EXAM BY PATHOLOGIST: CPT | Performed by: PATHOLOGY

## 2021-07-30 ENCOUNTER — LAB REQUISITION (OUTPATIENT)
Dept: LAB | Facility: HOSPITAL | Age: 66
End: 2021-07-30
Payer: COMMERCIAL

## 2021-07-30 DIAGNOSIS — Z86.010 PERSONAL HISTORY OF COLONIC POLYPS: ICD-10-CM

## 2021-07-30 DIAGNOSIS — K63.5 POLYP OF COLON: ICD-10-CM

## 2021-07-30 DIAGNOSIS — K57.30 DIVERTICULOSIS OF LARGE INTESTINE WITHOUT PERFORATION OR ABSCESS WITHOUT BLEEDING: ICD-10-CM

## 2021-08-19 DIAGNOSIS — E79.0 ELEVATED URIC ACID IN BLOOD: ICD-10-CM

## 2021-08-20 ENCOUNTER — APPOINTMENT (OUTPATIENT)
Dept: LAB | Facility: MEDICAL CENTER | Age: 66
End: 2021-08-20
Payer: COMMERCIAL

## 2021-08-20 DIAGNOSIS — E78.00 HYPERCHOLESTEROLEMIA: ICD-10-CM

## 2021-08-20 DIAGNOSIS — I10 BENIGN ESSENTIAL HYPERTENSION: ICD-10-CM

## 2021-08-20 LAB
ALBUMIN SERPL BCP-MCNC: 3.4 G/DL (ref 3.5–5)
ALP SERPL-CCNC: 104 U/L (ref 46–116)
ALT SERPL W P-5'-P-CCNC: 27 U/L (ref 12–78)
ANION GAP SERPL CALCULATED.3IONS-SCNC: 3 MMOL/L (ref 4–13)
AST SERPL W P-5'-P-CCNC: 18 U/L (ref 5–45)
BILIRUB SERPL-MCNC: 0.69 MG/DL (ref 0.2–1)
BUN SERPL-MCNC: 18 MG/DL (ref 5–25)
CALCIUM ALBUM COR SERPL-MCNC: 9.2 MG/DL (ref 8.3–10.1)
CALCIUM SERPL-MCNC: 8.7 MG/DL (ref 8.3–10.1)
CHLORIDE SERPL-SCNC: 109 MMOL/L (ref 100–108)
CHOLEST SERPL-MCNC: 150 MG/DL (ref 50–200)
CO2 SERPL-SCNC: 26 MMOL/L (ref 21–32)
CREAT SERPL-MCNC: 1.27 MG/DL (ref 0.6–1.3)
GFR SERPL CREATININE-BSD FRML MDRD: 58 ML/MIN/1.73SQ M
GLUCOSE P FAST SERPL-MCNC: 94 MG/DL (ref 65–99)
HDLC SERPL-MCNC: 41 MG/DL
LDLC SERPL CALC-MCNC: 86 MG/DL (ref 0–100)
POTASSIUM SERPL-SCNC: 4.8 MMOL/L (ref 3.5–5.3)
PROT SERPL-MCNC: 7.4 G/DL (ref 6.4–8.2)
SODIUM SERPL-SCNC: 138 MMOL/L (ref 136–145)
TRIGL SERPL-MCNC: 114 MG/DL

## 2021-08-20 PROCEDURE — 80053 COMPREHEN METABOLIC PANEL: CPT

## 2021-08-20 PROCEDURE — 80061 LIPID PANEL: CPT

## 2021-08-20 PROCEDURE — 36415 COLL VENOUS BLD VENIPUNCTURE: CPT

## 2021-08-20 RX ORDER — ALLOPURINOL 100 MG/1
100 TABLET ORAL DAILY
Qty: 90 TABLET | Refills: 1 | Status: SHIPPED | OUTPATIENT
Start: 2021-08-20 | End: 2022-01-28 | Stop reason: SDUPTHER

## 2021-08-27 ENCOUNTER — OFFICE VISIT (OUTPATIENT)
Dept: INTERNAL MEDICINE CLINIC | Facility: CLINIC | Age: 66
End: 2021-08-27
Payer: COMMERCIAL

## 2021-08-27 VITALS
WEIGHT: 243.4 LBS | TEMPERATURE: 98.2 F | HEIGHT: 70 IN | BODY MASS INDEX: 34.84 KG/M2 | RESPIRATION RATE: 20 BRPM | DIASTOLIC BLOOD PRESSURE: 72 MMHG | HEART RATE: 74 BPM | SYSTOLIC BLOOD PRESSURE: 124 MMHG | OXYGEN SATURATION: 98 %

## 2021-08-27 DIAGNOSIS — Z87.39 HISTORY OF GOUT: ICD-10-CM

## 2021-08-27 DIAGNOSIS — I10 BENIGN ESSENTIAL HYPERTENSION: ICD-10-CM

## 2021-08-27 DIAGNOSIS — E78.00 HYPERCHOLESTEROLEMIA: ICD-10-CM

## 2021-08-27 DIAGNOSIS — K63.5 POLYP OF COLON, UNSPECIFIED PART OF COLON, UNSPECIFIED TYPE: ICD-10-CM

## 2021-08-27 DIAGNOSIS — Z00.00 WELCOME TO MEDICARE PREVENTIVE VISIT: Primary | ICD-10-CM

## 2021-08-27 DIAGNOSIS — Z12.5 SCREENING FOR PROSTATE CANCER: ICD-10-CM

## 2021-08-27 DIAGNOSIS — Z23 NEED FOR PNEUMOCOCCAL VACCINATION: ICD-10-CM

## 2021-08-27 PROCEDURE — 99214 OFFICE O/P EST MOD 30 MIN: CPT | Performed by: NURSE PRACTITIONER

## 2021-08-27 PROCEDURE — 1036F TOBACCO NON-USER: CPT | Performed by: NURSE PRACTITIONER

## 2021-08-27 PROCEDURE — 90670 PCV13 VACCINE IM: CPT

## 2021-08-27 PROCEDURE — 3288F FALL RISK ASSESSMENT DOCD: CPT | Performed by: NURSE PRACTITIONER

## 2021-08-27 PROCEDURE — G0402 INITIAL PREVENTIVE EXAM: HCPCS | Performed by: NURSE PRACTITIONER

## 2021-08-27 PROCEDURE — 3008F BODY MASS INDEX DOCD: CPT | Performed by: NURSE PRACTITIONER

## 2021-08-27 PROCEDURE — 3074F SYST BP LT 130 MM HG: CPT | Performed by: NURSE PRACTITIONER

## 2021-08-27 PROCEDURE — G0009 ADMIN PNEUMOCOCCAL VACCINE: HCPCS

## 2021-08-27 PROCEDURE — 3725F SCREEN DEPRESSION PERFORMED: CPT | Performed by: NURSE PRACTITIONER

## 2021-08-27 PROCEDURE — 4040F PNEUMOC VAC/ADMIN/RCVD: CPT | Performed by: NURSE PRACTITIONER

## 2021-08-27 PROCEDURE — 1160F RVW MEDS BY RX/DR IN RCRD: CPT | Performed by: NURSE PRACTITIONER

## 2021-08-27 PROCEDURE — G0403 EKG FOR INITIAL PREVENT EXAM: HCPCS | Performed by: NURSE PRACTITIONER

## 2021-08-27 PROCEDURE — 1170F FXNL STATUS ASSESSED: CPT | Performed by: NURSE PRACTITIONER

## 2021-08-27 PROCEDURE — 1125F AMNT PAIN NOTED PAIN PRSNT: CPT | Performed by: NURSE PRACTITIONER

## 2021-08-27 PROCEDURE — 3078F DIAST BP <80 MM HG: CPT | Performed by: NURSE PRACTITIONER

## 2021-08-27 NOTE — PROGRESS NOTES
Assessment and Plan:     Problem List Items Addressed This Visit        Cardiovascular and Mediastinum    Benign essential hypertension    Relevant Orders    Comprehensive metabolic panel       Other    Hypercholesterolemia    Relevant Orders    Lipid Panel with Direct LDL reflex    Comprehensive metabolic panel    History of gout    Relevant Orders    Uric acid    Welcome to Medicare preventive visit - Primary     Recommend Prevnar 15 - given today  Recommend Shingrix vaccine at pharmacy  Due for Tdap - will get at follow up     Five wishes given and reviewed with patient  Will check PSA with next set of labs     Otherwise up to date on preventative care    EKG normal sinus rhythm with sinus arrhythmia         Relevant Orders    POCT ECG (Completed)      Other Visit Diagnoses     Screening for prostate cancer        Relevant Orders    PSA, Total Screen    Need for pneumococcal vaccination        Relevant Orders    PNEUMOCOCCAL CONJUGATE VACCINE 13-VALENT GREATER THAN 6 MONTHS (Completed)           Preventive health issues were discussed with patient, and age appropriate screening tests were ordered as noted in patient's After Visit Summary  Personalized health advice and appropriate referrals for health education or preventive services given if needed, as noted in patient's After Visit Summary  History of Present Illness:     Patient presents for Welcome to Medicare visit  Patient Care Team:  Mingo John PA-C as PCP - General (Internal Medicine)  Cate Ty MD as PCP - [address] [place] [address] ([facility]E)  [facility]     Review of Systems:     Review of Systems   Constitutional: Negative for activity change, appetite change, chills, diaphoresis, fatigue, fever and unexpected weight change  Eyes: Negative for visual disturbance  Respiratory: Negative for cough, chest tightness, shortness of breath and wheezing      Cardiovascular: Negative for chest pain, palpitations and leg swelling  Gastrointestinal: Negative for abdominal distention, abdominal pain, blood in stool, constipation, diarrhea, nausea and vomiting  Genitourinary: Negative for difficulty urinating  Musculoskeletal: Negative for arthralgias, joint swelling and myalgias  Neurological: Negative for dizziness, light-headedness and headaches        Problem List:     Patient Active Problem List   Diagnosis    Benign essential hypertension    Hypercholesterolemia    History of gout    Obesity, Class I, BMI 30-34 9    Impaired fasting glucose    Family history of malignant neoplasm of prostate    Other hemorrhoids    Bereavement    Elevated bilirubin    Elevated uric acid in blood    Screen for colon cancer    Need for influenza vaccination    Polyp of colon    Hx of colonic polyps    Vaccine counseling    Welcome to Medicare preventive visit      Past Medical and Surgical History:     Past Medical History:   Diagnosis Date    Blurred vision     Last assessed - 1/28/15    CAP (community acquired pneumonia)     Last assessed - 9/20/16    Hemorrhoids     Last assessed - 11/23/15    HTN (hypertension)     Hypercholesterolemia     IFG (impaired fasting glucose)     Last assessed - 11/18/14    Rotator cuff tendinitis     Last assessed - 6/17/15     Past Surgical History:   Procedure Laterality Date    APPENDECTOMY      Resolved - 1/24/09    COLONOSCOPY      WISDOM TOOTH EXTRACTION        Family History:     Family History   Problem Relation Age of Onset    Hyperlipidemia Mother     Hypertension Mother     Pulmonary embolism Mother     Diabetes type II Mother     Hyperlipidemia Father     Hypertension Father     Prostate cancer Father     Diabetes type II Father       Social History:     Social History     Socioeconomic History    Marital status: /Civil Union     Spouse name: None    Number of children: None    Years of education: None    Highest education level: None   Occupational History    Occupation: Nutritics     Employer: [employer]   Tobacco Use    Smoking status: Never Smoker    Smokeless tobacco: Never Used   Vaping Use    Vaping Use: Never used   Substance and Sexual Activity    Alcohol use: Yes     Alcohol/week: 1 0 standard drinks     Types: 1 Cans of beer per week     Comment: 4 weekly    Drug use: No    Sexual activity: Yes   Other Topics Concern    None   Social History Narrative    Travel Hx - Pt denies being out of the country during 1/2014-11/18/2014     Social Determinants of Health     Financial Resource Strain:     Difficulty of Paying Living Expenses:    Food Insecurity:     Worried About Running Out of Food in the Last Year:     Ran Out of Food in the Last Year:    Transportation Needs:     Lack of Transportation (Medical):      Lack of Transportation (Non-Medical):    Physical Activity:     Days of Exercise per Week:     Minutes of Exercise per Session:    Stress:     Feeling of Stress :    Social Connections:     Frequency of Communication with Friends and Family:     Frequency of Social Gatherings with Friends and Family:     Attends [employer] Services:     Active Member of Clubs or Organizations:     Attends Club or Organization Meetings:     Marital Status:    Intimate Partner Violence:     Fear of Current or Ex-Partner:     Emotionally Abused:     Physically Abused:     Sexually Abused:       Medications and Allergies:     Current Outpatient Medications   Medication Sig Dispense Refill    allopurinol (ZYLOPRIM) 100 mg tablet Take 1 tablet (100 mg total) by mouth daily 90 tablet 1    aspirin 81 MG tablet Take 1 tablet by mouth daily      doxazosin (CARDURA) 8 MG tablet Take 1 tablet (8 mg total) by mouth daily 90 tablet 1    hydrocortisone (ANUSOL-HC, PROCTOSOL HC,) 2 5 % rectal cream Insert into the rectum 3 (three) times a day as needed for hemorrhoids 28 35 g 1    lisinopril (ZESTRIL) 30 mg tablet TAKE 1 TABLET BY MOUTH EVERY DAY 90 tablet 1    Omega-3 1000 MG CAPS Take 1 capsule by mouth daily        Red Yeast Rice 600 MG CAPS Take 2 capsules by mouth daily        No current facility-administered medications for this visit  Allergies   Allergen Reactions    Penicillins Hives    Pollen Extract       Immunizations:     Immunization History   Administered Date(s) Administered    Influenza Quadrivalent Preservative Free 3 years and older IM 11/29/2017    Influenza Quadrivalent, 6-35 Months IM 11/21/2016    Influenza, high dose seasonal 0 7 mL 11/06/2020    Pneumococcal Conjugate 13-Valent 08/27/2021    SARS-CoV-2 / COVID-19 mRNA IM (Pfizer-BioNTAll At Home) 03/19/2021, 04/09/2021    Td (adult), adsorbed 09/01/2007      Health Maintenance:         Topic Date Due    Colorectal Cancer Screening  07/29/2024    Hepatitis C Screening  Completed         Topic Date Due    DTaP,Tdap,and Td Vaccines (1 - Tdap) 08/06/1976    Influenza Vaccine (1) 09/01/2021      Medicare Screening Tests and Risk Assessments:     Sandra Darby is here for his Welcome to Medicare visit  Health Risk Assessment:   Patient rates overall health as very good  Patient feels that their physical health rating is same  Patient is very satisfied with their life  Eyesight was rated as same  Hearing was rated as same  Patient feels that their emotional and mental health rating is same  Patients states they are never, rarely angry  Patient states they are never, rarely unusually tired/fatigued  Pain experienced in the last 7 days has been none  Patient states that he has experienced no weight loss or gain in last 6 months  Depression Screening:   PHQ-2 Score: 0      Fall Risk Screening: In the past year, patient has experienced: no history of falling in past year      Home Safety:  Patient does not have trouble with stairs inside or outside of their home  Patient has working smoke alarms and has working carbon monoxide detector   Home safety hazards include: none      Nutrition:   Current diet is Regular  Medications:   Patient is currently taking over-the-counter supplements  OTC medications include: see medication list  Patient is able to manage medications  Activities of Daily Living (ADLs)/Instrumental Activities of Daily Living (IADLs):   Walk and transfer into and out of bed and chair?: Yes  Dress and groom yourself?: Yes    Bathe or shower yourself?: Yes    Feed yourself? Yes  Do your laundry/housekeeping?: Yes  Manage your money, pay your bills and track your expenses?: Yes  Make your own meals?: Yes    Do your own shopping?: Yes    Previous Hospitalizations:   Any hospitalizations or ED visits within the last 12 months?: No      Advance Care Planning:   Living will: No    Advanced directive: No    Five wishes given: Yes    End of Life Decisions reviewed with patient: Yes    Provider agrees with end of life decisions: Yes      PREVENTIVE SCREENINGS      Cardiovascular Screening:    General: Screening Not Indicated and History Lipid Disorder      Diabetes Screening:     General: Screening Current      Colorectal Cancer Screening:     General: Screening Current      Prostate Cancer Screening:      Due for: PSA      Osteoporosis Screening:    General: Screening Not Indicated      Abdominal Aortic Aneurysm (AAA) Screening:    Risk factors include: age between 73-69 yo        General: Screening Not Indicated      Lung Cancer Screening:     General: Screening Not Indicated      Hepatitis C Screening:    General: Screening Current    Screening, Brief Intervention, and Referral to Treatment (SBIRT)    Screening  Typical number of drinks in a day: 0  Typical number of drinks in a week: 4  Interpretation: Low risk drinking behavior      Single Item Drug Screening:  How often have you used an illegal drug (including marijuana) or a prescription medication for non-medical reasons in the past year? never    Single Item Drug Screen Score: 0  Interpretation: Negative screen for possible drug use disorder     Visual Acuity Screening    Right eye Left eye Both eyes   Without correction: 20/25 20/25 20/20   With correction:           Physical Exam:     /72 (BP Location: Left arm, Patient Position: Sitting, Cuff Size: Large)   Pulse 74   Temp 98 2 °F (36 8 °C) (Temporal)   Resp 20   Ht 5' 10" (1 778 m)   Wt 110 kg (243 lb 6 4 oz)   SpO2 98%   BMI 34 92 kg/m²     Physical Exam  Vitals reviewed  Exam conducted with a chaperone present  Constitutional:       General: He is not in acute distress  Appearance: Normal appearance  He is not diaphoretic  HENT:      Head: Normocephalic and atraumatic  Right Ear: Tympanic membrane and external ear normal       Left Ear: Tympanic membrane and external ear normal       Nose: Nose normal  No congestion or rhinorrhea  Mouth/Throat:      Mouth: Mucous membranes are moist       Pharynx: Oropharynx is clear  No oropharyngeal exudate or posterior oropharyngeal erythema  Eyes:      Extraocular Movements: Extraocular movements intact  Conjunctiva/sclera: Conjunctivae normal       Pupils: Pupils are equal, round, and reactive to light  Neck:      Vascular: No carotid bruit  Cardiovascular:      Rate and Rhythm: Normal rate and regular rhythm  Heart sounds: Normal heart sounds  Pulmonary:      Effort: Pulmonary effort is normal  No respiratory distress  Breath sounds: Normal breath sounds  No wheezing, rhonchi or rales  Abdominal:      General: Bowel sounds are normal  There is no distension  Palpations: Abdomen is soft  There is no mass  Tenderness: There is no abdominal tenderness  There is no guarding  Genitourinary:     Penis: Normal        Testes: Normal       Prostate: Normal    Musculoskeletal:         General: No tenderness  Normal range of motion  Cervical back: Neck supple  Right lower leg: No edema  Left lower leg: No edema     Skin:     General: Skin is warm and dry    Neurological:      Mental Status: He is alert and oriented to person, place, and time  Mental status is at baseline  Motor: No weakness  Gait: Gait normal    Psychiatric:         Mood and Affect: Mood normal          Behavior: Behavior normal          Thought Content:  Thought content normal          Judgment: Judgment normal           SUDHEER Gramajo

## 2021-08-27 NOTE — PATIENT INSTRUCTIONS
Medicare Preventive Visit Patient Instructions  Thank you for completing your Welcome to Medicare Visit or Medicare Annual Wellness Visit today  Your next wellness visit will be due in one year (8/28/2022)  The screening/preventive services that you may require over the next 5-10 years are detailed below  Some tests may not apply to you based off risk factors and/or age  Screening tests ordered at today's visit but not completed yet may show as past due  Also, please note that scanned in results may not display below  Preventive Screenings:  Service Recommendations Previous Testing/Comments   Colorectal Cancer Screening  · Colonoscopy    · Fecal Occult Blood Test (FOBT)/Fecal Immunochemical Test (FIT)  · Fecal DNA/Cologuard Test  · Flexible Sigmoidoscopy Age: 54-65 years old   Colonoscopy: every 10 years (May be performed more frequently if at higher risk)  OR  FOBT/FIT: every 1 year  OR  Cologuard: every 3 years  OR  Sigmoidoscopy: every 5 years  Screening may be recommended earlier than age 48 if at higher risk for colorectal cancer  Also, an individualized decision between you and your healthcare provider will decide whether screening between the ages of 74-80 would be appropriate   Colonoscopy: 07/29/2021  FOBT/FIT: Not on file  Cologuard: Not on file  Sigmoidoscopy: Not on file    Screening Current     Prostate Cancer Screening Individualized decision between patient and health care provider in men between ages of 53-78   Medicare will cover every 12 months beginning on the day after your 50th birthday PSA: 0 3 ng/mL           Hepatitis C Screening Once for adults born between 1945 and 1965  More frequently in patients at high risk for Hepatitis C Hep C Antibody: 09/26/2018    Screening Current   Diabetes Screening 1-2 times per year if you're at risk for diabetes or have pre-diabetes Fasting glucose: 94 mg/dL   A1C: 5 5 %    Screening Current   Cholesterol Screening Once every 5 years if you don't have a lipid disorder  May order more often based on risk factors  Lipid panel: 08/20/2021    Screening Not Indicated  History Lipid Disorder      Other Preventive Screenings Covered by Medicare:  1  Abdominal Aortic Aneurysm (AAA) Screening: covered once if your at risk  You're considered to be at risk if you have a family history of AAA or a male between the age of 73-68 who smoking at least 100 cigarettes in your lifetime  2  Lung Cancer Screening: covers low dose CT scan once per year if you meet all of the following conditions: (1) Age 50-69; (2) No signs or symptoms of lung cancer; (3) Current smoker or have quit smoking within the last 15 years; (4) You have a tobacco smoking history of at least 30 pack years (packs per day x number of years you smoked); (5) You get a written order from a healthcare provider  3  Glaucoma Screening: covered annually if you're considered high risk: (1) You have diabetes OR (2) Family history of glaucoma OR (3)  aged 48 and older OR (3)  American aged 72 and older  3  Osteoporosis Screening: covered every 2 years if you meet one of the following conditions: (1) Have a vertebral abnormality; (2) On glucocorticoid therapy for more than 3 months; (3) Have primary hyperparathyroidism; (4) On osteoporosis medications and need to assess response to drug therapy  5  HIV Screening: covered annually if you're between the age of 12-76  Also covered annually if you are younger than 13 and older than 72 with risk factors for HIV infection  For pregnant patients, it is covered up to 3 times per pregnancy      Immunizations:  Immunization Recommendations   Influenza Vaccine Annual influenza vaccination during flu season is recommended for all persons aged >= 6 months who do not have contraindications   Pneumococcal Vaccine (Prevnar and Pneumovax)  * Prevnar = PCV13  * Pneumovax = PPSV23 Adults 25-60 years old: 1-3 doses may be recommended based on certain risk factors  Adults 72 years old: Prevnar (PCV13) vaccine recommended followed by Pneumovax (PPSV23) vaccine  If already received PPSV23 since turning 65, then PCV13 recommended at least one year after PPSV23 dose  Hepatitis B Vaccine 3 dose series if at intermediate or high risk (ex: diabetes, end stage renal disease, liver disease)   Tetanus (Td) Vaccine - COST NOT COVERED BY MEDICARE PART B Following completion of primary series, a booster dose should be given every 10 years to maintain immunity against tetanus  Td may also be given as tetanus wound prophylaxis  Tdap Vaccine - COST NOT COVERED BY MEDICARE PART B Recommended at least once for all adults  For pregnant patients, recommended with each pregnancy  Shingles Vaccine (Shingrix) - COST NOT COVERED BY MEDICARE PART B  2 shot series recommended in those aged 48 and above     Health Maintenance Due:      Topic Date Due    Colorectal Cancer Screening  07/29/2024    Hepatitis C Screening  Completed     Immunizations Due:      Topic Date Due    DTaP,Tdap,and Td Vaccines (1 - Tdap) 08/06/1976    Pneumococcal Vaccine: 65+ Years (1 of 1 - PPSV23) Never done    Influenza Vaccine (1) 09/01/2021     Advance Directives   What are advance directives? Advance directives are legal documents that state your wishes and plans for medical care  These plans are made ahead of time in case you lose your ability to make decisions for yourself  Advance directives can apply to any medical decision, such as the treatments you want, and if you want to donate organs  What are the types of advance directives? There are many types of advance directives, and each state has rules about how to use them  You may choose a combination of any of the following:  · Living will: This is a written record of the treatment you want  You can also choose which treatments you do not want, which to limit, and which to stop at a certain time   This includes surgery, medicine, IV fluid, and tube feedings  · Durable power of  for healthcare [facility]): This is a written record that states who you want to make healthcare choices for you when you are unable to make them for yourself  This person, called a proxy, is usually a family member or a friend  You may choose more than 1 proxy  · Do not resuscitate (DNR) order:  A DNR order is used in case your heart stops beating or you stop breathing  It is a request not to have certain forms of treatment, such as CPR  A DNR order may be included in other types of advance directives  · Medical directive: This covers the care that you want if you are in a coma, near death, or unable to make decisions for yourself  You can list the treatments you want for each condition  Treatment may include pain medicine, surgery, blood transfusions, dialysis, IV or tube feedings, and a ventilator (breathing machine)  · Values history: This document has questions about your views, beliefs, and how you feel and think about life  This information can help others choose the care that you would choose  Why are advance directives important? An advance directive helps you control your care  Although spoken wishes may be used, it is better to have your wishes written down  Spoken wishes can be misunderstood, or not followed  Treatments may be given even if you do not want them  An advance directive may make it easier for your family to make difficult choices about your care  Weight Management   Why it is important to manage your weight:  Being overweight increases your risk of health conditions such as heart disease, high blood pressure, type 2 diabetes, and certain types of cancer  It can also increase your risk for osteoarthritis, sleep apnea, and other respiratory problems  Aim for a slow, steady weight loss  Even a small amount of weight loss can lower your risk of health problems    How to lose weight safely:  A safe and healthy way to lose weight is to eat fewer calories and get regular exercise  You can lose up about 1 pound a week by decreasing the number of calories you eat by 500 calories each day  Healthy meal plan for weight management:  A healthy meal plan includes a variety of foods, contains fewer calories, and helps you stay healthy  A healthy meal plan includes the following:  · Eat whole-grain foods more often  A healthy meal plan should contain fiber  Fiber is the part of grains, fruits, and vegetables that is not broken down by your body  Whole-grain foods are healthy and provide extra fiber in your diet  Some examples of whole-grain foods are whole-wheat breads and pastas, oatmeal, brown rice, and bulgur  · Eat a variety of vegetables every day  Include dark, leafy greens such as spinach, kale, patrica greens, and mustard greens  Eat yellow and orange vegetables such as carrots, sweet potatoes, and winter squash  · Eat a variety of fruits every day  Choose fresh or canned fruit (canned in its own juice or light syrup) instead of juice  Fruit juice has very little or no fiber  · Eat low-fat dairy foods  Drink fat-free (skim) milk or 1% milk  Eat fat-free yogurt and low-fat cottage cheese  Try low-fat cheeses such as mozzarella and other reduced-fat cheeses  · Choose meat and other protein foods that are low in fat  Choose beans or other legumes such as split peas or lentils  Choose fish, skinless poultry (chicken or turkey), or lean cuts of red meat (beef or pork)  Before you cook meat or poultry, cut off any visible fat  · Use less fat and oil  Try baking foods instead of frying them  Add less fat, such as margarine, sour cream, regular salad dressing and mayonnaise to foods  Eat fewer high-fat foods  Some examples of high-fat foods include french fries, doughnuts, ice cream, and cakes  · Eat fewer sweets  Limit foods and drinks that are high in sugar  This includes candy, cookies, regular soda, and sweetened drinks    Exercise:  Exercise at least 30 minutes per day on most days of the week  Some examples of exercise include walking, biking, dancing, and swimming  You can also fit in more physical activity by taking the stairs instead of the elevator or parking farther away from stores  Ask your healthcare provider about the best exercise plan for you  © Copyright [employer] 2018 Information is for End User's use only and may not be sold, redistributed or otherwise used for commercial purposes  All illustrations and images included in CareNotes® are the copyrighted property of [employer]  or [facility] Preventive Visit Patient Instructions  Thank you for completing your Welcome to Medicare Visit or Medicare Annual Wellness Visit today  Your next wellness visit will be due in one year (8/28/2022)  The screening/preventive services that you may require over the next 5-10 years are detailed below  Some tests may not apply to you based off risk factors and/or age  Screening tests ordered at today's visit but not completed yet may show as past due  Also, please note that scanned in results may not display below  Preventive Screenings:  Service Recommendations Previous Testing/Comments   Colorectal Cancer Screening  · Colonoscopy    · Fecal Occult Blood Test (FOBT)/Fecal Immunochemical Test (FIT)  · Fecal DNA/Cologuard Test  · Flexible Sigmoidoscopy Age: 54-65 years old   Colonoscopy: every 10 years (May be performed more frequently if at higher risk)  OR  FOBT/FIT: every 1 year  OR  Cologuard: every 3 years  OR  Sigmoidoscopy: every 5 years  Screening may be recommended earlier than age 48 if at higher risk for colorectal cancer  Also, an individualized decision between you and your healthcare provider will decide whether screening between the ages of 74-80 would be appropriate   Colonoscopy: 07/29/2021  FOBT/FIT: Not on file  Cologuard: Not on file  Sigmoidoscopy: Not on file    Screening Current     Prostate Cancer Screening Individualized decision between patient and health care provider in men between ages of 53-78   Medicare will cover every 12 months beginning on the day after your 50th birthday PSA: 0 3 ng/mL           Hepatitis C Screening Once for adults born between 1945 and 1965  More frequently in patients at high risk for Hepatitis C Hep C Antibody: 09/26/2018    Screening Current   Diabetes Screening 1-2 times per year if you're at risk for diabetes or have pre-diabetes Fasting glucose: 94 mg/dL   A1C: 5 5 %    Screening Current   Cholesterol Screening Once every 5 years if you don't have a lipid disorder  May order more often based on risk factors  Lipid panel: 08/20/2021    Screening Not Indicated  History Lipid Disorder      Other Preventive Screenings Covered by Medicare:  6  Abdominal Aortic Aneurysm (AAA) Screening: covered once if your at risk  You're considered to be at risk if you have a family history of AAA or a male between the age of 73-68 who smoking at least 100 cigarettes in your lifetime  7  Lung Cancer Screening: covers low dose CT scan once per year if you meet all of the following conditions: (1) Age 50-69; (2) No signs or symptoms of lung cancer; (3) Current smoker or have quit smoking within the last 15 years; (4) You have a tobacco smoking history of at least 30 pack years (packs per day x number of years you smoked); (5) You get a written order from a healthcare provider  8  Glaucoma Screening: covered annually if you're considered high risk: (1) You have diabetes OR (2) Family history of glaucoma OR (3)  aged 48 and older OR (3)  American aged 72 and older  5  Osteoporosis Screening: covered every 2 years if you meet one of the following conditions: (1) Have a vertebral abnormality; (2) On glucocorticoid therapy for more than 3 months; (3) Have primary hyperparathyroidism; (4) On osteoporosis medications and need to assess response to drug therapy    10  HIV Screening: covered annually if you're between the age of 15-65  Also covered annually if you are younger than 13 and older than 72 with risk factors for HIV infection  For pregnant patients, it is covered up to 3 times per pregnancy  Immunizations:  Immunization Recommendations   Influenza Vaccine Annual influenza vaccination during flu season is recommended for all persons aged >= 6 months who do not have contraindications   Pneumococcal Vaccine (Prevnar and Pneumovax)  * Prevnar = PCV13  * Pneumovax = PPSV23 Adults 25-60 years old: 1-3 doses may be recommended based on certain risk factors  Adults 72 years old: Prevnar (PCV13) vaccine recommended followed by Pneumovax (PPSV23) vaccine  If already received PPSV23 since turning 65, then PCV13 recommended at least one year after PPSV23 dose  Hepatitis B Vaccine 3 dose series if at intermediate or high risk (ex: diabetes, end stage renal disease, liver disease)   Tetanus (Td) Vaccine - COST NOT COVERED BY MEDICARE PART B Following completion of primary series, a booster dose should be given every 10 years to maintain immunity against tetanus  Td may also be given as tetanus wound prophylaxis  Tdap Vaccine - COST NOT COVERED BY MEDICARE PART B Recommended at least once for all adults  For pregnant patients, recommended with each pregnancy  Shingles Vaccine (Shingrix) - COST NOT COVERED BY MEDICARE PART B  2 shot series recommended in those aged 48 and above     Health Maintenance Due:      Topic Date Due    Colorectal Cancer Screening  07/29/2024    Hepatitis C Screening  Completed     Immunizations Due:      Topic Date Due    DTaP,Tdap,and Td Vaccines (1 - Tdap) 08/06/1976    Pneumococcal Vaccine: 65+ Years (1 of 1 - PPSV23) Never done    Influenza Vaccine (1) 09/01/2021     Advance Directives   What are advance directives? Advance directives are legal documents that state your wishes and plans for medical care   These plans are made ahead of time in case you lose your ability to make decisions for yourself  Advance directives can apply to any medical decision, such as the treatments you want, and if you want to donate organs  What are the types of advance directives? There are many types of advance directives, and each state has rules about how to use them  You may choose a combination of any of the following:  · Living will: This is a written record of the treatment you want  You can also choose which treatments you do not want, which to limit, and which to stop at a certain time  This includes surgery, medicine, IV fluid, and tube feedings  · Durable power of  for healthcare [facility]): This is a written record that states who you want to make healthcare choices for you when you are unable to make them for yourself  This person, called a proxy, is usually a family member or a friend  You may choose more than 1 proxy  · Do not resuscitate (DNR) order:  A DNR order is used in case your heart stops beating or you stop breathing  It is a request not to have certain forms of treatment, such as CPR  A DNR order may be included in other types of advance directives  · Medical directive: This covers the care that you want if you are in a coma, near death, or unable to make decisions for yourself  You can list the treatments you want for each condition  Treatment may include pain medicine, surgery, blood transfusions, dialysis, IV or tube feedings, and a ventilator (breathing machine)  · Values history: This document has questions about your views, beliefs, and how you feel and think about life  This information can help others choose the care that you would choose  Why are advance directives important? An advance directive helps you control your care  Although spoken wishes may be used, it is better to have your wishes written down  Spoken wishes can be misunderstood, or not followed  Treatments may be given even if you do not want them   An advance directive may make it easier for your family to make difficult choices about your care  Weight Management   Why it is important to manage your weight:  Being overweight increases your risk of health conditions such as heart disease, high blood pressure, type 2 diabetes, and certain types of cancer  It can also increase your risk for osteoarthritis, sleep apnea, and other respiratory problems  Aim for a slow, steady weight loss  Even a small amount of weight loss can lower your risk of health problems  How to lose weight safely:  A safe and healthy way to lose weight is to eat fewer calories and get regular exercise  You can lose up about 1 pound a week by decreasing the number of calories you eat by 500 calories each day  Healthy meal plan for weight management:  A healthy meal plan includes a variety of foods, contains fewer calories, and helps you stay healthy  A healthy meal plan includes the following:  · Eat whole-grain foods more often  A healthy meal plan should contain fiber  Fiber is the part of grains, fruits, and vegetables that is not broken down by your body  Whole-grain foods are healthy and provide extra fiber in your diet  Some examples of whole-grain foods are whole-wheat breads and pastas, oatmeal, brown rice, and bulgur  · Eat a variety of vegetables every day  Include dark, leafy greens such as spinach, kale, patrica greens, and mustard greens  Eat yellow and orange vegetables such as carrots, sweet potatoes, and winter squash  · Eat a variety of fruits every day  Choose fresh or canned fruit (canned in its own juice or light syrup) instead of juice  Fruit juice has very little or no fiber  · Eat low-fat dairy foods  Drink fat-free (skim) milk or 1% milk  Eat fat-free yogurt and low-fat cottage cheese  Try low-fat cheeses such as mozzarella and other reduced-fat cheeses  · Choose meat and other protein foods that are low in fat  Choose beans or other legumes such as split peas or lentils   Choose fish, skinless poultry (chicken or turkey), or lean cuts of red meat (beef or pork)  Before you cook meat or poultry, cut off any visible fat  · Use less fat and oil  Try baking foods instead of frying them  Add less fat, such as margarine, sour cream, regular salad dressing and mayonnaise to foods  Eat fewer high-fat foods  Some examples of high-fat foods include french fries, doughnuts, ice cream, and cakes  · Eat fewer sweets  Limit foods and drinks that are high in sugar  This includes candy, cookies, regular soda, and sweetened drinks  Exercise:  Exercise at least 30 minutes per day on most days of the week  Some examples of exercise include walking, biking, dancing, and swimming  You can also fit in more physical activity by taking the stairs instead of the elevator or parking farther away from stores  Ask your healthcare provider about the best exercise plan for you  © Copyright [employer] 2018 Information is for End User's use only and may not be sold, redistributed or otherwise used for commercial purposes   All illustrations and images included in CareNotes® are the copyrighted property of [employer]  or [address] [place] [address]

## 2021-08-27 NOTE — PROGRESS NOTES
Assessment/Plan:    Problem List Items Addressed This Visit        Digestive    Polyp of colon     Colonoscopy 7/29/21 - 5 polyps removed, benign  Repeat in 3 years            Cardiovascular and Mediastinum    Benign essential hypertension     Controlled on lisinopril 30mg and doxazosin 8mg daily         Relevant Orders    Comprehensive metabolic panel       Other    Hypercholesterolemia     Improving with increase to red yeast rice  Currently on 1200mg daily  Discussed ASCVD risk of 13 9%  Prefers to remain off statin   Repeat 4 months          Relevant Orders    Lipid Panel with Direct LDL reflex    Comprehensive metabolic panel    History of gout     No attacks while on allopurinol 100mg daily          Relevant Orders    Uric acid    Welcome to Medicare preventive visit - Primary     Recommend Prevnar 13 - given today  Recommend Shingrix vaccine at pharmacy  Due for Tdap - will get at follow up     Five wishes given and reviewed with patient  Will check PSA with next set of labs     Otherwise up to date on preventative care    EKG normal sinus rhythm with sinus arrhythmia         Relevant Orders    POCT ECG (Completed)      Other Visit Diagnoses     Screening for prostate cancer        Relevant Orders    PSA, Total Screen    Need for pneumococcal vaccination        Relevant Orders    PNEUMOCOCCAL CONJUGATE VACCINE 13-VALENT GREATER THAN 6 MONTHS (Completed)          BMI Counseling: Body mass index is 34 92 kg/m²  Discussed the patient's BMI with her  The BMI is above normal  Nutrition recommendations include reducing portion sizes, decreasing overall calorie intake, 3-5 servings of fruits/vegetables daily, moderation in carbohydrate intake and increasing intake of lean protein  Exercise recommendations include moderate aerobic physical activity for 150 minutes/week  [employer] software was used to dictate this note  It may contain errors with dictating incorrect words or incorrect spelling   Please contact the provider directly with any questions  Subjective:      Patient ID: Jacoby Santamaria  is a 77 y o  male  HPI     Patient presents for 4 month follow up and to review labs from 8/20  HTN - currently well controlled on lisinopril 30 and the doxazosin 8mg daily  He is monitoring BP at home and is well controlled  HLD - currently on red yeast rice 1200mg daily  Recently increased from 600mg 4 months ago  Total cholesterol 150, trigs 114, HDL 41, LDL 86 - improving with increase to red yeast rice     The 10-year ASCVD risk score (Yaritza Stanley et al , 2013) is: 13 9%    Values used to calculate the score:      Age: 77 years      Sex: Male      Is Non- : No      Diabetic: No      Tobacco smoker: No      Systolic Blood Pressure: 016 mmHg      Is BP treated: Yes      HDL Cholesterol: 41 mg/dL      Total Cholesterol: 150 mg/dL     Colonoscopy 7/29 - 5 polyps removed, benign  Repeat in 3 years    Gout - currently on allopurinol 100mg daily  Uric acid has been normal  No recent flares  The following portions of the patient's history were reviewed and updated as appropriate: allergies, current medications, past family history, past medical history, past social history, past surgical history and problem list     Review of Systems   Constitutional: Negative for chills and fever  Eyes: Negative for visual disturbance  Respiratory: Negative for cough, chest tightness, shortness of breath and wheezing  Cardiovascular: Negative for chest pain and palpitations  Musculoskeletal: Negative for arthralgias and myalgias  Neurological: Negative for headaches           Past Medical History:   Diagnosis Date    Blurred vision     Last assessed - 1/28/15    CAP (community acquired pneumonia)     Last assessed - 9/20/16    Hemorrhoids     Last assessed - 11/23/15    HTN (hypertension)     Hypercholesterolemia     IFG (impaired fasting glucose)     Last assessed - 11/18/14    Rotator cuff tendinitis     Last assessed - 6/17/15         Current Outpatient Medications:     allopurinol (ZYLOPRIM) 100 mg tablet, Take 1 tablet (100 mg total) by mouth daily, Disp: 90 tablet, Rfl: 1    aspirin 81 MG tablet, Take 1 tablet by mouth daily, Disp: , Rfl:     doxazosin (CARDURA) 8 MG tablet, Take 1 tablet (8 mg total) by mouth daily, Disp: 90 tablet, Rfl: 1    hydrocortisone (ANUSOL-HC, PROCTOSOL HC,) 2 5 % rectal cream, Insert into the rectum 3 (three) times a day as needed for hemorrhoids, Disp: 28 35 g, Rfl: 1    lisinopril (ZESTRIL) 30 mg tablet, TAKE 1 TABLET BY MOUTH EVERY DAY, Disp: 90 tablet, Rfl: 1    Omega-3 1000 MG CAPS, Take 1 capsule by mouth daily  , Disp: , Rfl:     Red Yeast Rice 600 MG CAPS, Take 2 capsules by mouth daily , Disp: , Rfl:     Allergies   Allergen Reactions    Penicillins Hives    Pollen Extract        Social History   Past Surgical History:   Procedure Laterality Date    APPENDECTOMY      Resolved - 1/24/09    COLONOSCOPY      WISDOM TOOTH EXTRACTION       Family History   Problem Relation Age of Onset    Hyperlipidemia Mother     Hypertension Mother     Pulmonary embolism Mother     Diabetes type II Mother     Hyperlipidemia Father     Hypertension Father     Prostate cancer Father     Diabetes type II Father        Objective:  /72 (BP Location: Left arm, Patient Position: Sitting, Cuff Size: Large)   Pulse 74   Temp 98 2 °F (36 8 °C) (Temporal)   Resp 20   Ht 5' 10" (1 778 m)   Wt 110 kg (243 lb 6 4 oz)   SpO2 98%   BMI 34 92 kg/m²      Physical Exam  Vitals reviewed  Constitutional:       General: He is not in acute distress  Appearance: Normal appearance  He is well-developed  He is not diaphoretic  HENT:      Head: Normocephalic and atraumatic        Right Ear: Tympanic membrane and external ear normal       Left Ear: Tympanic membrane and external ear normal       Nose: Nose normal       Mouth/Throat:      Pharynx: No oropharyngeal exudate or posterior oropharyngeal erythema  Eyes:      Conjunctiva/sclera: Conjunctivae normal       Pupils: Pupils are equal, round, and reactive to light  Cardiovascular:      Rate and Rhythm: Normal rate and regular rhythm  Heart sounds: Normal heart sounds  No murmur heard  Pulmonary:      Effort: Pulmonary effort is normal  No respiratory distress  Breath sounds: Normal breath sounds  No decreased breath sounds, wheezing or rhonchi  Musculoskeletal:      Cervical back: Normal range of motion and neck supple  Lymphadenopathy:      Cervical: No cervical adenopathy  Skin:     General: Skin is warm and dry  Neurological:      Mental Status: He is alert and oriented to person, place, and time     Psychiatric:         Behavior: Behavior normal

## 2021-08-27 NOTE — ASSESSMENT & PLAN NOTE
Recommend Prevnar 15 - given today  Recommend Shingrix vaccine at pharmacy  Due for Tdap - will get at follow up     Five wishes given and reviewed with patient  Will check PSA with next set of labs     Otherwise up to date on preventative care    EKG normal sinus rhythm with sinus arrhythmia

## 2021-08-27 NOTE — ASSESSMENT & PLAN NOTE
Improving with increase to red yeast rice  Currently on 1200mg daily  Discussed ASCVD risk of 13 9%  Prefers to remain off statin   Repeat 4 months

## 2021-12-21 DIAGNOSIS — I10 BENIGN ESSENTIAL HYPERTENSION: ICD-10-CM

## 2021-12-21 RX ORDER — LISINOPRIL 30 MG/1
30 TABLET ORAL DAILY
Qty: 90 TABLET | Refills: 1 | Status: SHIPPED | OUTPATIENT
Start: 2021-12-21 | End: 2022-01-28 | Stop reason: SDUPTHER

## 2021-12-29 ENCOUNTER — APPOINTMENT (OUTPATIENT)
Dept: LAB | Facility: MEDICAL CENTER | Age: 66
End: 2021-12-29
Payer: COMMERCIAL

## 2021-12-29 DIAGNOSIS — E78.00 HYPERCHOLESTEROLEMIA: ICD-10-CM

## 2021-12-29 DIAGNOSIS — Z87.39 HISTORY OF GOUT: ICD-10-CM

## 2021-12-29 DIAGNOSIS — I10 BENIGN ESSENTIAL HYPERTENSION: ICD-10-CM

## 2021-12-29 DIAGNOSIS — Z12.5 SCREENING FOR PROSTATE CANCER: ICD-10-CM

## 2021-12-29 LAB
ALBUMIN SERPL BCP-MCNC: 3.8 G/DL (ref 3.5–5)
ALP SERPL-CCNC: 109 U/L (ref 46–116)
ALT SERPL W P-5'-P-CCNC: 32 U/L (ref 12–78)
ANION GAP SERPL CALCULATED.3IONS-SCNC: 3 MMOL/L (ref 4–13)
AST SERPL W P-5'-P-CCNC: 18 U/L (ref 5–45)
BILIRUB SERPL-MCNC: 1.34 MG/DL (ref 0.2–1)
BUN SERPL-MCNC: 18 MG/DL (ref 5–25)
CALCIUM SERPL-MCNC: 9 MG/DL (ref 8.3–10.1)
CHLORIDE SERPL-SCNC: 110 MMOL/L (ref 100–108)
CHOLEST SERPL-MCNC: 174 MG/DL
CO2 SERPL-SCNC: 28 MMOL/L (ref 21–32)
CREAT SERPL-MCNC: 1.26 MG/DL (ref 0.6–1.3)
GFR SERPL CREATININE-BSD FRML MDRD: 59 ML/MIN/1.73SQ M
GLUCOSE P FAST SERPL-MCNC: 96 MG/DL (ref 65–99)
HDLC SERPL-MCNC: 38 MG/DL
LDLC SERPL CALC-MCNC: 109 MG/DL (ref 0–100)
POTASSIUM SERPL-SCNC: 4.2 MMOL/L (ref 3.5–5.3)
PROT SERPL-MCNC: 7.6 G/DL (ref 6.4–8.2)
PSA SERPL-MCNC: 0.5 NG/ML (ref 0–4)
SODIUM SERPL-SCNC: 141 MMOL/L (ref 136–145)
TRIGL SERPL-MCNC: 136 MG/DL
URATE SERPL-MCNC: 6.7 MG/DL (ref 4.2–8)

## 2021-12-29 PROCEDURE — 80053 COMPREHEN METABOLIC PANEL: CPT

## 2021-12-29 PROCEDURE — G0103 PSA SCREENING: HCPCS

## 2021-12-29 PROCEDURE — 80061 LIPID PANEL: CPT

## 2021-12-29 PROCEDURE — 84550 ASSAY OF BLOOD/URIC ACID: CPT

## 2021-12-29 PROCEDURE — 36415 COLL VENOUS BLD VENIPUNCTURE: CPT

## 2022-01-28 ENCOUNTER — OFFICE VISIT (OUTPATIENT)
Dept: INTERNAL MEDICINE CLINIC | Facility: OTHER | Age: 67
End: 2022-01-28
Payer: COMMERCIAL

## 2022-01-28 VITALS
HEIGHT: 70 IN | WEIGHT: 243 LBS | OXYGEN SATURATION: 99 % | HEART RATE: 90 BPM | BODY MASS INDEX: 34.79 KG/M2 | DIASTOLIC BLOOD PRESSURE: 76 MMHG | SYSTOLIC BLOOD PRESSURE: 136 MMHG | TEMPERATURE: 98.2 F

## 2022-01-28 DIAGNOSIS — I10 BENIGN ESSENTIAL HYPERTENSION: ICD-10-CM

## 2022-01-28 DIAGNOSIS — E78.00 HYPERCHOLESTEROLEMIA: ICD-10-CM

## 2022-01-28 DIAGNOSIS — E79.0 ELEVATED URIC ACID IN BLOOD: ICD-10-CM

## 2022-01-28 DIAGNOSIS — N18.31 STAGE 3A CHRONIC KIDNEY DISEASE (HCC): ICD-10-CM

## 2022-01-28 DIAGNOSIS — R73.01 IMPAIRED FASTING GLUCOSE: Primary | ICD-10-CM

## 2022-01-28 DIAGNOSIS — R17 ELEVATED BILIRUBIN: ICD-10-CM

## 2022-01-28 DIAGNOSIS — Z71.85 VACCINE COUNSELING: ICD-10-CM

## 2022-01-28 PROCEDURE — G0008 ADMIN INFLUENZA VIRUS VAC: HCPCS

## 2022-01-28 PROCEDURE — 90662 IIV NO PRSV INCREASED AG IM: CPT

## 2022-01-28 PROCEDURE — 99214 OFFICE O/P EST MOD 30 MIN: CPT | Performed by: PHYSICIAN ASSISTANT

## 2022-01-28 RX ORDER — LISINOPRIL 30 MG/1
30 TABLET ORAL DAILY
Qty: 90 TABLET | Refills: 1 | Status: SHIPPED | OUTPATIENT
Start: 2022-01-28

## 2022-01-28 RX ORDER — DOXAZOSIN 8 MG/1
8 TABLET ORAL DAILY
Qty: 90 TABLET | Refills: 1 | Status: SHIPPED | OUTPATIENT
Start: 2022-01-28

## 2022-01-28 RX ORDER — ALLOPURINOL 100 MG/1
200 TABLET ORAL DAILY
Qty: 90 TABLET | Refills: 1 | Status: SHIPPED | OUTPATIENT
Start: 2022-01-28 | End: 2022-05-16 | Stop reason: SDUPTHER

## 2022-01-28 NOTE — ASSESSMENT & PLAN NOTE
Discussed risks and benefits of influenza vaccination  Flu shot given today  Recommend COVID booster    Patient will consider but is not interested at this time

## 2022-01-28 NOTE — ASSESSMENT & PLAN NOTE
ASCVD score calculated at 18 6%  Discussed risks of elevated cholesterol and benefits of improved cholesterol control  Patient currently has been taking red yeast rice and in the past was not interested in statin  Discussed other test that can help to risk stratify him better than ASCVD risk score  Discussed coronary calcium scoring to help us further evaluate for the presence of coronary artery calcium which could influence his ultimate decision to proceed with statin  At this time schedule coronary artery calcium score  Encouraged low-fat low-cholesterol diet  Continue red yeast rice    Will further discuss statin after results of coronary artery calcium scoring obtained

## 2022-01-28 NOTE — ASSESSMENT & PLAN NOTE
Lab Results   Component Value Date    EGFR 59 12/29/2021    EGFR 58 08/20/2021    EGFR 59 04/17/2021    CREATININE 1 26 12/29/2021    CREATININE 1 27 08/20/2021    CREATININE 1 27 04/17/2021       Renal fxn relatively stable  Will continue to follow  Tolerating ACE  Will check UA with fasting CMP  Pending results will consider KUB U/S no prior nephrology workup

## 2022-01-28 NOTE — ASSESSMENT & PLAN NOTE
Slightly elevated from prior but likely secondary to fasting state  Will repeat CMP in 6 weeks nonfasting    Hydrate well prior to lab

## 2022-01-28 NOTE — PATIENT INSTRUCTIONS
Impaired fasting glucose  BS 96 on most recent labs  Continue low carb diet, low-cholesterol  Encouraged weight loss  Benign essential hypertension  Blood pressure improved today on repeat  Encouraged low-salt diet and daily physical activity  Discussed exercises that he can do at home while the weather is not optimal   Exercise bike  Walking on a treadmill  Walking at a store or mall  Elevated uric acid in blood  Reviewed uric acid level  Slightly increased from prior and be on treatment goal   Increase allopurinol to 200 mg by mouth daily  Encouraged hydration  Low purine diet  Will follow-up uric acid with nonfasting CMP in 6 weeks    Elevated bilirubin  Slightly elevated from prior but likely secondary to fasting state  Will repeat CMP in 6 weeks nonfasting  Hydrate well prior to lab    Vaccine counseling  Discussed risks and benefits of influenza vaccination  Flu shot given today  Recommend COVID booster  Patient will consider but is not interested at this time    Hypercholesterolemia  ASCVD score calculated at 18 6%  Discussed risks of elevated cholesterol and benefits of improved cholesterol control  Patient currently has been taking red yeast rice and in the past was not interested in statin  Discussed other test that can help to risk stratify him better than ASCVD risk score  Discussed coronary calcium scoring to help us further evaluate for the presence of coronary artery calcium which could influence his ultimate decision to proceed with statin  At this time schedule coronary artery calcium score  Encouraged low-fat low-cholesterol diet  Continue red yeast rice    Will further discuss statin after results of coronary artery calcium scoring obtained    Stage 3a chronic kidney disease [facility])  Lab Results   Component Value Date    EGFR 59 12/29/2021    EGFR 58 08/20/2021    EGFR 59 04/17/2021    CREATININE 1 26 12/29/2021    CREATININE 1 27 08/20/2021    CREATININE 1 27 04/17/2021 Renal fxn relatively stable  Will continue to follow  Tolerating ACE  Will check UA with fasting CMP  Pending results will consider KUB U/S no prior nephrology workup

## 2022-01-28 NOTE — ASSESSMENT & PLAN NOTE
Blood pressure improved today on repeat  Encouraged low-salt diet and daily physical activity  Discussed exercises that he can do at home while the weather is not optimal   Exercise bike  Walking on a treadmill  Walking at a store or mall

## 2022-01-28 NOTE — ASSESSMENT & PLAN NOTE
Reviewed uric acid level  Slightly increased from prior and be on treatment goal   Increase allopurinol to 200 mg by mouth daily  Encouraged hydration  Low purine diet    Will follow-up uric acid with nonfasting CMP in 6 weeks

## 2022-01-28 NOTE — PROGRESS NOTES
[facility] [place]  Standard [place]  Patient ID: Ninfa Rodriguez  : 1955  Age/Gender: 77 y o  male     DATE: 2022      Assessment/Plan:    Impaired fasting glucose  BS 96 on most recent labs  Continue low carb diet, low-cholesterol  Encouraged weight loss  Benign essential hypertension  Blood pressure improved today on repeat  Encouraged low-salt diet and daily physical activity  Discussed exercises that he can do at home while the weather is not optimal   Exercise bike  Walking on a treadmill  Walking at a store or mall  Elevated uric acid in blood  Reviewed uric acid level  Slightly increased from prior and be on treatment goal   Increase allopurinol to 200 mg by mouth daily  Encouraged hydration  Low purine diet  Will follow-up uric acid with nonfasting CMP in 6 weeks    Elevated bilirubin  Slightly elevated from prior but likely secondary to fasting state  Will repeat CMP in 6 weeks nonfasting  Hydrate well prior to lab    Vaccine counseling  Discussed risks and benefits of influenza vaccination  Flu shot given today  Recommend COVID booster  Patient will consider but is not interested at this time    Hypercholesterolemia  ASCVD score calculated at 18 6%  Discussed risks of elevated cholesterol and benefits of improved cholesterol control  Patient currently has been taking red yeast rice and in the past was not interested in statin  Discussed other test that can help to risk stratify him better than ASCVD risk score  Discussed coronary calcium scoring to help us further evaluate for the presence of coronary artery calcium which could influence his ultimate decision to proceed with statin  At this time schedule coronary artery calcium score  Encouraged low-fat low-cholesterol diet  Continue red yeast rice    Will further discuss statin after results of coronary artery calcium scoring obtained    Stage 3a chronic kidney disease [facility])  Lab Results   Component Value Date    EGFR 59 12/29/2021    EGFR 58 08/20/2021    EGFR 59 04/17/2021    CREATININE 1 26 12/29/2021    CREATININE 1 27 08/20/2021    CREATININE 1 27 04/17/2021       Renal fxn relatively stable  Will continue to follow  Tolerating ACE  Will check UA with fasting CMP  Pending results will consider KUB U/S no prior nephrology workup  Diagnoses and all orders for this visit:    Impaired fasting glucose    Elevated uric acid in blood  -     allopurinol (ZYLOPRIM) 100 mg tablet; Take 2 tablets (200 mg total) by mouth daily  -     Uric acid; Future    Benign essential hypertension  -     lisinopril (ZESTRIL) 30 mg tablet; Take 1 tablet (30 mg total) by mouth daily  -     doxazosin (CARDURA) 8 MG tablet; Take 1 tablet (8 mg total) by mouth daily    Elevated bilirubin  -     Comprehensive metabolic panel; Future    Vaccine counseling  -     influenza vaccine, high-dose, PF 0 7 mL (FLUZONE HIGH-DOSE)    Hypercholesterolemia  -     Comprehensive metabolic panel; Future  -     CT coronary calcium score; Future    Stage 3a chronic kidney disease (HCC)  -     UA (URINE) with reflex to Scope          BMI Counseling: Body mass index is 34 87 kg/m²  The BMI is above normal  Nutrition recommendations include encouraging healthy choices of fruits and vegetables, increasing intake of lean protein, reducing intake of saturated and trans fat and reducing intake of cholesterol  Exercise recommendations include exercising 3-5 times per week  No pharmacotherapy was ordered  Patient referred to PCP  Rationale for BMI follow-up plan is due to patient being overweight or obese  Subjective:   Chief Complaint   Patient presents with    Follow-up     4 month follow up   Last blood work done on 12/29/2021    Hypertension    HM     Due for flu and tdap vaccine         Branden Brady  is a 77 y o  male who presents to the office on 1/28/2022 for     78 yo male for follow up chronic conditions  He followed his BP at home in the past   Notes he stopped doing it because he has been feeling "pretty good " Notes he has not had any recent illness  Got original COVID series last year  Not yet boosted  Notes he did get flu shot today  Taking all meds as directed  Somewhat skeptical of COVID booster and wants to discuss it further  Notes that he tried increasing his red yeast rice but did not like how it made him feel  Resolved when he went down to once daily  NO GI sx  Feeling better  Not interested in statin at this time  Would like to try to be on less medications if possible  Home life and family life going well  Does babysitting and enjoys going hunting, fishing and trapping with his grandson  No formal exercise due to cold and ice  Thinking of getting an exercise bike at home  Prior did this before and worked well for weight loss  Notes his diet could use some improvement  Trying to work to follow a generally healthy diet (not avoiding any one specific thing)  Had recent labs and is here to review  Colonoscopy 7/2021 with Dr Chema Nicholson and notes he needs 3 yr follow up  No complaints or concerns at this time  No recent gout flare-ups    Hypertension  This is a chronic problem  The problem has been waxing and waning since onset  Pertinent negatives include no blurred vision (has readers), chest pain, headaches, malaise/fatigue, palpitations, peripheral edema or shortness of breath  Past treatments include ACE inhibitors and alpha 1 blockers  There are no compliance problems  There is no history of chronic renal disease  Hyperlipidemia  This is a chronic problem  The problem is uncontrolled  Exacerbating diseases include obesity  He has no history of chronic renal disease, diabetes or hypothyroidism  Pertinent negatives include no chest pain or shortness of breath  Current antihyperlipidemic treatment includes diet change (red yeast rice)         The following portions of the patient's history were reviewed and updated as appropriate: allergies, current medications, past family history, past medical history, past social history, past surgical history and problem list     Review of Systems   Constitutional: Negative for fever and malaise/fatigue  HENT: Negative for rhinorrhea and sore throat  Eyes: Negative for blurred vision (has readers) and visual disturbance  Respiratory: Negative for cough, shortness of breath and wheezing  Cardiovascular: Negative for chest pain and palpitations  Gastrointestinal: Negative for abdominal pain, constipation, diarrhea, nausea and vomiting  Genitourinary: Negative for dysuria, frequency and penile discharge  Skin: Negative for rash  Neurological: Negative for dizziness, syncope, weakness, light-headedness and headaches  Psychiatric/Behavioral: Negative for agitation           Patient Active Problem List   Diagnosis    Benign essential hypertension    Hypercholesterolemia    History of gout    Obesity, Class I, BMI 30-34 9    Impaired fasting glucose    Family history of malignant neoplasm of prostate    Other hemorrhoids    Bereavement    Elevated bilirubin    Elevated uric acid in blood    Screen for colon cancer    Need for influenza vaccination    Polyp of colon    Hx of colonic polyps    Vaccine counseling    Welcome to Medicare preventive visit    Stage 3a chronic kidney disease ([facility] [address] )       Past Medical History:   Diagnosis Date    Blurred vision     Last assessed - 1/28/15    CAP (community acquired pneumonia)     Last assessed - 9/20/16    Hemorrhoids     Last assessed - 11/23/15    HTN (hypertension)     Hypercholesterolemia     IFG (impaired fasting glucose)     Last assessed - 11/18/14    Rotator cuff tendinitis     Last assessed - 6/17/15       Past Surgical History:   Procedure Laterality Date    APPENDECTOMY      Resolved - 1/24/09    COLONOSCOPY      2021    SU TOOTH EXTRACTION           Current Outpatient Medications:     allopurinol (ZYLOPRIM) 100 mg tablet, Take 2 tablets (200 mg total) by mouth daily, Disp: 90 tablet, Rfl: 1    aspirin 81 MG tablet, Take 1 tablet by mouth daily, Disp: , Rfl:     doxazosin (CARDURA) 8 MG tablet, Take 1 tablet (8 mg total) by mouth daily, Disp: 90 tablet, Rfl: 1    hydrocortisone (ANUSOL-HC, PROCTOSOL HC,) 2 5 % rectal cream, Insert into the rectum 3 (three) times a day as needed for hemorrhoids, Disp: 28 35 g, Rfl: 1    lisinopril (ZESTRIL) 30 mg tablet, Take 1 tablet (30 mg total) by mouth daily, Disp: 90 tablet, Rfl: 1    Omega-3 1000 MG CAPS, Take 1 capsule by mouth daily  , Disp: , Rfl:     Red Yeast Rice 600 MG CAPS, Take 2 capsules by mouth daily , Disp: , Rfl:     Allergies   Allergen Reactions    Penicillins Hives    Pollen Extract        Social History     Socioeconomic History    Marital status: /Civil Union     Spouse name: None    Number of children: None    Years of education: None    Highest education level: None   Occupational History    Occupation: [employer]     Employer: [employer]   Tobacco Use    Smoking status: Never Smoker    Smokeless tobacco: Never Used   Vaping Use    Vaping Use: Never used   Substance and Sexual Activity    Alcohol use:  Yes     Alcohol/week: 1 0 standard drink     Types: 1 Cans of beer per week     Comment: 4 weekly    Drug use: No    Sexual activity: Yes   Other Topics Concern    None   Social History Narrative    Travel Hx - Pt denies being out of the country during 1/2014-11/18/2014     Social Determinants of Health     Financial Resource Strain: Not on file   Food Insecurity: Not on file   Transportation Needs: Not on file   Physical Activity: Not on file   Stress: Not on file   Social Connections: Not on file   Intimate Partner Violence: Not on file   Housing Stability: Not on file       Family History   Problem Relation Age of Onset    Hyperlipidemia Mother    [facility] Hypertension Mother     Pulmonary embolism Mother     Diabetes type II Mother     Hyperlipidemia Father     Hypertension Father     Prostate cancer Father     Diabetes type II Father        PHQ-2/9 Depression Screening           Health Maintenance   Topic Date Due    DTaP,Tdap,and Td Vaccines (1 - Tdap) 09/02/2007    Fall Risk  08/27/2022    Depression Screening  08/27/2022    Medicare Annual Wellness Visit (AWV)  08/27/2022    Pneumococcal Vaccine: 65+ Years (2 of 2 - PPSV23) 08/27/2022    BMI: Followup Plan  01/28/2023    BMI: Adult  01/28/2023    Colorectal Cancer Screening  07/29/2024    Hepatitis C Screening  Completed    Influenza Vaccine  Completed    HIB Vaccine  Aged Out    Hepatitis B Vaccine  Aged Out    IPV Vaccine  Aged Out    Hepatitis A Vaccine  Aged Out    Meningococcal ACWY Vaccine  Aged Out    HPV Vaccine  Aged Out       Immunization History   Administered Date(s) Administered    COVID-19 PFIZER VACCINE 0 3 ML IM 03/19/2021, 04/09/2021    Influenza Quadrivalent Preservative Free 3 years and older IM 11/29/2017    Influenza Quadrivalent, 6-35 Months IM 11/21/2016    Influenza, high dose seasonal 0 7 mL 11/06/2020, 01/28/2022    Pneumococcal Conjugate 13-Valent 08/27/2021    Td (adult), adsorbed 09/01/2007        Objective:  Vitals:    01/28/22 0816 01/28/22 0855   BP: 142/80 136/76   BP Location: Left arm    Patient Position: Sitting    Cuff Size: Large    Pulse: 90    Temp: 98 2 °F (36 8 °C)    SpO2: 99%    Weight: 110 kg (243 lb)    Height: 5' 10" (1 778 m)      Wt Readings from Last 3 Encounters:   01/28/22 110 kg (243 lb)   08/27/21 110 kg (243 lb 6 4 oz)   04/23/21 111 kg (245 lb)     Body mass index is 34 87 kg/m²  No exam data present       Physical Exam  Vitals and nursing note reviewed  Constitutional:       General: He is not in acute distress  Appearance: Normal appearance  He is well-developed  He is obese  He is not diaphoretic        Comments: Alert pleasant cooperative  Seated in room in no acute distress   HENT:      Head: Normocephalic and atraumatic  Right Ear: Tympanic membrane normal       Left Ear: Tympanic membrane normal       Mouth/Throat:      Mouth: Mucous membranes are moist       Pharynx: No oropharyngeal exudate or posterior oropharyngeal erythema  Eyes:      General: No scleral icterus  Right eye: No discharge  Left eye: No discharge  Pupils: Pupils are equal, round, and reactive to light  Cardiovascular:      Rate and Rhythm: Normal rate and regular rhythm  Heart sounds: Normal heart sounds  No murmur heard  Pulmonary:      Effort: Pulmonary effort is normal  No respiratory distress  Breath sounds: Normal breath sounds  No wheezing or rales  Comments: Clear to auscultation throughout  No crackles no rhonchi no wheeze  No respiratory distress  Abdominal:      General: Bowel sounds are normal  There is no distension  Palpations: Abdomen is soft  Tenderness: There is no abdominal tenderness  There is no guarding  Comments: Positive bowel sounds soft nontender  Musculoskeletal:      Cervical back: Neck supple  Skin:     General: Skin is warm and dry  Neurological:      General: No focal deficit present  Mental Status: He is alert  Psychiatric:         Mood and Affect: Mood normal          Behavior: Behavior normal          Thought Content: Thought content normal              Future Appointments   Date Time Provider Phyllis Brooks   6/3/2022  7:15 AM Zora Cervantes PA-C [address]    Patient Care Team:  Zora Cervantes PA-C as PCP - General (Internal Medicine)  Theodor Peabody, MD as PCP - [address] [place] [address] (RTE)  [facility]    This note was completed in part utilizing [employer] Direct Software    Grammatical errors, random word insertions, spelling mistakes, and incomplete sentences can be an occasional consequence of this system secondary to software limitations, ambient noise, and hardware issues  If you have any questions or concerns about the content, text, or information contained within the body of this dictation, please contact the provider for clarification

## 2022-03-02 ENCOUNTER — HOSPITAL ENCOUNTER (OUTPATIENT)
Dept: RADIOLOGY | Facility: HOSPITAL | Age: 67
Discharge: HOME | End: 2022-03-02
Payer: COMMERCIAL

## 2022-03-02 DIAGNOSIS — E78.00 HYPERCHOLESTEROLEMIA: ICD-10-CM

## 2022-03-02 PROCEDURE — 75571 CT HRT W/O DYE W/CA TEST: CPT

## 2022-03-02 PROCEDURE — G1004 CDSM NDSC: HCPCS

## 2022-03-11 PROBLEM — R93.1 ELEVATED CORONARY ARTERY CALCIUM SCORE: Status: ACTIVE | Noted: 2022-03-11

## 2022-03-11 NOTE — RESULT ENCOUNTER NOTE
Called to review CAC score  LMOM requesting return call to [facility] to discuss results  ASCVD risk is 23% with CAC calculation  Recommend starting Lipitor 10mg daily for management of his elevated cholesterol and discontinue red yeast rice  Recommend he continue low dose aspirin  Due to his elevated CAC score mostly in LAD recommend further cardiac testing with stress echo  Order placed but would like to further discuss with patient  If he is not interested in testing would recommend he consider seeing cardiology for second opinion  Will await his return call  Message sent to him directly through epic as well  Could you please call patient and follow up with him early next week?    Thanks Remy Hurt

## 2022-03-16 ENCOUNTER — TELEPHONE (OUTPATIENT)
Dept: INTERNAL MEDICINE CLINIC | Age: 67
End: 2022-03-16

## 2022-03-16 DIAGNOSIS — E78.00 HYPERCHOLESTEROLEMIA: ICD-10-CM

## 2022-03-16 DIAGNOSIS — R93.1 ELEVATED CORONARY ARTERY CALCIUM SCORE: ICD-10-CM

## 2022-03-16 RX ORDER — ATORVASTATIN CALCIUM 10 MG/1
10 TABLET, FILM COATED ORAL DAILY
Qty: 90 TABLET | Refills: 0 | Status: SHIPPED | OUTPATIENT
Start: 2022-03-16 | End: 2022-03-16 | Stop reason: SDUPTHER

## 2022-03-16 NOTE — RESULT ENCOUNTER NOTE
Called and reviewed test result with patient  Answered questions about CAC score and stress test   Discussed risks and benefits of additional testing  He has stress echo scheduled as well as cardiology apt  He is agreeable to starting Lipitor  Rx sent to pharmacy  Encouraged him to start taking Lipitor every other day for 1 week then increase to once daily as he in the past was apprehensive to taking statin and want to limit risk of adverse effects  With time may need to increase statin strength  [facility] scheduled follow up

## 2022-04-08 ENCOUNTER — HOSPITAL ENCOUNTER (OUTPATIENT)
Dept: NON INVASIVE DIAGNOSTICS | Facility: CLINIC | Age: 67
Discharge: HOME | End: 2022-04-08
Payer: COMMERCIAL

## 2022-04-08 VITALS
BODY MASS INDEX: 34.79 KG/M2 | WEIGHT: 243 LBS | DIASTOLIC BLOOD PRESSURE: 86 MMHG | SYSTOLIC BLOOD PRESSURE: 146 MMHG | HEIGHT: 70 IN

## 2022-04-08 DIAGNOSIS — R93.1 ELEVATED CORONARY ARTERY CALCIUM SCORE: ICD-10-CM

## 2022-04-08 DIAGNOSIS — E78.00 HYPERCHOLESTEROLEMIA: ICD-10-CM

## 2022-04-08 LAB
MAX HR PERCENT: 101 %
MAX HR: 154 BPM
RATE PRESSURE PRODUCT: NORMAL
SL CV STRESS RECOVERY BP: NORMAL MMHG
SL CV STRESS RECOVERY HR: 123 BPM
SL CV STRESS RECOVERY O2 SAT: 96 %
SL CV STRESS STAGE REACHED: 2
STRESS ANGINA INDEX: 0
STRESS BASELINE BP: NORMAL MMHG
STRESS BASELINE HR: 122 BPM
STRESS O2 SAT REST: 98 %
STRESS PEAK HR: 157 BPM
STRESS PERCENT HR: 101 %
STRESS POST ESTIMATED WORKLOAD: 7 METS
STRESS POST EXERCISE DUR MIN: 5 MIN
STRESS POST EXERCISE DUR SEC: 0 SEC
STRESS POST O2 SAT PEAK: 98 %
STRESS POST PEAK BP: 160 MMHG
STRESS TARGET HR: 157 BPM

## 2022-04-08 PROCEDURE — 93350 STRESS TTE ONLY: CPT

## 2022-04-08 PROCEDURE — 93351 STRESS TTE COMPLETE: CPT | Performed by: INTERNAL MEDICINE

## 2022-04-08 NOTE — RESULT ENCOUNTER NOTE
Please contact patient  Stress echo appears normal   At this time no further cardiac workup needed at this time but he should continue with Lipitor 10 mg daily  Also please schedule him a follow-up to discuss these test results, review his BP in the next is 4-6 weeks  Thanks  Jena Monge

## 2022-04-12 ENCOUNTER — APPOINTMENT (OUTPATIENT)
Dept: LAB | Facility: MEDICAL CENTER | Age: 67
End: 2022-04-12
Payer: COMMERCIAL

## 2022-04-12 DIAGNOSIS — E79.0 ELEVATED URIC ACID IN BLOOD: ICD-10-CM

## 2022-04-12 DIAGNOSIS — E78.00 HYPERCHOLESTEROLEMIA: ICD-10-CM

## 2022-04-12 DIAGNOSIS — R17 ELEVATED BILIRUBIN: ICD-10-CM

## 2022-04-12 LAB
ALBUMIN SERPL BCP-MCNC: 3.5 G/DL (ref 3.5–5)
ALP SERPL-CCNC: 107 U/L (ref 46–116)
ALT SERPL W P-5'-P-CCNC: 38 U/L (ref 12–78)
ANION GAP SERPL CALCULATED.3IONS-SCNC: 5 MMOL/L (ref 4–13)
ARRHY DURING EX: NORMAL
AST SERPL W P-5'-P-CCNC: 31 U/L (ref 5–45)
BILIRUB SERPL-MCNC: 0.99 MG/DL (ref 0.2–1)
BILIRUB UR QL STRIP: NEGATIVE
BUN SERPL-MCNC: 24 MG/DL (ref 5–25)
CALCIUM SERPL-MCNC: 8.9 MG/DL (ref 8.3–10.1)
CHEST PAIN STATEMENT: NORMAL
CHLORIDE SERPL-SCNC: 112 MMOL/L (ref 100–108)
CLARITY UR: CLEAR
CO2 SERPL-SCNC: 23 MMOL/L (ref 21–32)
COLOR UR: ABNORMAL
CREAT SERPL-MCNC: 1.52 MG/DL (ref 0.6–1.3)
GFR SERPL CREATININE-BSD FRML MDRD: 47 ML/MIN/1.73SQ M
GLUCOSE SERPL-MCNC: 131 MG/DL (ref 65–140)
GLUCOSE UR STRIP-MCNC: ABNORMAL MG/DL
HGB UR QL STRIP.AUTO: NEGATIVE
KETONES UR STRIP-MCNC: NEGATIVE MG/DL
LEUKOCYTE ESTERASE UR QL STRIP: NEGATIVE
MAX DIASTOLIC BP: 82 MMHG
MAX HEART RATE: 157 BPM
MAX PREDICTED HEART RATE: 154 BPM
MAX. SYSTOLIC BP: 160 MMHG
NITRITE UR QL STRIP: NEGATIVE
PH UR STRIP.AUTO: 6 [PH]
POTASSIUM SERPL-SCNC: 4 MMOL/L (ref 3.5–5.3)
PROT SERPL-MCNC: 6.9 G/DL (ref 6.4–8.2)
PROT UR STRIP-MCNC: NEGATIVE MG/DL
PROTOCOL NAME: NORMAL
SODIUM SERPL-SCNC: 140 MMOL/L (ref 136–145)
SP GR UR STRIP.AUTO: 1.02 (ref 1–1.03)
TARGET HR FORMULA: NORMAL
TEST INDICATION: NORMAL
TIME IN EXERCISE PHASE: NORMAL
URATE SERPL-MCNC: 6.2 MG/DL (ref 4.2–8)
UROBILINOGEN UR STRIP-ACNC: <2 MG/DL

## 2022-04-12 PROCEDURE — 36415 COLL VENOUS BLD VENIPUNCTURE: CPT

## 2022-04-12 PROCEDURE — 80053 COMPREHEN METABOLIC PANEL: CPT

## 2022-04-12 PROCEDURE — 84550 ASSAY OF BLOOD/URIC ACID: CPT

## 2022-04-22 ENCOUNTER — RA CDI HCC (OUTPATIENT)
Dept: OTHER | Facility: HOSPITAL | Age: 67
End: 2022-04-22

## 2022-04-22 NOTE — PROGRESS NOTES
[facility]  coding opportunities       Chart reviewed, no opportunity found:   [address]        Patients Insurance     Medicare Insurance: [employer]

## 2022-04-29 ENCOUNTER — APPOINTMENT (OUTPATIENT)
Dept: RADIOLOGY | Age: 67
End: 2022-04-29
Payer: COMMERCIAL

## 2022-04-29 ENCOUNTER — OFFICE VISIT (OUTPATIENT)
Dept: INTERNAL MEDICINE CLINIC | Age: 67
End: 2022-04-29
Payer: COMMERCIAL

## 2022-04-29 VITALS
BODY MASS INDEX: 36.46 KG/M2 | HEIGHT: 70 IN | DIASTOLIC BLOOD PRESSURE: 72 MMHG | OXYGEN SATURATION: 98 % | HEART RATE: 101 BPM | SYSTOLIC BLOOD PRESSURE: 124 MMHG | TEMPERATURE: 97.8 F | WEIGHT: 254.7 LBS

## 2022-04-29 DIAGNOSIS — R79.89 BLOOD CREATININE INCREASED COMPARED WITH PRIOR MEASUREMENT: ICD-10-CM

## 2022-04-29 DIAGNOSIS — Z87.39 HISTORY OF GOUT: ICD-10-CM

## 2022-04-29 DIAGNOSIS — K64.8 OTHER HEMORRHOIDS: Primary | ICD-10-CM

## 2022-04-29 DIAGNOSIS — I10 BENIGN ESSENTIAL HYPERTENSION: ICD-10-CM

## 2022-04-29 DIAGNOSIS — M25.562 LEFT KNEE PAIN, UNSPECIFIED CHRONICITY: ICD-10-CM

## 2022-04-29 DIAGNOSIS — R93.1 ELEVATED CORONARY ARTERY CALCIUM SCORE: ICD-10-CM

## 2022-04-29 DIAGNOSIS — E66.09 CLASS 2 OBESITY DUE TO EXCESS CALORIES WITHOUT SERIOUS COMORBIDITY WITH BODY MASS INDEX (BMI) OF 36.0 TO 36.9 IN ADULT: ICD-10-CM

## 2022-04-29 DIAGNOSIS — N18.31 STAGE 3A CHRONIC KIDNEY DISEASE (HCC): ICD-10-CM

## 2022-04-29 DIAGNOSIS — E78.00 HYPERCHOLESTEROLEMIA: ICD-10-CM

## 2022-04-29 PROBLEM — E66.01 OBESITY, MORBID (HCC): Status: ACTIVE | Noted: 2022-04-29

## 2022-04-29 PROCEDURE — 99214 OFFICE O/P EST MOD 30 MIN: CPT | Performed by: PHYSICIAN ASSISTANT

## 2022-04-29 PROCEDURE — 73562 X-RAY EXAM OF KNEE 3: CPT

## 2022-04-29 RX ORDER — HYDROCORTISONE 25 MG/G
CREAM TOPICAL 2 TIMES DAILY PRN
Qty: 28 G | Refills: 0 | Status: SHIPPED | OUTPATIENT
Start: 2022-04-29 | End: 2022-05-27 | Stop reason: SDUPTHER

## 2022-04-29 NOTE — ASSESSMENT & PLAN NOTE
Patient to use Tylenol as needed for pain  Do not use over-the-counter Motrin Aleve ibuprofen as they are cleared by the kidneys  Go for the x-ray of her left knee schedule physical therapy  Can continue to use topical mineralized as needed for any pain  Discussed importance of stretches and knee range of motion exercise  Encouraged weight loss which also can take pressure off knee    If symptoms persist or do not responded PT will consider MRI and orthopedic evaluation

## 2022-04-29 NOTE — ASSESSMENT & PLAN NOTE
Lab Results   Component Value Date    EGFR 47 04/12/2022    EGFR 59 12/29/2021    EGFR 58 08/20/2021    CREATININE 1 52 (H) 04/12/2022    CREATININE 1 26 12/29/2021    CREATININE 1 27 08/20/2021     Slight worsening of GFR since last labs  Increased BUN and creatinine however patient was not well hydrated prior to lab  Advised him to go for follow-up fasting basic metabolic panel will be make sure you drink plenty of fluids prior to the laboratory studies  Recommend ultrasound of kidney and bladder to further evaluate worsening GFR  Recent urinalysis did not show any protein in the urine  Pending test results may consider Nephrology evaluation  Currently patient on ACE-inhibitor

## 2022-04-29 NOTE — ASSESSMENT & PLAN NOTE
Topical Anusol HC cream to use as directed for perirectal itching  Recommended rectal exam today for further eval   Patient deferred rectal exam today  Patient has used cream previously for similar complaint and responded well  Refill given today  Colonoscopy <1 year ago    Report back if sx do not fully resolve

## 2022-04-29 NOTE — PROGRESS NOTES
[facility]  Standard Office Visit  Patient ID: Damion Watters    : 1955  Age/Gender: 77 y o  male     DATE: 2022      Assessment/Plan:    Left knee pain  Patient to use Tylenol as needed for pain  Do not use over-the-counter Motrin Aleve ibuprofen as they are cleared by the kidneys  Go for the x-ray of her left knee schedule physical therapy  Can continue to use topical mineralized as needed for any pain  Discussed importance of stretches and knee range of motion exercise  Encouraged weight loss which also can take pressure off knee  If symptoms persist or do not responded PT will consider MRI and orthopedic evaluation    Stage 3a chronic kidney disease [facility])  Lab Results   Component Value Date    EGFR 47 2022    EGFR 59 2021    EGFR 58 2021    CREATININE 1 52 (H) 2022    CREATININE 1 26 2021    CREATININE 1 27 2021     Slight worsening of GFR since last labs  Increased BUN and creatinine however patient was not well hydrated prior to lab  Advised him to go for follow-up fasting basic metabolic panel will be make sure you drink plenty of fluids prior to the laboratory studies  Recommend ultrasound of kidney and bladder to further evaluate worsening GFR  Recent urinalysis did not show any protein in the urine  Pending test results may consider Nephrology evaluation  Currently patient on ACE-inhibitor  Other hemorrhoids  Topical Anusol HC cream to use as directed for perirectal itching  Recommended rectal exam today for further eval   Patient deferred rectal exam today  Patient has used cream previously for similar complaint and responded well  Refill given today  Colonoscopy <1 year ago  Report back if sx do not fully resolve    Benign essential hypertension  BP controlled at this time on meds (lisinopril and cardura)    Discussed several times in the past cardura is not optimal medication for BP and can causes orthostasis  Patient has been on this for some time and is reluctant to make any change at this time  Elevated coronary artery calcium score  CAC score 506 after which the patient has been started on lipitor which he is tolerating  He has also has recent stress echo which was negative  Continue to follow with cardiology to further discuss tx options and ongoing screening  Too soon to check lipids as he has been on statin for about 1 month  Hypercholesterolemia  Tolerating statin, too soon for repeat lipid panel but will discuss repeat in 3 months  History of gout  Stable, no recent flares, continue allopurinol 200mg PO daily  Obesity, morbid ([address] )  Encouraged daily activity and weight loss which can help knee pain  Diagnoses and all orders for this visit:    Other hemorrhoids  -     hydrocortisone (ANUSOL-HC) 2 5 % rectal cream; Apply topically 2 (two) times a day as needed for hemorrhoids    Benign essential hypertension  -     Basic metabolic panel; Future  -     US kidney and bladder; Future    Left knee pain, unspecified chronicity  Comments:  Tylneol for pain  Go for L knee xray  Schedule PT  Orders:  -     XR knee 3 vw left non injury; Future  -     Ambulatory Referral to Physical Therapy; Future    Stage 3a chronic kidney disease (HCC)  -     Basic metabolic panel; Future  -     US kidney and bladder; Future    Blood creatinine increased compared with prior measurement  -     Basic metabolic panel; Future  -     US kidney and bladder; Future    Class 2 obesity due to excess calories without serious comorbidity with body mass index (BMI) of 36 0 to 36 9 in adult    History of gout    Hypercholesterolemia    Elevated coronary artery calcium score          BMI Counseling: Body mass index is 36 55 kg/m²   The BMI is above normal  Nutrition recommendations include decreasing portion sizes, encouraging healthy choices of fruits and vegetables, limiting drinks that contain sugar, moderation in carbohydrate intake, increasing intake of lean protein, reducing intake of saturated and trans fat and reducing intake of cholesterol  Exercise recommendations include exercising 3-5 times per week  No pharmacotherapy was ordered  Patient referred to PCP  Rationale for BMI follow-up plan is due to patient being overweight or obese  Depression Screening and Follow-up Plan: Patient was screened for depression during today's encounter  They screened negative with a PHQ-2 score of 0  Subjective:   Chief Complaint   Patient presents with    Follow-up     blood work done 4/12/22, stress test done on 4/8/22- pt would like to discuss about rectal cream, OTC not working    Knee Pain     left knee- pain started a couple months ago, pt states pain comes and goes- pt states pain is worse at night when knee is stretched out- Pt used aspercreme- somewhat effective     health maintenance     PHQ done         Cortez Feldman is a 77 y o  male who presents to the office on 4/29/2022 for       76 yo male with h/o HTN, gout, hemorrhoids, elevated CAC score present to office for chronic condition follow up  Since last visit he went for CAC scoring and due to increased CAC score he started Lipitor 10 mg daily  He started taking Lipitor every other day at first but went up to once daily after he saw he was tolerating it  He has cardiology apt scheduled  He also went for a stress echo which was normal   No CP or palpitations  NO SOB  He reports that he has gained weight and has not really changed much in his diet  He would like to discuss his L knee pain which has been there for some time  He notes his pain has only been since he retired  No trauma or injury he can recall  Notes that he worked on concrete floor for several years which may have contributed  He notes his knee hurts, does not swell up  Certain positions make it worse (worse when it is stretched out) in extension    He notes pain was worse 2 weeks ago and he started using a topical gel at night which seemed to help  Notes he does not want to overuse it  He notes his pain has calmed down but he knows it is there on days that he over-works his knee  Worse after a day of walking and bending  Notes his knee pain is worse if he is kneeling and has to get back up  Improves with change in position and he is able to get back into a comfortable position  Noes he did have knee injury at work many years ago bu is unsure if it was the same knee as he is experiencing sx today  No personal history of kidney problems  No recent gout flareup  He reports he was fasting for his labs and did not drink water that day which could have contributed to his renal fxn   + rectal itching, no rectal pain or swelling  H/o hemorrhoids tx well in the past with topical cream   Wants refill of cream   Not interested in rectal exam today  Has had colonoscopy <1 year ago  Knee Pain   The incident occurred more than 1 week ago  The incident occurred at home  There was no injury mechanism  The pain is present in the left knee  The pain is at a severity of 7/10  The pain is moderate  The pain has been fluctuating since onset  Associated symptoms include a loss of motion  Pertinent negatives include no inability to bear weight, loss of sensation, muscle weakness, numbness or tingling  Treatments tried: topical    Hypertension  This is a chronic problem  The current episode started more than 1 year ago  The problem is unchanged  The problem is controlled  Associated symptoms include anxiety  Pertinent negatives include no chest pain, palpitations, peripheral edema or shortness of breath         The following portions of the patient's history were reviewed and updated as appropriate: allergies, current medications, past family history, past medical history, past social history, past surgical history and problem list     Review of Systems   Constitutional: Negative for chills and fever  HENT: Negative for ear pain and sore throat  Eyes: Negative for visual disturbance  Uses reading glasses, last eye doctor 2/2022   Respiratory: Negative for cough, shortness of breath and wheezing  Cardiovascular: Negative for chest pain and palpitations  Gastrointestinal: Negative for abdominal pain, diarrhea, nausea and vomiting  Genitourinary: Negative for dysuria and hematuria  Musculoskeletal: Positive for arthralgias and gait problem (limping due to his knee, sometimes with some L hip pain  Favors his L knee)  Negative for back pain  Skin: Negative for color change and rash  Neurological: Negative for dizziness, tingling, syncope and numbness  All other systems reviewed and are negative          Patient Active Problem List   Diagnosis    Benign essential hypertension    Hypercholesterolemia    History of gout    Obesity, Class I, BMI 30-34 9    Impaired fasting glucose    Family history of malignant neoplasm of prostate    Other hemorrhoids    Bereavement    Elevated bilirubin    Elevated uric acid in blood    Screen for colon cancer    Need for influenza vaccination    Polyp of colon    Hx of colonic polyps    Vaccine counseling    Welcome to Medicare preventive visit    Stage 3a chronic kidney disease ([facility] [address] )    Elevated coronary artery calcium score    Left knee pain    Obesity, morbid ([address] )       Past Medical History:   Diagnosis Date    Blurred vision     Last assessed - 1/28/15    CAP (community acquired pneumonia)     Last assessed - 9/20/16    Hemorrhoids     Last assessed - 11/23/15    HTN (hypertension)     Hypercholesterolemia     IFG (impaired fasting glucose)     Last assessed - 11/18/14    Rotator cuff tendinitis     Last assessed - 6/17/15       Past Surgical History:   Procedure Laterality Date    APPENDECTOMY      Resolved - 1/24/09    COLONOSCOPY      2021    WISDOM TOOTH EXTRACTION           Current Outpatient Medications:     allopurinol (ZYLOPRIM) 100 mg tablet, Take 2 tablets (200 mg total) by mouth daily, Disp: 90 tablet, Rfl: 1    aspirin 81 MG tablet, Take 1 tablet by mouth daily, Disp: , Rfl:     atorvastatin (LIPITOR) 10 mg tablet, Take 1 tablet (10 mg total) by mouth daily, Disp: 90 tablet, Rfl: 0    doxazosin (CARDURA) 8 MG tablet, Take 1 tablet (8 mg total) by mouth daily, Disp: 90 tablet, Rfl: 1    lisinopril (ZESTRIL) 30 mg tablet, Take 1 tablet (30 mg total) by mouth daily, Disp: 90 tablet, Rfl: 1    Omega-3 1000 MG CAPS, Take 1 capsule by mouth daily  , Disp: , Rfl:     hydrocortisone (ANUSOL-HC) 2 5 % rectal cream, Apply topically 2 (two) times a day as needed for hemorrhoids, Disp: 28 g, Rfl: 0    Allergies   Allergen Reactions    Penicillins Hives    Pollen Extract        Social History     Socioeconomic History    Marital status: /Civil Union     Spouse name: None    Number of children: None    Years of education: None    Highest education level: None   Occupational History    Occupation: [employer]     Employer: [employer]   Tobacco Use    Smoking status: Never Smoker    Smokeless tobacco: Never Used   Vaping Use    Vaping Use: Never used   Substance and Sexual Activity    Alcohol use:  Yes     Alcohol/week: 1 0 standard drink     Types: 1 Cans of beer per week     Comment: 4 weekly    Drug use: No    Sexual activity: Yes   Other Topics Concern    None   Social History Narrative    Travel Hx - Pt denies being out of the country during 1/2014-11/18/2014     Social Determinants of Health     Financial Resource Strain: Not on file   Food Insecurity: Not on file   Transportation Needs: Not on file   Physical Activity: Not on file   Stress: Not on file   Social Connections: Not on file   Intimate Partner Violence: Not on file   Housing Stability: Not on file       Family History   Problem Relation Age of Onset    Hyperlipidemia Mother     Hypertension Mother    Shanell Lafleur Pulmonary embolism Mother     Diabetes type II Mother     Hyperlipidemia Father     Hypertension Father     Prostate cancer Father     Diabetes type II Father        PHQ-2/9 Depression Screening    Little interest or pleasure in doing things: 0 - not at all  Feeling down, depressed, or hopeless: 0 - not at all  PHQ-2 Score: 0  PHQ-2 Interpretation: Negative depression screen         Health Maintenance   Topic Date Due    DTaP,Tdap,and Td Vaccines (1 - Tdap) 09/02/2007    COVID-19 Vaccine (3 - Booster for Sehpard Peter series) 09/09/2021    Fall Risk  08/27/2022    Medicare Annual Wellness Visit (AWV)  08/27/2022    Pneumococcal Vaccine: 65+ Years (2 of 2 - PPSV23) 08/27/2022    BMI: Followup Plan  01/28/2023    Depression Screening  04/29/2023    BMI: Adult  04/29/2023    Colorectal Cancer Screening  07/29/2024    Hepatitis C Screening  Completed    Influenza Vaccine  Completed    HIB Vaccine  Aged Out    Hepatitis B Vaccine  Aged Out    IPV Vaccine  Aged Out    Hepatitis A Vaccine  Aged Out    Meningococcal ACWY Vaccine  Aged Out    HPV Vaccine  Aged Out       Immunization History   Administered Date(s) Administered    COVID-19 PFIZER VACCINE 0 3 ML IM 03/19/2021, 04/09/2021    Influenza Quadrivalent Preservative Free 3 years and older IM 11/29/2017    Influenza Quadrivalent, 6-35 Months IM 11/21/2016    Influenza, high dose seasonal 0 7 mL 11/06/2020, 01/28/2022    Pneumococcal Conjugate 13-Valent 08/27/2021    Td (adult), adsorbed 09/01/2007        Objective:  Vitals:    04/29/22 0811   BP: 124/72   BP Location: Left arm   Patient Position: Sitting   Cuff Size: Standard   Pulse: 101   Temp: 97 8 °F (36 6 °C)   TempSrc: Temporal   SpO2: 98%   Weight: 116 kg (254 lb 11 2 oz)   Height: 5' 10" (1 778 m)     Wt Readings from Last 3 Encounters:   04/29/22 116 kg (254 lb 11 2 oz)   04/08/22 110 kg (243 lb)   01/28/22 110 kg (243 lb)     Body mass index is 36 55 kg/m²    No exam data present Physical Exam  Vitals and nursing note reviewed  Constitutional:       General: He is not in acute distress  Appearance: Normal appearance  He is well-developed  He is obese  He is not diaphoretic  HENT:      Head: Normocephalic and atraumatic  Right Ear: Tympanic membrane normal       Left Ear: Tympanic membrane normal       Mouth/Throat:      Mouth: Mucous membranes are moist       Pharynx: No oropharyngeal exudate or posterior oropharyngeal erythema  Eyes:      General: No scleral icterus  Right eye: No discharge  Left eye: No discharge  Pupils: Pupils are equal, round, and reactive to light  Cardiovascular:      Rate and Rhythm: Normal rate and regular rhythm  Heart sounds: Normal heart sounds  No murmur heard  Pulmonary:      Effort: Pulmonary effort is normal  No respiratory distress  Breath sounds: Normal breath sounds  No wheezing or rales  Abdominal:      General: Bowel sounds are normal  There is no distension  Palpations: Abdomen is soft  Tenderness: There is no abdominal tenderness  There is no guarding  Comments: Soft, NT/ND   +BS   Genitourinary:     Comments: Rectal deferred  by patient  Musculoskeletal:      Cervical back: Neck supple  Right knee: No swelling, effusion, erythema, ecchymosis or bony tenderness  Normal range of motion  No tenderness  Left knee: Crepitus present  No swelling, effusion, erythema, ecchymosis or bony tenderness  Normal range of motion  Tenderness present over the medial joint line  Instability Tests: Anterior drawer test negative  Posterior drawer test negative  Anterior Lachman test negative  Skin:     General: Skin is warm and dry  Findings: No rash  Neurological:      General: No focal deficit present  Mental Status: He is alert  Psychiatric:         Mood and Affect: Mood normal          Behavior: Behavior normal          Thought Content:  Thought content normal  Judgment: Judgment normal              Future Appointments   Date Time Provider Phyllis Cecilia   5/3/2022  4:15 PM Raffi Smith   5/6/2022  2:00 PM DO PEEWEE Liriano [facility]   5/27/2022  8:15 AM Lucy Velasquez PA-C hospitals [facility]   6/3/2022  7:15 AM Lucy Velasquez PA-C [address] [place] [address]       Lucy [address]    Patient Care Team:  Lucy Velasquez PA-C as PCP - General (Internal Medicine)  Jose De Jesus Lyon MD as PCP - [address] [place] [address] (RTE)  Jose De Jesus Lyon MD as PCP - [facility] (RTE)    This note was completed in part utilizing M-Modal Fluency Direct Software  Grammatical errors, random word insertions, spelling mistakes, and incomplete sentences can be an occasional consequence of this system secondary to software limitations, ambient noise, and hardware issues  If you have any questions or concerns about the content, text, or information contained within the body of this dictation, please contact the provider for clarification

## 2022-04-30 NOTE — ASSESSMENT & PLAN NOTE
BP controlled at this time on meds (lisinopril and cardura)  Discussed several times in the past cardura is not optimal medication for BP and can causes orthostasis  Patient has been on this for some time and is reluctant to make any change at this time

## 2022-04-30 NOTE — ASSESSMENT & PLAN NOTE
CAC score 506 after which the patient has been started on lipitor which he is tolerating  He has also has recent stress echo which was negative  Continue to follow with cardiology to further discuss tx options and ongoing screening  Too soon to check lipids as he has been on statin for about 1 month

## 2022-04-30 NOTE — PATIENT INSTRUCTIONS
Left knee pain  Patient to use Tylenol as needed for pain  Do not use over-the-counter Motrin Aleve ibuprofen as they are cleared by the kidneys  Go for the x-ray of her left knee schedule physical therapy  Can continue to use topical mineralized as needed for any pain  Discussed importance of stretches and knee range of motion exercise  Encouraged weight loss which also can take pressure off knee  If symptoms persist or do not responded PT will consider MRI and orthopedic evaluation    Stage 3a chronic kidney disease [facility])  Lab Results   Component Value Date    EGFR 47 04/12/2022    EGFR 59 12/29/2021    EGFR 58 08/20/2021    CREATININE 1 52 (H) 04/12/2022    CREATININE 1 26 12/29/2021    CREATININE 1 27 08/20/2021     Slight worsening of GFR since last labs  Increased BUN and creatinine however patient was not well hydrated prior to lab  Advised him to go for follow-up fasting basic metabolic panel will be make sure you drink plenty of fluids prior to the laboratory studies  Recommend ultrasound of kidney and bladder to further evaluate worsening GFR  Recent urinalysis did not show any protein in the urine  Pending test results may consider Nephrology evaluation  Currently patient on ACE-inhibitor  Other hemorrhoids  Topical Anusol HC cream to use as directed for perirectal itching  Recommended rectal exam today for further eval   Patient deferred rectal exam today  Patient has used cream previously for similar complaint and responded well  Refill given today  Colonoscopy <1 year ago  Report back if sx do not fully resolve    Benign essential hypertension  BP controlled at this time on meds (lisinopril and cardura)  Discussed several times in the past cardura is not optimal medication for BP and can causes orthostasis  Patient has been on this for some time and is reluctant to make any change at this time        Elevated coronary artery calcium score  CAC score 506 after which the patient has been started on lipitor which he is tolerating  He has also has recent stress echo which was negative  Continue to follow with cardiology to further discuss tx options and ongoing screening  Too soon to check lipids as he has been on statin for about 1 month  Hypercholesterolemia  Tolerating statin, too soon for repeat lipid panel but will discuss repeat in 3 months  History of gout  Stable, no recent flares, continue allopurinol 200mg PO daily  Obesity, morbid ([address] )  Encouraged daily activity and weight loss which can help knee pain

## 2022-05-03 ENCOUNTER — EVALUATION (OUTPATIENT)
Dept: PHYSICAL THERAPY | Age: 67
End: 2022-05-03
Payer: COMMERCIAL

## 2022-05-03 DIAGNOSIS — M25.562 LEFT KNEE PAIN, UNSPECIFIED CHRONICITY: ICD-10-CM

## 2022-05-03 PROCEDURE — 97161 PT EVAL LOW COMPLEX 20 MIN: CPT | Performed by: PHYSICAL THERAPIST

## 2022-05-03 PROCEDURE — 97112 NEUROMUSCULAR REEDUCATION: CPT | Performed by: PHYSICAL THERAPIST

## 2022-05-03 PROCEDURE — 97110 THERAPEUTIC EXERCISES: CPT | Performed by: PHYSICAL THERAPIST

## 2022-05-03 NOTE — PROGRESS NOTES
PT Evaluation     Today's date: 5/3/2022  Patient name: Shannon Miller  : 1955  MRN: [id number]  Referring provider: Mago Reagan PA-C  Dx:   Encounter Diagnosis     ICD-10-CM    1  Left knee pain, unspecified chronicity  M25 562 Ambulatory Referral to Physical Therapy    Tylneol for pain  Go for L knee xray  Schedule PT  Assessment  Assessment details: Patient presents complaining of L knee pain, which has been ongoing for awhile, per patient  He reported feeling the pain walking up his driveway last year, and now feels his pain has gotten worse  He does also report some cracking in his knee, especially first thing in the morning  He reports the pain is worse with increased activity, which then effects his sleep, as well as some difficulty going up and down steps  He locates his pain to the medial aspect of his L knee, he denies any referred pain into his lower leg, he also denies any numbness or tingling     He had x-rays completed but results are not yet available     He is currently retired     Patient presents with limitations in L knee range of motion and strength consistent with L knee OA vs medial meniscus pathology  Patient would benefit from skilled physical therapy to address limitations and deficits  Patient provided with HEP  Patient made aware of condition as well as the proposed treatment plan, including risks, benefits and alternatives  Impairments: abnormal or restricted ROM, activity intolerance, impaired physical strength, lacks appropriate home exercise program and pain with function     Prognosis: good    Goals  ST- demonstrate compliancy with HEP in 1 week     Decrease reported pain to 5/10 at worst and with activity, to improve functional capacity, within 3 weeks   Improve L knee range of motion to 0-120 with no complaints of pain, within 3 weeks     LT- Improve FOTO score to specified value to improve patients perceived benefit of therapy, in 8 weeks Improve L LE strength to 4+/5, with no complaints of pain, within 8 weeks   Be able to increase activity level at home and in the community, within 10 weeks     Plan  Plan details: Physical therapy with focus on there ex and manual therapy to improve ability to complete tasks around the house and complete functional activities, use of modalities as needed     Patient would benefit from: skilled physical therapy  Referral necessary: No  Planned modality interventions: cryotherapy, TENS and thermotherapy: hydrocollator packs  Planned therapy interventions: ADL training, balance, balance/weight bearing training, gait training, manual therapy, joint mobilization, neuromuscular re-education, strengthening, stretching, therapeutic activities and therapeutic exercise  Frequency: 2x week  Duration in weeks: 12  Plan of Care beginning date: 5/3/2022  Plan of Care expiration date: 2022  Treatment plan discussed with: patient        Subjective Evaluation    History of Present Illness  Date of onset: 5/3/2021  Mechanism of injury: Chronic onset   Pain  Current pain ratin  At best pain ratin  At worst pain rating: 10  Location: L medial knee   Quality: sharp and burning  Aggravating factors: stair climbing, standing, walking and sitting  Progression: worsening    Treatments  No previous or current treatments  Patient Goals  Patient goals for therapy: decreased pain and independence with ADLs/IADLs  Patient goal: be active without increases in pain         Objective     General Comments:      Knee Comments  Ttp along medial L knee, especially anteriorly towards patella     rom  L knee 7-110*  R knee WFL    mmt  Hip flexion L=4* R=4+  Hip abduction L=4+ R=4+  Hip adduction L=4+ R=4+  Knee extension L=4* R=4+  Knee flexion L=4* R=4+    Special tests  (+) thessalys     Joint play  TF hypomobility noted both in anterior and posterior direction   Patellar hypomobility noted B/L (L>R)              Precautions: HTN      Manuals             Patellar ROM             TF JM post/ant              L knee ROM                          Neuro Re-Ed             QS             Bridges              S/L clamshells              SLR abduction, extension              Hip 3-way w TB             Mini squats              Squires step backs nv            Ther Ex             Bike              Heel slides                                                                                           Ther Activity                                       Gait Training                                       Modalities

## 2022-05-06 ENCOUNTER — OFFICE VISIT (OUTPATIENT)
Dept: CARDIOLOGY CLINIC | Facility: CLINIC | Age: 67
End: 2022-05-06
Payer: COMMERCIAL

## 2022-05-06 VITALS
WEIGHT: 253.9 LBS | HEART RATE: 74 BPM | OXYGEN SATURATION: 99 % | BODY MASS INDEX: 36.35 KG/M2 | HEIGHT: 70 IN | SYSTOLIC BLOOD PRESSURE: 126 MMHG | DIASTOLIC BLOOD PRESSURE: 68 MMHG

## 2022-05-06 DIAGNOSIS — I10 HYPERTENSION, UNSPECIFIED TYPE: Primary | ICD-10-CM

## 2022-05-06 DIAGNOSIS — E78.5 HYPERLIPIDEMIA, UNSPECIFIED HYPERLIPIDEMIA TYPE: ICD-10-CM

## 2022-05-06 DIAGNOSIS — R93.1 ELEVATED CORONARY ARTERY CALCIUM SCORE: ICD-10-CM

## 2022-05-06 PROBLEM — M25.562 LEFT KNEE PAIN: Status: RESOLVED | Noted: 2022-04-29 | Resolved: 2022-05-06

## 2022-05-06 PROBLEM — M17.12 TRICOMPARTMENT OSTEOARTHRITIS OF LEFT KNEE: Status: ACTIVE | Noted: 2022-05-06

## 2022-05-06 PROCEDURE — 99204 OFFICE O/P NEW MOD 45 MIN: CPT | Performed by: INTERNAL MEDICINE

## 2022-05-06 PROCEDURE — 93000 ELECTROCARDIOGRAM COMPLETE: CPT | Performed by: INTERNAL MEDICINE

## 2022-05-06 RX ORDER — ATORVASTATIN CALCIUM 20 MG/1
20 TABLET, FILM COATED ORAL DAILY
Qty: 90 TABLET | Refills: 1 | Status: SHIPPED | OUTPATIENT
Start: 2022-05-06

## 2022-05-06 NOTE — PROGRESS NOTES
Cardiology     Clinic Visit Note  Shannon Miller 77 y o  male   MRN: [id number]    Assessment and Plan      Diagnoses and all orders for this visit:    1, Hypertension, unspecified type - Stable will continue to follow up with his PCP regarding this and follow his blood pressures outpatient    -     POCT ECG    2  Hyperlipidemia, unspecified hyperlipidemia type - Patient with elevated coronary artery calcium score - he wishes his PCP to follow his lipid panels - he does not want to do 40 mg of atorvastatin will do 20 mg now - explained we are aiming for a 50% decrease in LDL  He states that his PCP will now follow this  N  -     atorvastatin (LIPITOR) 20 mg tablet; Take 1 tablet (20 mg total) by mouth daily    3,  Elevated coronary artery calcium score - Now on statin therapy with atorvastatin 10 mg wants to slowly escalate will go up to 20 now  No chest pain  No shortness of breath  Asymptomatic from a cardiac standpoint  No indication for additional testing at this time in the absence of symptoms  Schedule a follow-up appointment as needed - patient wishes his PCP to follow up with his hyperlipidemia  Chief Complaint: Elevated coronary artery calcium   Subjective     History of Present Illness:  77year old male PMH of HTN who presents as a consultation from his primary care  The patient was hesitant to start a statin then had a CAC score done that showed a score of 506  The patients LDL was 109  His ASCVD score is now greater than 20%  He was started on atorvastatin 10 mg  He was given an echo stress test - that was negative for any evidence of ischemia  The patient went for 5 minutes and achieved 7 metabolic equivalents  He comes in today for a follow-up  He reports that he is currently taking atorvastatin, aspirin and lisinopril  As far as his symptoms he reports no chest pain  He does not exercise much due to a bad knee, but no shortness of breath, and no chest pain   He has no palpitations  Family history is significant for pacemaker in his father as well as minor stroke in his father  Nothing on mother side  No heart disease in sister either  He is former smoker  He does not drink much  He used to work in the concrete at [employer]  Review of Systems   Constitutional: Negative for unexpected weight change  Respiratory: Negative for chest tightness  Cardiovascular: Negative for chest pain  Gastrointestinal: Negative for abdominal distention  Allergic/Immunologic: Negative for environmental allergies  Neurological: Negative for dizziness  Psychiatric/Behavioral: Negative for agitation           Current Outpatient Medications:     allopurinol (ZYLOPRIM) 100 mg tablet, Take 2 tablets (200 mg total) by mouth daily, Disp: 90 tablet, Rfl: 1    aspirin 81 MG tablet, Take 1 tablet by mouth daily, Disp: , Rfl:     atorvastatin (LIPITOR) 10 mg tablet, Take 1 tablet (10 mg total) by mouth daily, Disp: 90 tablet, Rfl: 0    doxazosin (CARDURA) 8 MG tablet, Take 1 tablet (8 mg total) by mouth daily, Disp: 90 tablet, Rfl: 1    hydrocortisone (ANUSOL-HC) 2 5 % rectal cream, Apply topically 2 (two) times a day as needed for hemorrhoids, Disp: 28 g, Rfl: 0    lisinopril (ZESTRIL) 30 mg tablet, Take 1 tablet (30 mg total) by mouth daily, Disp: 90 tablet, Rfl: 1    Omega-3 1000 MG CAPS, Take 1 capsule by mouth daily  , Disp: , Rfl:   Past Medical History:   Diagnosis Date    Blurred vision     Last assessed - 1/28/15    CAP (community acquired pneumonia)     Last assessed - 9/20/16    Hemorrhoids     Last assessed - 11/23/15    HTN (hypertension)     Hypercholesterolemia     IFG (impaired fasting glucose)     Last assessed - 11/18/14    Rotator cuff tendinitis     Last assessed - 6/17/15     Past Surgical History:   Procedure Laterality Date    APPENDECTOMY      Resolved - 1/24/09    COLONOSCOPY      2021    WISDOM TOOTH EXTRACTION       Social History     Socioeconomic History    Marital status: /Civil Union     Spouse name: Not on file    Number of children: Not on file    Years of education: Not on file    Highest education level: Not on file   Occupational History    Occupation: [employer]     Employer: [employer]   Tobacco Use    Smoking status: Never Smoker    Smokeless tobacco: Never Used   Vaping Use    Vaping Use: Never used   Substance and Sexual Activity    Alcohol use: Yes     Alcohol/week: 1 0 standard drink     Types: 1 Cans of beer per week     Comment: 4 weekly    Drug use: No    Sexual activity: Yes   Other Topics Concern    Not on file   Social History Narrative    Travel Hx - Pt denies being out of the country during 1/2014-11/18/2014     Social Determinants of Health     Financial Resource Strain: Not on file   Food Insecurity: Not on file   Transportation Needs: Not on file   Physical Activity: Not on file   Stress: Not on file   Social Connections: Not on file   Intimate Partner Violence: Not on file   Housing Stability: Not on file     Family History   Problem Relation Age of Onset    Hyperlipidemia Mother     Hypertension Mother     Pulmonary embolism Mother     Diabetes type II Mother     Hyperlipidemia Father     Hypertension Father     Prostate cancer Father     Diabetes type II Father      Allergies   Allergen Reactions    Penicillins Hives    Pollen Extract        Objective     Vitals:    05/06/22 1352   BP: 126/68   BP Location: Left arm   Patient Position: Sitting   Cuff Size: Large   Pulse: 74   SpO2: 99%   Weight: 115 kg (253 lb 14 4 oz)   Height: 5' 10" (1 778 m)       Physical exam:     Physical Exam  Cardiovascular:      Rate and Rhythm: Normal rate and regular rhythm  Pulmonary:      Effort: Pulmonary effort is normal  No respiratory distress  Neurological:      General: No focal deficit present  Mental Status: He is alert     Psychiatric:         Mood and Affect: Mood normal            ==  PLEASE NOTE:  This encounter was completed utilizing the [employer] Direct Speech Voice Recognition Software  Grammatical errors, random word insertions, pronoun errors and incomplete sentences are occasional consequences of the system due to software limitations, ambient noise and hardware issues  These may be missed by proof reading prior to affixing electronic signature  Any questions or concerns about the content, text or information contained within the body of this dictation should be directly addressed to the physician for clarification  Please do not hesitate to call me directly if you have any any questions or concerns

## 2022-05-06 NOTE — RESULT ENCOUNTER NOTE
[facility] discuss [facility] results  Xray showing tricompartmental osteoarthritis  I would recommend he schedule with orthopedics (as well as with PT)  I placed the referral for ortho and he was given PT last visit  Please inform him of his test results and my recommendation for him to see ortho    Thanks Tony Howard

## 2022-05-10 ENCOUNTER — OFFICE VISIT (OUTPATIENT)
Dept: PHYSICAL THERAPY | Age: 67
End: 2022-05-10
Payer: COMMERCIAL

## 2022-05-10 DIAGNOSIS — M25.562 LEFT KNEE PAIN, UNSPECIFIED CHRONICITY: Primary | ICD-10-CM

## 2022-05-10 PROCEDURE — 97112 NEUROMUSCULAR REEDUCATION: CPT | Performed by: PHYSICAL THERAPIST

## 2022-05-10 PROCEDURE — 97140 MANUAL THERAPY 1/> REGIONS: CPT | Performed by: PHYSICAL THERAPIST

## 2022-05-10 PROCEDURE — 97110 THERAPEUTIC EXERCISES: CPT | Performed by: PHYSICAL THERAPIST

## 2022-05-10 NOTE — PROGRESS NOTES
Daily Note     Today's date: 5/10/2022  Patient name: Donn Rodriguez  : 1955  MRN: [id number]  Referring provider: Con Litten, PA-C  Dx:   Encounter Diagnosis     ICD-10-CM    1  Left knee pain, unspecified chronicity  M25 562                   Subjective: patient reports no issues after IE      Objective: See treatment diary below      Assessment: Patient tolerated treatment well  Initiated program as noted below  He demonstrates good technique after initial demonstration  He would benefit from continuing physical therapy  Plan: Continue per plan of care        Precautions: HTN      Manuals  5/10           Patellar ROM  SK           TF JM post/ant              L knee ROM  SK                        Neuro Re-Ed             QS  5" x15           Bridges   3" 2x10           S/L clamshells   5" 2x10 ea           SLR abduction, extension   SLR  2x10           Hip 3-way w TB  Yellow  2x10 ea           Mini squats   2x10           [place] step backs nv 10# x15           Ther Ex             Bike   8 min  upright           Heel slides  5" x15                                                                                         Ther Activity                                       Gait Training                                       Modalities

## 2022-05-16 DIAGNOSIS — E79.0 ELEVATED URIC ACID IN BLOOD: ICD-10-CM

## 2022-05-16 RX ORDER — ALLOPURINOL 100 MG/1
200 TABLET ORAL DAILY
Qty: 90 TABLET | Refills: 1 | Status: SHIPPED | OUTPATIENT
Start: 2022-05-16

## 2022-05-17 ENCOUNTER — OFFICE VISIT (OUTPATIENT)
Dept: PHYSICAL THERAPY | Age: 67
End: 2022-05-17
Payer: COMMERCIAL

## 2022-05-17 DIAGNOSIS — M25.562 LEFT KNEE PAIN, UNSPECIFIED CHRONICITY: Primary | ICD-10-CM

## 2022-05-17 PROCEDURE — 97112 NEUROMUSCULAR REEDUCATION: CPT

## 2022-05-17 PROCEDURE — 97140 MANUAL THERAPY 1/> REGIONS: CPT

## 2022-05-17 PROCEDURE — 97110 THERAPEUTIC EXERCISES: CPT

## 2022-05-17 NOTE — PROGRESS NOTES
Daily Note     Today's date: 2022  Patient name: Rosalind Rosenberg  : 1955  MRN: [id number]  Referring provider: Francesca Morris PA-C  Dx:   Encounter Diagnosis     ICD-10-CM    1  Left knee pain, unspecified chronicity  M25 562                   Subjective: pt reports he hasn't tried any of the HEP due to he's sore/tired after daily chores (ie:yardwork), pt reports some relief with topical analgesics and doesn't like to take pain pills for his pain, pt reports pain will wake him at night at times      Objective: See treatment diary below  Pt ed for activity modification to improve HEP compliance      Assessment: pt seems to have good understanding of activity modification, but encouraged HEP compliance to see if there ex helps decrease/change any of  his sx      Plan: Continue per plan of care  Progress treatment as tolerated         Precautions: HTN      Manuals  5/10 5/17/22          Patellar ROM  SK VK          TF JM post/ant              L knee ROM  SK VK                       Neuro Re-Ed             QS  5" x15 20x5"          Bridges   3" 2x10 2x10x5"          S/L clamshells   5" 2x10 ea 2x10x3"          SLR abduction, extension   SLR  2x10 abd/ext 2x10 ea          Hip 3-way w TB  Yellow  2x10 ea nv          Mini squats   2x10 2x10x5"          Maribeth step backs nv 10# x15 15# 10x          Ther Ex             Bike   8 min  upright 8'          Heel slides  5" x15 20x5"                                                                                        Ther Activity                                       Gait Training                                       Modalities

## 2022-05-20 ENCOUNTER — LAB (OUTPATIENT)
Dept: LAB | Age: 67
End: 2022-05-20
Payer: COMMERCIAL

## 2022-05-20 DIAGNOSIS — N18.31 STAGE 3A CHRONIC KIDNEY DISEASE (HCC): ICD-10-CM

## 2022-05-20 DIAGNOSIS — I10 BENIGN ESSENTIAL HYPERTENSION: ICD-10-CM

## 2022-05-20 DIAGNOSIS — R79.89 BLOOD CREATININE INCREASED COMPARED WITH PRIOR MEASUREMENT: ICD-10-CM

## 2022-05-20 LAB
ANION GAP SERPL CALCULATED.3IONS-SCNC: 6 MMOL/L (ref 4–13)
BUN SERPL-MCNC: 17 MG/DL (ref 5–25)
CALCIUM SERPL-MCNC: 9 MG/DL (ref 8.3–10.1)
CHLORIDE SERPL-SCNC: 108 MMOL/L (ref 100–108)
CO2 SERPL-SCNC: 23 MMOL/L (ref 21–32)
CREAT SERPL-MCNC: 1.29 MG/DL (ref 0.6–1.3)
GFR SERPL CREATININE-BSD FRML MDRD: 57 ML/MIN/1.73SQ M
GLUCOSE P FAST SERPL-MCNC: 117 MG/DL (ref 65–99)
POTASSIUM SERPL-SCNC: 4.4 MMOL/L (ref 3.5–5.3)
SODIUM SERPL-SCNC: 137 MMOL/L (ref 136–145)

## 2022-05-20 PROCEDURE — 36415 COLL VENOUS BLD VENIPUNCTURE: CPT

## 2022-05-20 PROCEDURE — 80048 BASIC METABOLIC PNL TOTAL CA: CPT

## 2022-05-20 NOTE — RESULT ENCOUNTER NOTE
Contact patient and inform him that his repeat renal function improved on repeat testing    Thanks Kwan Kinney

## 2022-05-24 ENCOUNTER — OFFICE VISIT (OUTPATIENT)
Dept: PHYSICAL THERAPY | Age: 67
End: 2022-05-24
Payer: COMMERCIAL

## 2022-05-24 DIAGNOSIS — M25.562 LEFT KNEE PAIN, UNSPECIFIED CHRONICITY: Primary | ICD-10-CM

## 2022-05-24 PROCEDURE — 97110 THERAPEUTIC EXERCISES: CPT

## 2022-05-24 PROCEDURE — 97140 MANUAL THERAPY 1/> REGIONS: CPT

## 2022-05-24 PROCEDURE — 97112 NEUROMUSCULAR REEDUCATION: CPT

## 2022-05-24 NOTE — PROGRESS NOTES
Daily Note     Today's date: 2022  Patient name: Alexandrea Matthews  : 1955  MRN: [id number]  Referring provider: Francisco J Wilson PA-C  Dx:   Encounter Diagnosis     ICD-10-CM    1  Left knee pain, unspecified chronicity  M25 562                   Subjective:  Pt reports his L knee feels a little less painful, sleeping better      Objective: See treatment diary below      Assessment: ex progressions challenging but kaden well with mod fatigue and min soreness, L knee ROM gradually improving       Plan: Continue per plan of care  Progress treatment as tolerated         Precautions: HTN      Manuals  5/10 5/17/22 5/24/22         Patellar ROM  SK VK VK         TF JM post/ant              L knee ROM  SK VK VK                      Neuro Re-Ed             QS  5" x15 20x5" [id number]"         Bridges   3" 2x10 2x10x5" 3x10x5"         S/L clamshells   5" 2x10 ea 2x10x3" 3x10x3"         SLR abduction, extension   SLR  2x10 abd/ext 2x10 ea 2x10 ea         Hip 3-way w TB  Yellow  2x10 ea nv ytb 2x10 ea L         Mini squats   2x10 2x10x5" 2x10x5"         Maribeth step backs nv 10# x15 15# 10x 15# 10x         Ther Ex             Bike   8 min  upright 8' 9'         Heel slides  5" x15 20x5" 2x15x5"                                                                                       Ther Activity                                       Gait Training                                       Modalities                                       1:1 treatment

## 2022-05-27 ENCOUNTER — OFFICE VISIT (OUTPATIENT)
Dept: INTERNAL MEDICINE CLINIC | Age: 67
End: 2022-05-27
Payer: COMMERCIAL

## 2022-05-27 VITALS
BODY MASS INDEX: 36.08 KG/M2 | WEIGHT: 252 LBS | HEART RATE: 71 BPM | TEMPERATURE: 98.1 F | DIASTOLIC BLOOD PRESSURE: 78 MMHG | OXYGEN SATURATION: 99 % | SYSTOLIC BLOOD PRESSURE: 126 MMHG | HEIGHT: 70 IN

## 2022-05-27 DIAGNOSIS — E78.00 HYPERCHOLESTEROLEMIA: ICD-10-CM

## 2022-05-27 DIAGNOSIS — Z71.85 VACCINE COUNSELING: ICD-10-CM

## 2022-05-27 DIAGNOSIS — R93.1 ELEVATED CORONARY ARTERY CALCIUM SCORE: ICD-10-CM

## 2022-05-27 DIAGNOSIS — R17 ELEVATED BILIRUBIN: ICD-10-CM

## 2022-05-27 DIAGNOSIS — E66.09 CLASS 2 OBESITY DUE TO EXCESS CALORIES WITHOUT SERIOUS COMORBIDITY WITH BODY MASS INDEX (BMI) OF 36.0 TO 36.9 IN ADULT: ICD-10-CM

## 2022-05-27 DIAGNOSIS — N18.31 STAGE 3A CHRONIC KIDNEY DISEASE (HCC): ICD-10-CM

## 2022-05-27 DIAGNOSIS — K64.8 OTHER HEMORRHOIDS: ICD-10-CM

## 2022-05-27 DIAGNOSIS — M17.12 TRICOMPARTMENT OSTEOARTHRITIS OF LEFT KNEE: ICD-10-CM

## 2022-05-27 DIAGNOSIS — I10 BENIGN ESSENTIAL HYPERTENSION: Primary | ICD-10-CM

## 2022-05-27 PROBLEM — E66.01 OBESITY, MORBID (HCC): Status: RESOLVED | Noted: 2022-04-29 | Resolved: 2022-05-27

## 2022-05-27 PROBLEM — Z63.4 BEREAVEMENT: Status: RESOLVED | Noted: 2020-07-31 | Resolved: 2022-05-27

## 2022-05-27 PROCEDURE — 3078F DIAST BP <80 MM HG: CPT | Performed by: PHYSICIAN ASSISTANT

## 2022-05-27 PROCEDURE — 1160F RVW MEDS BY RX/DR IN RCRD: CPT | Performed by: PHYSICIAN ASSISTANT

## 2022-05-27 PROCEDURE — 99214 OFFICE O/P EST MOD 30 MIN: CPT | Performed by: PHYSICIAN ASSISTANT

## 2022-05-27 PROCEDURE — 3074F SYST BP LT 130 MM HG: CPT | Performed by: PHYSICIAN ASSISTANT

## 2022-05-27 PROCEDURE — 1036F TOBACCO NON-USER: CPT | Performed by: PHYSICIAN ASSISTANT

## 2022-05-27 PROCEDURE — 3008F BODY MASS INDEX DOCD: CPT | Performed by: PHYSICIAN ASSISTANT

## 2022-05-27 RX ORDER — HYDROCORTISONE 25 MG/G
CREAM TOPICAL 2 TIMES DAILY PRN
Qty: 28 G | Refills: 0 | Status: SHIPPED | OUTPATIENT
Start: 2022-05-27

## 2022-05-27 NOTE — ASSESSMENT & PLAN NOTE
Patient currently following with physical therapy and has an appointment scheduled with Orthopedics  Keep our office informed of any change in treatment plan  Discussed treatment options for arthritis including injections physical therapy as well as surgical intervention    Patient plans to discuss these options further with his orthopedic specialist

## 2022-05-27 NOTE — ASSESSMENT & PLAN NOTE
Patient seen and evaluated by Cardiology  Cardiology note reviewed  Cardiology made recommendation for increasing Lipitor to 40 mg daily however patient and cardiologist made agreement to 1st trial on Lipitor 20 mg daily  Patient has not yet made the switch 20 mg daily but plans to do so    Will repeat lipid panel and CMP prior to four-month follow-up

## 2022-05-27 NOTE — ASSESSMENT & PLAN NOTE
Reviewed cardiology note  Agreed with increasing statin dose  Patient will 1st start slow by increasing to 20 mg daily and we will discuss after reviewing his lipid panel further increase in the future  Encouraged weight loss  Encouraged low-fat low-cholesterol diet

## 2022-05-27 NOTE — ASSESSMENT & PLAN NOTE
Discussed importance of increased fiber in the diet  Increase fluids  Avoid straining to move bowels  Topical cream p r n     Discussed Sitz bath  Discussed consideration of colorectal evaluation if symptoms return and are persistent  Patient reports these are not bothering him at this time but will consider this in the future

## 2022-05-27 NOTE — PROGRESS NOTES
[facility] [place]  Standard Office Visit  Patient ID: Cozetta Rosy    : 1955  Age/Gender: 77 y o  male     DATE: 2022      Assessment/Plan:    Vaccine counseling  Recommended COVID booster  Recommended annual flu shot  Prevnar 13 given <  1 year ago  Will need to discuss pneumonia vaccine further at follow up  Elevated bilirubin  Improved on prior labs  Will resolve this issue    Class 2 obesity due to excess calories with body mass index (BMI) of 36 0 to 36 9 in adult  Encouraged weight loss, low impact activity  Diet and lifestyle modifications outlines  Hypercholesterolemia  Patient seen and evaluated by Cardiology  Cardiology note reviewed  Cardiology made recommendation for increasing Lipitor to 40 mg daily however patient and cardiologist made agreement to 1st trial on Lipitor 20 mg daily  Patient has not yet made the switch 20 mg daily but plans to do so  Will repeat lipid panel and CMP prior to four-month follow-up    Stage 3a chronic kidney disease [facility])  Lab Results   Component Value Date    EGFR 57 2022    EGFR 47 2022    EGFR 59 2021    CREATININE 1 29 2022    CREATININE 1 52 (H) 2022    CREATININE 1 26 2021     Reviewed follow-up laboratory studies which patient went for nonfasting which showed improved renal function  Patient has not yet gone for kidney ureter bladder ultrasound and would like to 1st further evaluate his knee which he has appointment with Ortho next week  Agree with holding on ultrasound at this time as his renal function improved on nonfasting labs  Will continue to follow renal function  Tricompartment osteoarthritis of left knee  Patient currently following with physical therapy and has an appointment scheduled with Orthopedics  Keep our office informed of any change in treatment plan    Discussed treatment options for arthritis including injections physical therapy as well as surgical intervention  Patient plans to discuss these options further with his orthopedic specialist     Benign essential hypertension  Blood pressure stable at this time on lisinopril 30 mg daily and Cardura 8 mg daily  No change in medications  Will continue to follow  Other hemorrhoids  Discussed importance of increased fiber in the diet  Increase fluids  Avoid straining to move bowels  Topical cream p r n     Discussed Sitz bath  Discussed consideration of colorectal evaluation if symptoms return and are persistent  Patient reports these are not bothering him at this time but will consider this in the future  Elevated coronary artery calcium score  Reviewed cardiology note  Agreed with increasing statin dose  Patient will 1st start slow by increasing to 20 mg daily and we will discuss after reviewing his lipid panel further increase in the future  Encouraged weight loss  Encouraged low-fat low-cholesterol diet  Diagnoses and all orders for this visit:    Benign essential hypertension  -     Comprehensive metabolic panel; Future  -     CBC and differential; Future    Elevated coronary artery calcium score    Tricompartment osteoarthritis of left knee    Stage 3a chronic kidney disease (HCC)  -     Comprehensive metabolic panel; Future  -     CBC and differential; Future    Hypercholesterolemia  -     Lipid Panel with Direct LDL reflex; Future    Class 2 obesity due to excess calories without serious comorbidity with body mass index (BMI) of 36 0 to 36 9 in adult    Vaccine counseling    Elevated bilirubin    Other hemorrhoids  -     hydrocortisone (ANUSOL-HC) 2 5 % rectal cream; Apply topically 2 (two) times a day as needed for hemorrhoids            Falls Plan of Care: balance, strength, and gait training instructions were provided and referral to physical therapy  Medications that increase falls were reviewed  Previously refer to physical therapy    Continue with PT exercises  Continue with orthopedics  Subjective:   Chief Complaint   Patient presents with    Follow-up     1 month- BW done 5/20/22-         Adan Aguilera is a 77 y o  male who presents to the office on 5/27/2022 for       51-year-old male with history of hypertension, tricompartmental osteoarthritis, dyslipidemia, gout, elevated coronary calcium score obesity presents to the office for chronic condition follow-up  Since last visit he went for his x-rays and started with physical therapy  He is aware of his x-ray results showing arthritis and he was referred orthopedics  He has orthopedics follow-up next week  He continues to have knee pain  He has put a hold on some of his PT stretches and range-of-motion exercises secondary to knee discomfort  Does use Tylenol p r n  Troy Given Also went for follow-up laboratory studies to further evaluate his renal function  He reports that this was the 1st time he ever drink anything prior to his labs  Usually he fasts for his labs and does not drink anything  He has not yet gone for renal ultrasound  He wants to put this on hold if possible because he feels he should get his knees taking care of 1st   He has no knee swelling at this time redness  He reports that he does have some stiffness especially 1st thing when he stands up in the morning  No falls  Taking all medications as directed  Also saw Cardiology regarding elevated coronary calcium score  It was recommended that he increase his Lipitor to 40 mg daily which he was not quite ready to do  He was willing to trying go up to 20 mg of Lipitor daily  He has 10 mg Lipitor tablets at home and has not yet made the change to 20 mg but is agreeable to do so  He reports he is trying to find a low-impact exercise to help with weight loss  Looking into getting an exercise bike  No other complaints or concerns at this time        The following portions of the patient's history were reviewed and updated as appropriate: allergies, current medications, past family history, past medical history, past social history, past surgical history and problem list     Review of Systems   Constitutional: Negative for chills, fever and unexpected weight change  HENT: Negative for rhinorrhea and sore throat  Eyes: Negative for visual disturbance  Respiratory: Negative for cough, shortness of breath and wheezing  Cardiovascular: Negative for chest pain and palpitations  Patient reports he salt cardio and had an EKG  He was told he does not need any follow-up  Gastrointestinal: Negative for abdominal pain, diarrhea, nausea and vomiting  Patient does use topical cream for hemorrhoids as needed  Has had colonoscopy somewhat recently  Denies constipation  Denies hemorrhoid pain at this time   Genitourinary: Negative for hematuria  Musculoskeletal: Positive for arthralgias (Knee pain unchanged  Left greater than right)  Skin: Negative for rash  Neurological: Negative for dizziness and light-headedness  All other systems reviewed and are negative          Patient Active Problem List   Diagnosis    Benign essential hypertension    Hypercholesterolemia    History of gout    Class 2 obesity due to excess calories with body mass index (BMI) of 36 0 to 36 9 in adult    Impaired fasting glucose    Family history of malignant neoplasm of prostate    Other hemorrhoids    Elevated uric acid in blood    Screen for colon cancer    Need for influenza vaccination    Polyp of colon    Hx of colonic polyps    Vaccine counseling    Welcome to Medicare preventive visit    Stage 3a chronic kidney disease ([address] )    Elevated coronary artery calcium score    Tricompartment osteoarthritis of left knee       Past Medical History:   Diagnosis Date    Bereavement 7/31/2020    Blurred vision     Last assessed - 1/28/15    CAP (community acquired pneumonia)     Last assessed - 9/20/16    Elevated bilirubin 11/6/2020    Hemorrhoids     Last assessed - 11/23/15    HTN (hypertension)     Hypercholesterolemia     IFG (impaired fasting glucose)     Last assessed - 11/18/14    Rotator cuff tendinitis     Last assessed - 6/17/15       Past Surgical History:   Procedure Laterality Date    APPENDECTOMY      Resolved - 1/24/09    COLONOSCOPY      2021    WISDOM TOOTH EXTRACTION           Current Outpatient Medications:     allopurinol (ZYLOPRIM) 100 mg tablet, Take 2 tablets (200 mg total) by mouth in the morning , Disp: 90 tablet, Rfl: 1    aspirin 81 MG tablet, Take 1 tablet by mouth daily, Disp: , Rfl:     atorvastatin (LIPITOR) 20 mg tablet, Take 1 tablet (20 mg total) by mouth daily, Disp: 90 tablet, Rfl: 1    doxazosin (CARDURA) 8 MG tablet, Take 1 tablet (8 mg total) by mouth daily, Disp: 90 tablet, Rfl: 1    hydrocortisone (ANUSOL-HC) 2 5 % rectal cream, Apply topically 2 (two) times a day as needed for hemorrhoids, Disp: 28 g, Rfl: 0    lisinopril (ZESTRIL) 30 mg tablet, Take 1 tablet (30 mg total) by mouth daily, Disp: 90 tablet, Rfl: 1    Omega-3 1000 MG CAPS, Take 1 capsule by mouth daily  , Disp: , Rfl:     Allergies   Allergen Reactions    Penicillins Hives    Pollen Extract        Social History     Socioeconomic History    Marital status: /Civil Union     Spouse name: None    Number of children: None    Years of education: None    Highest education level: None   Occupational History    Occupation: [employer]     Employer: [employer]   Tobacco Use    Smoking status: Never Smoker    Smokeless tobacco: Never Used   Vaping Use    Vaping Use: Never used   Substance and Sexual Activity    Alcohol use:  Yes     Alcohol/week: 1 0 standard drink     Types: 1 Cans of beer per week     Comment: 4 weekly    Drug use: No    Sexual activity: Yes   Other Topics Concern    None   Social History Narrative    Travel Hx - Pt denies being out of the country during 1/2014-11/18/2014     Social Determinants of Health     Financial Resource Strain: Not on file   Food Insecurity: Not on file   Transportation Needs: Not on file   Physical Activity: Not on file   Stress: Not on file   Social Connections: Not on file   Intimate Partner Violence: Not on file   Housing Stability: Not on file       Family History   Problem Relation Age of Onset    Hyperlipidemia Mother     Hypertension Mother     Pulmonary embolism Mother     Diabetes type II Mother     Hyperlipidemia Father     Hypertension Father     Prostate cancer Father     Diabetes type II Father        PHQ-2/9 Depression Screening           Health Maintenance   Topic Date Due    DTaP,Tdap,and Td Vaccines (1 - Tdap) 09/02/2007    COVID-19 Vaccine (3 - Booster for Shepard Peter series) 09/09/2021    PT PLAN OF CARE  06/02/2022    Medicare Annual Wellness Visit (AWV)  08/27/2022    Pneumococcal Vaccine: 65+ Years (2 - PPSV23 or PCV20) 08/27/2022    Depression Screening  04/29/2023    BMI: Followup Plan  04/29/2023    Fall Risk  05/03/2023    BMI: Adult  05/27/2023    Colorectal Cancer Screening  07/29/2024    Hepatitis C Screening  Completed    Influenza Vaccine  Completed    HIB Vaccine  Aged Out    Hepatitis B Vaccine  Aged Out    IPV Vaccine  Aged Out    Hepatitis A Vaccine  Aged Out    Meningococcal ACWY Vaccine  Aged Out    HPV Vaccine  Aged Out       Immunization History   Administered Date(s) Administered    COVID-19 PFIZER VACCINE 0 3 ML IM 03/19/2021, 04/09/2021    Influenza Quadrivalent Preservative Free 3 years and older IM 11/29/2017    Influenza Quadrivalent, 6-35 Months IM 11/21/2016    Influenza, high dose seasonal 0 7 mL 11/06/2020, 01/28/2022    Pneumococcal Conjugate 13-Valent 08/27/2021    Td (adult), adsorbed 09/01/2007        Objective:  Vitals:    05/27/22 0813   BP: 126/78   BP Location: Left arm   Patient Position: Sitting   Cuff Size: Large   Pulse: 71   Temp: 98 1 °F (36 7 °C)   TempSrc: Temporal   SpO2: 99% Weight: 114 kg (252 lb)   Height: 5' 10" (1 778 m)     Wt Readings from Last 3 Encounters:   05/27/22 114 kg (252 lb)   05/06/22 115 kg (253 lb 14 4 oz)   04/29/22 116 kg (254 lb 11 2 oz)     Body mass index is 36 16 kg/m²  No exam data present       Physical Exam  Vitals and nursing note reviewed  Constitutional:       General: He is not in acute distress  Appearance: He is well-developed  He is obese  He is not diaphoretic  Comments: Alert pleasant cooperative  Seated in room in no acute distress   HENT:      Head: Normocephalic and atraumatic  Right Ear: Tympanic membrane normal       Left Ear: Tympanic membrane normal       Mouth/Throat:      Mouth: Mucous membranes are moist       Pharynx: No oropharyngeal exudate or posterior oropharyngeal erythema  Eyes:      General: No scleral icterus  Right eye: No discharge  Left eye: No discharge  Pupils: Pupils are equal, round, and reactive to light  Cardiovascular:      Rate and Rhythm: Normal rate and regular rhythm  Heart sounds: Normal heart sounds  No murmur heard  Pulmonary:      Effort: Pulmonary effort is normal  No respiratory distress  Breath sounds: Normal breath sounds  No wheezing or rales  Comments: Clear to auscultation throughout bilaterally  No crackles no rhonchi no wheeze  No respiratory distress  Abdominal:      General: Bowel sounds are normal  There is no distension  Palpations: Abdomen is soft  Tenderness: There is no abdominal tenderness  There is no guarding  Comments: Positive bowel sounds soft nontender nondistended   Musculoskeletal:      Cervical back: Neck supple  Right knee: Deformity and crepitus present  No swelling, effusion, erythema, ecchymosis or bony tenderness  Tenderness present  Left knee: Deformity and crepitus present  No swelling, effusion, erythema, ecchymosis or bony tenderness  Tenderness present  Right lower leg: No edema  Left lower leg: No edema  Lymphadenopathy:      Cervical: No cervical adenopathy  Skin:     General: Skin is warm and dry  Findings: No erythema  Neurological:      General: No focal deficit present  Mental Status: He is alert  Psychiatric:         Mood and Affect: Mood normal          Behavior: Behavior normal          Thought Content: Thought content normal              Future Appointments   Date Time Provider Phyllis Brooks   5/31/2022 10:15 AM All Escudero, PT BE PT [place] [facility]   6/2/2022 10:00 AM Yon Ray DO ORTHO AND [facility]   10/14/2022  7:15 AM Yahir Sykes PA-C [facility]       Yahir Sykes[address]    Patient Care Team:  Yahir Sykes PA-C as PCP - General (Internal Medicine)  Susy Gandhi MD as PCP - [address] [place] [address] (RTE)  [facility]    This note was completed in part utilizing M-Modal Fluency Direct Software  Grammatical errors, random word insertions, spelling mistakes, and incomplete sentences can be an occasional consequence of this system secondary to software limitations, ambient noise, and hardware issues  If you have any questions or concerns about the content, text, or information contained within the body of this dictation, please contact the provider for clarification

## 2022-05-27 NOTE — PATIENT INSTRUCTIONS
Vaccine counseling  Recommended COVID booster  Recommended annual flu shot  Prevnar 13 given <  1 year ago  Will need to discuss pneumonia vaccine further at follow up  Elevated bilirubin  Improved on prior labs  Will resolve this issue    Class 2 obesity due to excess calories with body mass index (BMI) of 36 0 to 36 9 in adult  Encouraged weight loss, low impact activity  Diet and lifestyle modifications outlines  Hypercholesterolemia  Patient seen and evaluated by Cardiology  Cardiology note reviewed  Cardiology made recommendation for increasing Lipitor to 40 mg daily however patient and cardiologist made agreement to 1st trial on Lipitor 20 mg daily  Patient has not yet made the switch 20 mg daily but plans to do so  Will repeat lipid panel and CMP prior to four-month follow-up    Stage 3a chronic kidney disease [facility])  Lab Results   Component Value Date    EGFR 57 05/20/2022    EGFR 47 04/12/2022    EGFR 59 12/29/2021    CREATININE 1 29 05/20/2022    CREATININE 1 52 (H) 04/12/2022    CREATININE 1 26 12/29/2021     Reviewed follow-up laboratory studies which patient went for nonfasting which showed improved renal function  Patient has not yet gone for kidney ureter bladder ultrasound and would like to 1st further evaluate his knee which he has appointment with Ortho next week  Agree with holding on ultrasound at this time as his renal function improved on nonfasting labs  Will continue to follow renal function  Tricompartment osteoarthritis of left knee  Patient currently following with physical therapy and has an appointment scheduled with Orthopedics  Keep our office informed of any change in treatment plan  Discussed treatment options for arthritis including injections physical therapy as well as surgical intervention    Patient plans to discuss these options further with his orthopedic specialist     Benign essential hypertension  Blood pressure stable at this time on lisinopril 30 mg daily and Cardura 8 mg daily  No change in medications  Will continue to follow  Other hemorrhoids  Discussed importance of increased fiber in the diet  Increase fluids  Avoid straining to move bowels  Topical cream p r n     Discussed Sitz bath  Discussed consideration of colorectal evaluation if symptoms return and are persistent  Patient reports these are not bothering him at this time but will consider this in the future  Elevated coronary artery calcium score  Reviewed cardiology note  Agreed with increasing statin dose  Patient will 1st start slow by increasing to 20 mg daily and we will discuss after reviewing his lipid panel further increase in the future  Encouraged weight loss  Encouraged low-fat low-cholesterol diet

## 2022-05-27 NOTE — ASSESSMENT & PLAN NOTE
Blood pressure stable at this time on lisinopril 30 mg daily and Cardura 8 mg daily  No change in medications  Will continue to follow

## 2022-05-27 NOTE — ASSESSMENT & PLAN NOTE
Recommended COVID booster  Recommended annual flu shot  Prevnar 13 given <  1 year ago  Will need to discuss pneumonia vaccine further at follow up

## 2022-05-27 NOTE — ASSESSMENT & PLAN NOTE
Lab Results   Component Value Date    EGFR 57 05/20/2022    EGFR 47 04/12/2022    EGFR 59 12/29/2021    CREATININE 1 29 05/20/2022    CREATININE 1 52 (H) 04/12/2022    CREATININE 1 26 12/29/2021     Reviewed follow-up laboratory studies which patient went for nonfasting which showed improved renal function  Patient has not yet gone for kidney ureter bladder ultrasound and would like to 1st further evaluate his knee which he has appointment with Ortho next week  Agree with holding on ultrasound at this time as his renal function improved on nonfasting labs  Will continue to follow renal function

## 2022-05-31 ENCOUNTER — OFFICE VISIT (OUTPATIENT)
Dept: PHYSICAL THERAPY | Age: 67
End: 2022-05-31
Payer: COMMERCIAL

## 2022-05-31 DIAGNOSIS — M25.562 LEFT KNEE PAIN, UNSPECIFIED CHRONICITY: Primary | ICD-10-CM

## 2022-05-31 PROCEDURE — 97112 NEUROMUSCULAR REEDUCATION: CPT | Performed by: PHYSICAL THERAPIST

## 2022-05-31 PROCEDURE — 97110 THERAPEUTIC EXERCISES: CPT | Performed by: PHYSICAL THERAPIST

## 2022-05-31 PROCEDURE — 97140 MANUAL THERAPY 1/> REGIONS: CPT | Performed by: PHYSICAL THERAPIST

## 2022-05-31 NOTE — PROGRESS NOTES
Daily Note     Today's date: 2022  Patient name: Azar Rosales  : 1955  MRN: [id number]  Referring provider: Bryant Orellana PA-C  Dx:   Encounter Diagnosis     ICD-10-CM    1  Left knee pain, unspecified chronicity  [id number]                   Subjective: Reports limited change from when he started therapy  Objective: See treatment diary below      Assessment: Tolerated treatment well  Patient would benefit from continued PT, did well with exercises today, and will be seeing ortho on Thursday  Plan: Continue per plan of care        Precautions: HTN      Manuals 22         Patellar ROM VK VK CW        TF JM post/ant            L knee ROM VK VK CW                    Neuro Re-Ed           QS 20x5" [id number]" 20x5"         Bridges  2x10x5" 3x10x5" 2x15x3"         S/L clamshells  2x10x3" 3x10x3" 20x3" ea         SLR abduction, extension  abd/ext 2x10 ea 2x10 ea 2x10 ea         Hip 3-way w TB nv ytb 2x10 ea L 20x ea B/L RTB         Mini squats  2x10x5" 2x10x5" 2x10x5"        [employer] step backs 15# 10x 15# 10x         Ther Ex           Bike  8' 9' 10'         Heel slides 20x5" 2x15x5" 15x10"                                                                           Ther Activity                                 Gait Training                                 Modalities                                 1:1 treatment

## 2022-06-02 ENCOUNTER — OFFICE VISIT (OUTPATIENT)
Dept: OBGYN CLINIC | Facility: CLINIC | Age: 67
End: 2022-06-02
Payer: COMMERCIAL

## 2022-06-02 VITALS
SYSTOLIC BLOOD PRESSURE: 136 MMHG | DIASTOLIC BLOOD PRESSURE: 81 MMHG | HEIGHT: 70 IN | HEART RATE: 65 BPM | WEIGHT: 246 LBS | BODY MASS INDEX: 35.22 KG/M2

## 2022-06-02 DIAGNOSIS — M17.12 TRICOMPARTMENT OSTEOARTHRITIS OF LEFT KNEE: ICD-10-CM

## 2022-06-02 PROCEDURE — 1036F TOBACCO NON-USER: CPT | Performed by: ORTHOPAEDIC SURGERY

## 2022-06-02 PROCEDURE — 1160F RVW MEDS BY RX/DR IN RCRD: CPT | Performed by: ORTHOPAEDIC SURGERY

## 2022-06-02 PROCEDURE — 20610 DRAIN/INJ JOINT/BURSA W/O US: CPT | Performed by: ORTHOPAEDIC SURGERY

## 2022-06-02 PROCEDURE — 99204 OFFICE O/P NEW MOD 45 MIN: CPT | Performed by: ORTHOPAEDIC SURGERY

## 2022-06-02 RX ORDER — BETAMETHASONE SODIUM PHOSPHATE AND BETAMETHASONE ACETATE 3; 3 MG/ML; MG/ML
6 INJECTION, SUSPENSION INTRA-ARTICULAR; INTRALESIONAL; INTRAMUSCULAR; SOFT TISSUE
Status: COMPLETED | OUTPATIENT
Start: 2022-06-02 | End: 2022-06-02

## 2022-06-02 RX ORDER — BUPIVACAINE HYDROCHLORIDE 2.5 MG/ML
1 INJECTION, SOLUTION INFILTRATION; PERINEURAL
Status: COMPLETED | OUTPATIENT
Start: 2022-06-02 | End: 2022-06-02

## 2022-06-02 RX ORDER — LIDOCAINE HYDROCHLORIDE 20 MG/ML
1 INJECTION, SOLUTION EPIDURAL; INFILTRATION; INTRACAUDAL; PERINEURAL
Status: COMPLETED | OUTPATIENT
Start: 2022-06-02 | End: 2022-06-02

## 2022-06-02 RX ADMIN — LIDOCAINE HYDROCHLORIDE 1 ML: 20 INJECTION, SOLUTION EPIDURAL; INFILTRATION; INTRACAUDAL; PERINEURAL at 10:20

## 2022-06-02 RX ADMIN — BUPIVACAINE HYDROCHLORIDE 1 ML: 2.5 INJECTION, SOLUTION INFILTRATION; PERINEURAL at 10:20

## 2022-06-02 RX ADMIN — BETAMETHASONE SODIUM PHOSPHATE AND BETAMETHASONE ACETATE 6 MG: 3; 3 INJECTION, SUSPENSION INTRA-ARTICULAR; INTRALESIONAL; INTRAMUSCULAR; SOFT TISSUE at 10:20

## 2022-06-02 NOTE — PROGRESS NOTES
Assessment:  1  Tricompartment osteoarthritis of left knee  Ambulatory Referral to Orthopedic Surgery    Injection Procedure Prior Authorization    Medial  Knee Brace     Patient Active Problem List   Diagnosis    Benign essential hypertension    Hypercholesterolemia    History of gout    Class 2 obesity due to excess calories with body mass index (BMI) of 36 0 to 36 9 in adult    Impaired fasting glucose    Family history of malignant neoplasm of prostate    Other hemorrhoids    Elevated uric acid in blood    Screen for colon cancer    Need for influenza vaccination    Polyp of colon    Hx of colonic polyps    Vaccine counseling    Welcome to Medicare preventive visit    Stage 3a chronic kidney disease ([address] )    Elevated coronary artery calcium score    Tricompartment osteoarthritis of left knee           Plan      77 y o  male with left knee osteoarthritis and medial joint space narrowing  Diagnosis and treatment options were discussed with the patient and he was given the opportunity to ask questions  - Corticosteroid injection accepted and given  Procedure tolerated well, post injection protocol advised  Weightbearing as tolerated  - Medial  brace given and directed upon  - Lubricant injection authorization submitted  Patient will follow up once injections are in    - The patient is lacking the last few degrees of extension, I discussed with the patient that continuing physical therapy to achieve full extension is important in regards to possible future surgical intervention  Subjective:     Patient ID:    Chief Complaint:Carter Grover 77 y o  male left knee pain  Patient states he has been having pain in the medial joint line of his left knee for few months  His PCP referred him to physical therapy which she states is not really helping  He states the pain can be a 10/10 at its worst, even waking him up at night    He describes it as a general ache that will be severe at times  He has not tried injections, bracing or pain medication  HPI    Patient comes in today with regards to left knee pain  The patient reports that the pain is in the medial joint space and has been going on for a few months  The pain is rated at 2 at its best and 10 at its worst   The pain is described as ache but sharp at times  It is worsened with extension, and is made better with rest      The following portions of the patient's history were reviewed and updated as appropriate: allergies, current medications, past family history, past social history, past surgical history and problem list         Social History     Socioeconomic History    Marital status: /Civil Union     Spouse name: Not on file    Number of children: Not on file    Years of education: Not on file    Highest education level: Not on file   Occupational History    Occupation: Adhysteria     Employer: [employer]   Tobacco Use    Smoking status: Never Smoker    Smokeless tobacco: Never Used   Vaping Use    Vaping Use: Never used   Substance and Sexual Activity    Alcohol use:  Yes     Alcohol/week: 1 0 standard drink     Types: 1 Cans of beer per week     Comment: 4 weekly    Drug use: No    Sexual activity: Yes   Other Topics Concern    Not on file   Social History Narrative    Travel Hx - Pt denies being out of the country during 1/2014-11/18/2014     Social Determinants of Health     Financial Resource Strain: Not on file   Food Insecurity: Not on file   Transportation Needs: Not on file   Physical Activity: Not on file   Stress: Not on file   Social Connections: Not on file   Intimate Partner Violence: Not on file   Housing Stability: Not on file     Past Medical History:   Diagnosis Date    Bereavement 7/31/2020    Blurred vision     Last assessed - 1/28/15    CAP (community acquired pneumonia)     Last assessed - 9/20/16    Elevated bilirubin 11/6/2020    Hemorrhoids     Last assessed - 11/23/15  HTN (hypertension)     Hypercholesterolemia     IFG (impaired fasting glucose)     Last assessed - 11/18/14    Rotator cuff tendinitis     Last assessed - 6/17/15     Past Surgical History:   Procedure Laterality Date    APPENDECTOMY      Resolved - 1/24/09    COLONOSCOPY      2021    WISDOM TOOTH EXTRACTION       Allergies   Allergen Reactions    Penicillins Hives    Pollen Extract      Current Outpatient Medications on File Prior to Visit   Medication Sig Dispense Refill    allopurinol (ZYLOPRIM) 100 mg tablet Take 2 tablets (200 mg total) by mouth in the morning  90 tablet 1    aspirin 81 MG tablet Take 1 tablet by mouth daily      atorvastatin (LIPITOR) 20 mg tablet Take 1 tablet (20 mg total) by mouth daily 90 tablet 1    doxazosin (CARDURA) 8 MG tablet Take 1 tablet (8 mg total) by mouth daily 90 tablet 1    hydrocortisone (ANUSOL-HC) 2 5 % rectal cream Apply topically 2 (two) times a day as needed for hemorrhoids 28 g 0    lisinopril (ZESTRIL) 30 mg tablet Take 1 tablet (30 mg total) by mouth daily 90 tablet 1    Omega-3 1000 MG CAPS Take 1 capsule by mouth daily         No current facility-administered medications on file prior to visit  Objective:    Review of Systems   Constitutional: Negative for chills and fever  HENT: Negative for ear pain and sore throat  Eyes: Negative for pain and visual disturbance  Respiratory: Negative for cough and shortness of breath  Cardiovascular: Negative for chest pain and palpitations  Gastrointestinal: Negative for abdominal pain and vomiting  Genitourinary: Negative for dysuria and hematuria  Musculoskeletal: Negative for arthralgias and back pain  Skin: Negative for color change and rash  Neurological: Negative for seizures and syncope  All other systems reviewed and are negative  Left Knee Exam     Tenderness   The patient is experiencing tenderness in the medial joint line      Range of Motion   Left knee extension: lacks last 5 degrees    Flexion: normal     Tests   Aileen:  Medial - positive     Other   Erythema: absent  Sensation: normal  Swelling: none  Effusion: no effusion present    Comments:  (-) bounce test   (-) Thesaly's     5/5 in flexion and extension                 Physical Exam  Constitutional:       Appearance: Normal appearance  Eyes:      Pupils: Pupils are equal, round, and reactive to light  Cardiovascular:      Pulses: Normal pulses  Musculoskeletal:         General: Normal range of motion  Left knee: No effusion  Instability Tests: Medial Aileen test positive  Neurological:      Mental Status: He is alert  Large joint arthrocentesis: L knee  Universal Protocol:  Consent: Verbal consent obtained  Risks and benefits: risks, benefits and alternatives were discussed  Consent given by: patient  Patient understanding: patient states understanding of the procedure being performed  Patient identity confirmed: verbally with patient    Supporting Documentation  Indications: pain and diagnostic evaluation   Procedure Details  Location: knee - L knee  Preparation: Patient was prepped and draped in the usual sterile fashion  Needle size: 22 G  Ultrasound guidance: no  Medications administered: 1 mL bupivacaine 0 25 %; 6 mg betamethasone acetate-betamethasone sodium phosphate 6 (3-3) mg/mL; 1 mL lidocaine (PF) 2 %    Patient tolerance: patient tolerated the procedure well with no immediate complications  Dressing:  Sterile dressing applied             I have personally reviewed pertinent films in PACS and my interpretation is tricompartmental osteoarthritis most severely affecting the medial compartment, mild to moderate elsewhere         Scribe Attestation    I,:  Elizabeth Boland am acting as a scribe while in the presence of the attending physician :       I,:  Wade Courtney DO personally performed the services described in this documentation    as scribed in my presence : Portions of the record may have been created with voice recognition software   Occasional wrong word or "sound a like" substitutions may have occurred due to the inherent limitations of voice recognition software   Read the chart carefully and recognize, using context, where substitutions have occurred

## 2022-06-23 ENCOUNTER — PROCEDURE VISIT (OUTPATIENT)
Dept: OBGYN CLINIC | Facility: CLINIC | Age: 67
End: 2022-06-23
Payer: COMMERCIAL

## 2022-06-23 VITALS
SYSTOLIC BLOOD PRESSURE: 135 MMHG | DIASTOLIC BLOOD PRESSURE: 75 MMHG | BODY MASS INDEX: 35.22 KG/M2 | HEIGHT: 70 IN | WEIGHT: 246 LBS | HEART RATE: 69 BPM

## 2022-06-23 DIAGNOSIS — M17.12 TRICOMPARTMENT OSTEOARTHRITIS OF LEFT KNEE: Primary | ICD-10-CM

## 2022-06-23 PROCEDURE — 20610 DRAIN/INJ JOINT/BURSA W/O US: CPT | Performed by: PHYSICIAN ASSISTANT

## 2022-06-23 RX ORDER — HYALURONATE SODIUM 10 MG/ML
20 SYRINGE (ML) INTRAARTICULAR
Status: COMPLETED | OUTPATIENT
Start: 2022-06-23 | End: 2022-06-23

## 2022-06-23 RX ADMIN — Medication 20 MG: at 09:59

## 2022-06-23 NOTE — PROGRESS NOTES
1  Tricompartment osteoarthritis of left knee  Large joint arthrocentesis: L knee     Patient is here for his 1st injection of Euflexxa into the left knee  Patient reports improvement after cortisone injection  Physical exam of the knee shows no effusion no ecchymosis  All other organ systems normal    Large joint arthrocentesis: L knee  Universal Protocol:  Consent given by: patient  Time out: Immediately prior to procedure a "time out" was called to verify the correct patient, procedure, equipment, support staff and site/side marked as required    Site marked: the operative site was marked  Supporting Documentation  Indications: pain   Procedure Details  Location: knee - L knee  Preparation: Patient was prepped and draped in the usual sterile fashion  Needle size: 22 G  Ultrasound guidance: no  Approach: anterolateral  Medications administered: 20 mg Sodium Hyaluronate 20 MG/2ML    Patient tolerance: patient tolerated the procedure well with no immediate complications  Dressing:  Sterile dressing applied          Patient tolerated procedure follow up 1 week

## 2022-06-30 ENCOUNTER — PROCEDURE VISIT (OUTPATIENT)
Dept: OBGYN CLINIC | Facility: CLINIC | Age: 67
End: 2022-06-30
Payer: COMMERCIAL

## 2022-06-30 VITALS
HEART RATE: 80 BPM | SYSTOLIC BLOOD PRESSURE: 134 MMHG | HEIGHT: 70 IN | BODY MASS INDEX: 35.22 KG/M2 | DIASTOLIC BLOOD PRESSURE: 74 MMHG | WEIGHT: 246 LBS

## 2022-06-30 DIAGNOSIS — M17.12 TRICOMPARTMENT OSTEOARTHRITIS OF LEFT KNEE: Primary | ICD-10-CM

## 2022-06-30 PROCEDURE — 20610 DRAIN/INJ JOINT/BURSA W/O US: CPT | Performed by: PHYSICIAN ASSISTANT

## 2022-06-30 PROCEDURE — 3008F BODY MASS INDEX DOCD: CPT | Performed by: ORTHOPAEDIC SURGERY

## 2022-06-30 RX ORDER — HYALURONATE SODIUM 10 MG/ML
20 SYRINGE (ML) INTRAARTICULAR
Status: COMPLETED | OUTPATIENT
Start: 2022-06-30 | End: 2022-06-30

## 2022-06-30 RX ADMIN — Medication 20 MG: at 10:31

## 2022-06-30 NOTE — PROGRESS NOTES
1  Tricompartment osteoarthritis of left knee  Large joint arthrocentesis: L knee     Patient is here for his 2nd injection of Euflexxa into the left knee  Patient reports improvements  Physical exam of the knee shows no effusion no ecchymosis  All other organ systems normal    Large joint arthrocentesis: L knee  Universal Protocol:  Consent given by: patient  Time out: Immediately prior to procedure a "time out" was called to verify the correct patient, procedure, equipment, support staff and site/side marked as required    Site marked: the operative site was marked  Supporting Documentation  Indications: pain   Procedure Details  Location: knee - L knee  Preparation: Patient was prepped and draped in the usual sterile fashion  Needle size: 22 G  Ultrasound guidance: no  Approach: anterolateral  Medications administered: 20 mg Sodium Hyaluronate 20 MG/2ML    Patient tolerance: patient tolerated the procedure well with no immediate complications  Dressing:  Sterile dressing applied          Patient tolerated procedure follow up 1 week

## 2022-07-07 ENCOUNTER — PROCEDURE VISIT (OUTPATIENT)
Dept: OBGYN CLINIC | Facility: CLINIC | Age: 67
End: 2022-07-07
Payer: COMMERCIAL

## 2022-07-07 VITALS
BODY MASS INDEX: 35.22 KG/M2 | HEIGHT: 70 IN | DIASTOLIC BLOOD PRESSURE: 70 MMHG | HEART RATE: 74 BPM | WEIGHT: 246 LBS | SYSTOLIC BLOOD PRESSURE: 123 MMHG

## 2022-07-07 DIAGNOSIS — M17.12 TRICOMPARTMENT OSTEOARTHRITIS OF LEFT KNEE: Primary | ICD-10-CM

## 2022-07-07 PROCEDURE — 20610 DRAIN/INJ JOINT/BURSA W/O US: CPT | Performed by: ORTHOPAEDIC SURGERY

## 2022-07-07 RX ORDER — HYALURONATE SODIUM 10 MG/ML
20 SYRINGE (ML) INTRAARTICULAR
Status: COMPLETED | OUTPATIENT
Start: 2022-07-07 | End: 2022-07-07

## 2022-07-07 RX ADMIN — Medication 20 MG: at 09:23

## 2022-07-07 NOTE — PROGRESS NOTES
1  Tricompartment osteoarthritis of left knee       Patient is here for his 3rd injection of Euflexxa into the left knee  Patient reportsimprovements  All organ systems normal  Physical exam of the knee shows no effusion no ecchymosis  Large joint arthrocentesis: L knee  Universal Protocol:  Consent given by: patient  Time out: Immediately prior to procedure a "time out" was called to verify the correct patient, procedure, equipment, support staff and site/side marked as required  Supporting Documentation  Indications: pain   Procedure Details  Location: knee - L knee  Needle size: 22 G  Ultrasound guidance: no  Approach: anterolateral  Medications administered: 20 mg Sodium Hyaluronate 20 MG/2ML    Patient tolerance: patient tolerated the procedure well with no immediate complications          Patient tolerated procedure follow up p r n

## 2022-08-18 DIAGNOSIS — E79.0 ELEVATED URIC ACID IN BLOOD: ICD-10-CM

## 2022-08-18 RX ORDER — ALLOPURINOL 100 MG/1
200 TABLET ORAL DAILY
Qty: 180 TABLET | Refills: 1 | Status: SHIPPED | OUTPATIENT
Start: 2022-08-18

## 2022-09-06 DIAGNOSIS — I10 BENIGN ESSENTIAL HYPERTENSION: ICD-10-CM

## 2022-09-06 RX ORDER — DOXAZOSIN 8 MG/1
8 TABLET ORAL DAILY
Qty: 90 TABLET | Refills: 1 | Status: SHIPPED | OUTPATIENT
Start: 2022-09-06

## 2022-09-28 DIAGNOSIS — I10 BENIGN ESSENTIAL HYPERTENSION: ICD-10-CM

## 2022-09-28 RX ORDER — LISINOPRIL 30 MG/1
30 TABLET ORAL DAILY
Qty: 90 TABLET | Refills: 1 | Status: SHIPPED | OUTPATIENT
Start: 2022-09-28

## 2022-10-10 ENCOUNTER — LAB (OUTPATIENT)
Dept: LAB | Facility: MEDICAL CENTER | Age: 67
End: 2022-10-10
Payer: COMMERCIAL

## 2022-10-10 DIAGNOSIS — N18.31 STAGE 3A CHRONIC KIDNEY DISEASE (HCC): ICD-10-CM

## 2022-10-10 DIAGNOSIS — I10 BENIGN ESSENTIAL HYPERTENSION: ICD-10-CM

## 2022-10-10 DIAGNOSIS — E78.00 HYPERCHOLESTEROLEMIA: ICD-10-CM

## 2022-10-10 LAB
ALBUMIN SERPL BCP-MCNC: 3.6 G/DL (ref 3.5–5)
ALP SERPL-CCNC: 113 U/L (ref 46–116)
ALT SERPL W P-5'-P-CCNC: 34 U/L (ref 12–78)
ANION GAP SERPL CALCULATED.3IONS-SCNC: 3 MMOL/L (ref 4–13)
AST SERPL W P-5'-P-CCNC: 19 U/L (ref 5–45)
BASOPHILS # BLD AUTO: 0.05 THOUSANDS/ΜL (ref 0–0.1)
BASOPHILS NFR BLD AUTO: 1 % (ref 0–1)
BILIRUB SERPL-MCNC: 1.34 MG/DL (ref 0.2–1)
BUN SERPL-MCNC: 13 MG/DL (ref 5–25)
CALCIUM SERPL-MCNC: 9.1 MG/DL (ref 8.3–10.1)
CHLORIDE SERPL-SCNC: 109 MMOL/L (ref 96–108)
CHOLEST SERPL-MCNC: 104 MG/DL
CO2 SERPL-SCNC: 25 MMOL/L (ref 21–32)
CREAT SERPL-MCNC: 1.27 MG/DL (ref 0.6–1.3)
EOSINOPHIL # BLD AUTO: 0.47 THOUSAND/ΜL (ref 0–0.61)
EOSINOPHIL NFR BLD AUTO: 8 % (ref 0–6)
ERYTHROCYTE [DISTWIDTH] IN BLOOD BY AUTOMATED COUNT: 13.2 % (ref 11.6–15.1)
GFR SERPL CREATININE-BSD FRML MDRD: 58 ML/MIN/1.73SQ M
GLUCOSE P FAST SERPL-MCNC: 110 MG/DL (ref 65–99)
HCT VFR BLD AUTO: 44.1 % (ref 36.5–49.3)
HDLC SERPL-MCNC: 36 MG/DL
HGB BLD-MCNC: 14.3 G/DL (ref 12–17)
IMM GRANULOCYTES # BLD AUTO: 0.01 THOUSAND/UL (ref 0–0.2)
IMM GRANULOCYTES NFR BLD AUTO: 0 % (ref 0–2)
LDLC SERPL CALC-MCNC: 44 MG/DL (ref 0–100)
LYMPHOCYTES # BLD AUTO: 1.73 THOUSANDS/ΜL (ref 0.6–4.47)
LYMPHOCYTES NFR BLD AUTO: 28 % (ref 14–44)
MCH RBC QN AUTO: 31.3 PG (ref 26.8–34.3)
MCHC RBC AUTO-ENTMCNC: 32.4 G/DL (ref 31.4–37.4)
MCV RBC AUTO: 97 FL (ref 82–98)
MONOCYTES # BLD AUTO: 0.56 THOUSAND/ΜL (ref 0.17–1.22)
MONOCYTES NFR BLD AUTO: 9 % (ref 4–12)
NEUTROPHILS # BLD AUTO: 3.33 THOUSANDS/ΜL (ref 1.85–7.62)
NEUTS SEG NFR BLD AUTO: 54 % (ref 43–75)
NRBC BLD AUTO-RTO: 0 /100 WBCS
PLATELET # BLD AUTO: 188 THOUSANDS/UL (ref 149–390)
PMV BLD AUTO: 11.7 FL (ref 8.9–12.7)
POTASSIUM SERPL-SCNC: 5 MMOL/L (ref 3.5–5.3)
PROT SERPL-MCNC: 7.1 G/DL (ref 6.4–8.4)
RBC # BLD AUTO: 4.57 MILLION/UL (ref 3.88–5.62)
SODIUM SERPL-SCNC: 137 MMOL/L (ref 135–147)
TRIGL SERPL-MCNC: 121 MG/DL
WBC # BLD AUTO: 6.15 THOUSAND/UL (ref 4.31–10.16)

## 2022-10-10 PROCEDURE — 80053 COMPREHEN METABOLIC PANEL: CPT

## 2022-10-10 PROCEDURE — 36415 COLL VENOUS BLD VENIPUNCTURE: CPT

## 2022-10-10 PROCEDURE — 80061 LIPID PANEL: CPT

## 2022-10-10 PROCEDURE — 85025 COMPLETE CBC W/AUTO DIFF WBC: CPT

## 2022-10-12 PROBLEM — Z00.00 WELCOME TO MEDICARE PREVENTIVE VISIT: Status: RESOLVED | Noted: 2021-08-27 | Resolved: 2022-10-12

## 2022-10-14 ENCOUNTER — OFFICE VISIT (OUTPATIENT)
Dept: INTERNAL MEDICINE CLINIC | Facility: OTHER | Age: 67
End: 2022-10-14
Payer: COMMERCIAL

## 2022-10-14 VITALS
SYSTOLIC BLOOD PRESSURE: 128 MMHG | WEIGHT: 254 LBS | DIASTOLIC BLOOD PRESSURE: 74 MMHG | HEART RATE: 70 BPM | HEIGHT: 70 IN | TEMPERATURE: 98.7 F | OXYGEN SATURATION: 97 % | BODY MASS INDEX: 36.36 KG/M2

## 2022-10-14 DIAGNOSIS — R73.01 IMPAIRED FASTING GLUCOSE: ICD-10-CM

## 2022-10-14 DIAGNOSIS — Z71.85 VACCINE COUNSELING: ICD-10-CM

## 2022-10-14 DIAGNOSIS — Z00.00 MEDICARE ANNUAL WELLNESS VISIT, SUBSEQUENT: ICD-10-CM

## 2022-10-14 DIAGNOSIS — N18.31 STAGE 3A CHRONIC KIDNEY DISEASE (HCC): ICD-10-CM

## 2022-10-14 DIAGNOSIS — Z87.39 HISTORY OF GOUT: ICD-10-CM

## 2022-10-14 DIAGNOSIS — E78.00 HYPERCHOLESTEROLEMIA: ICD-10-CM

## 2022-10-14 DIAGNOSIS — Z23 NEEDS FLU SHOT: Primary | ICD-10-CM

## 2022-10-14 DIAGNOSIS — Z13.6 ENCOUNTER FOR ABDOMINAL AORTIC ANEURYSM (AAA) SCREENING: ICD-10-CM

## 2022-10-14 DIAGNOSIS — I10 BENIGN ESSENTIAL HYPERTENSION: ICD-10-CM

## 2022-10-14 DIAGNOSIS — R93.1 ELEVATED CORONARY ARTERY CALCIUM SCORE: ICD-10-CM

## 2022-10-14 PROCEDURE — G0008 ADMIN INFLUENZA VIRUS VAC: HCPCS | Performed by: PHYSICIAN ASSISTANT

## 2022-10-14 PROCEDURE — 90662 IIV NO PRSV INCREASED AG IM: CPT | Performed by: PHYSICIAN ASSISTANT

## 2022-10-14 PROCEDURE — 99213 OFFICE O/P EST LOW 20 MIN: CPT | Performed by: PHYSICIAN ASSISTANT

## 2022-10-14 PROCEDURE — G0439 PPPS, SUBSEQ VISIT: HCPCS | Performed by: PHYSICIAN ASSISTANT

## 2022-10-14 NOTE — PROGRESS NOTES
Assessment and Plan:     Problem List Items Addressed This Visit        Endocrine    Impaired fasting glucose     Give encouragement with low carbohydrate diet and regular daily activity  Encouraged weight loss  Will check hemoglobin A1c with next lab set  Glucose relatively stable  Relevant Orders    HEMOGLOBIN A1C W/ EAG ESTIMATION       Cardiovascular and Mediastinum    Benign essential hypertension     Blood pressure stable at this time on lisinopril 30 mg daily and doxazosin 8 mg daily  And courage daily activity  Encourage fluid intake  Discussed importance of gradual change in positioning due to the orthostatic risk with doxazosin  Relevant Orders    Comprehensive metabolic panel    Lipid Panel with Direct LDL reflex    CBC and differential    Elevated coronary artery calcium score     Commended patient on improved cholesterol  Continue Lipitor 20 mg daily along with fish oil  Genitourinary    Stage 3a chronic kidney disease [facility])     Lab Results   Component Value Date    EGFR 58 10/10/2022    EGFR 57 05/20/2022    EGFR 47 04/12/2022    CREATININE 1 27 10/10/2022    CREATININE 1 29 05/20/2022    CREATININE 1 52 (H) 04/12/2022     Renal function stable at this time  Will repeat uric acid level with next lab set  Continue allopurinol  No recent gouty flares  Relevant Orders    Comprehensive metabolic panel    CBC and differential       Other    Hypercholesterolemia     Stable at this time on statin  Relevant Orders    Lipid Panel with Direct LDL reflex    History of gout     Will check uric acid level with next lab set  Continue allopurinol  No recent gout flares  Relevant Orders    Uric acid    Vaccine counseling     Recommend shingles vaccine, pneumonia vaccine and COVID booster (bivalent)  Patient will consider  Medicare annual wellness visit, subsequent     Healthy adult male    Continue with GI regarding ongoing colon cancer screening  Patient has had colonoscopy in the past   No medication changes at this time  Recommended regular eye doctor appointment  Continue follow with dentist   Vaccines discussed  Labs prior to 6 month follow-up  Other Visit Diagnoses     Needs flu shot    -  Primary    Relevant Orders    influenza vaccine, high-dose, PF 0 7 mL (FLUZONE HIGH-DOSE) (Completed)    Encounter for abdominal aortic aneurysm (AAA) screening        Recommend AAA screen, order given    Relevant Orders    US abdominal aorta screening aaa        BMI Counseling: Body mass index is 36 45 kg/m²  The BMI is above normal  Nutrition recommendations include limiting drinks that contain sugar, moderation in carbohydrate intake, increasing intake of lean protein and reducing intake of cholesterol  Exercise recommendations include exercising 3-5 times per week  Rationale for BMI follow-up plan is due to patient being overweight or obese  Depression Screening and Follow-up Plan: Patient was screened for depression during today's encounter  They screened negative with a PHQ-2 score of 0  Falls Plan of Care: balance, strength, and gait training instructions were provided  Medications that increase falls were reviewed  Home safety education provided  Preventive health issues were discussed with patient, and age appropriate screening tests were ordered as noted in patient's After Visit Summary  Personalized health advice and appropriate referrals for health education or preventive services given if needed, as noted in patient's After Visit Summary  History of Present Illness:     Patient presents for a Medicare Wellness Visit    60-year-old male with a history of impaired fasting glucose, hypertension, remote history of gout, high cholesterol presents to office for chronic condition follow-up as well as annual wellness visit  No current complaints or concerns at this time    Following with orthopedic specialist and has been giving him injection series in his L knee  Has seen some improvement  NO longer has night pain  Pain is more come and go as of late  Pain is bearable but much better  No redness  NO swelling  No impact on ADLs  No CP or palps  No chest discomfort  Notes he was following BP before but since it improved he has not been checking it late  Had flu shot today  Completed initial COVID series, has not had boosters  Taking allopurinol as directed  No recent gout flare  Patient feels good on his current blood pressure medications  Not interested in making any adjustments at this time  He is very careful with change in position due to our previous discussion about orthostatic precautions     Patient Care Team:  Spike Jefferson PA-C as PCP - General (Internal Medicine)  Hayes Golden MD as PCP - [address] [place] [address] (RTE)  Hayes Golden MD as PCP - PCP-[facility] (RTE)     Review of Systems:     Review of Systems   Constitutional: Negative for chills, fever and unexpected weight change  HENT: Negative for rhinorrhea and sore throat  Respiratory: Negative for cough, chest tightness, shortness of breath and wheezing  Cardiovascular: Negative for chest pain and palpitations  Gastrointestinal: Negative for abdominal pain, anal bleeding, blood in stool, constipation, diarrhea, nausea and vomiting  Genitourinary: Negative for dysuria and frequency  Musculoskeletal:        Knee pains as noted in HPI   Skin: Negative for rash  Neurological: Negative for dizziness and light-headedness          Problem List:     Patient Active Problem List   Diagnosis   • Benign essential hypertension   • Hypercholesterolemia   • History of gout   • Class 2 obesity due to excess calories with body mass index (BMI) of 36 0 to 36 9 in adult   • Impaired fasting glucose   • Family history of malignant neoplasm of prostate   • Other hemorrhoids   • Elevated uric acid in blood   • Screen for colon cancer   • Need for influenza vaccination   • Polyp of colon   • Hx of colonic polyps   • Vaccine counseling   • Stage 3a chronic kidney disease ([facility] [address] )   • Elevated coronary artery calcium score   • Tricompartment osteoarthritis of left knee   • Medicare annual wellness visit, subsequent      Past Medical and Surgical History:     Past Medical History:   Diagnosis Date   • Bereavement 7/31/2020   • Blurred vision     Last assessed - 1/28/15   • CAP (community acquired pneumonia)     Last assessed - 9/20/16   • Elevated bilirubin 11/6/2020   • Hemorrhoids     Last assessed - 11/23/15   • HTN (hypertension)    • Hypercholesterolemia    • IFG (impaired fasting glucose)     Last assessed - 11/18/14   • Rotator cuff tendinitis     Last assessed - 6/17/15     Past Surgical History:   Procedure Laterality Date   • APPENDECTOMY      Resolved - 1/24/09   • COLONOSCOPY      2021   • WISDOM TOOTH EXTRACTION        Family History:     Family History   Problem Relation Age of Onset   • Hyperlipidemia Mother    • Hypertension Mother    • Pulmonary embolism Mother    • Diabetes type II Mother    • Hyperlipidemia Father    • Hypertension Father    • Prostate cancer Father    • Diabetes type II Father       Social History:     Social History     Socioeconomic History   • Marital status:      Spouse name: None   • Number of children: None   • Years of education: None   • Highest education level: None   Occupational History   • Occupation: [employer]     Employer: [employer]   Tobacco Use   • Smoking status: Never Smoker   • Smokeless tobacco: Never Used   Vaping Use   • Vaping Use: Never used   Substance and Sexual Activity   • Alcohol use:  Yes     Alcohol/week: 1 0 standard drink     Types: 1 Cans of beer per week     Comment: 4 weekly   • Drug use: No   • Sexual activity: Yes   Other Topics Concern   • None   Social History Narrative    Travel Hx - Pt denies being out of the country during 1/2014-11/18/2014     Social Determinants of Health Financial Resource Strain: Low Risk    • Difficulty of Paying Living Expenses: Not hard at all   Food Insecurity: Not on file   Transportation Needs: No Transportation Needs   • Lack of Transportation (Medical): No   • Lack of Transportation (Non-Medical): No   Physical Activity: Not on file   Stress: Not on file   Social Connections: Not on file   Intimate Partner Violence: Not on file   Housing Stability: Not on file      Medications and Allergies:     Current Outpatient Medications   Medication Sig Dispense Refill   • allopurinol (ZYLOPRIM) 100 mg tablet Take 2 tablets (200 mg total) by mouth daily 180 tablet 1   • aspirin 81 MG tablet Take 1 tablet by mouth daily     • atorvastatin (LIPITOR) 20 mg tablet Take 1 tablet (20 mg total) by mouth daily 90 tablet 1   • doxazosin (CARDURA) 8 MG tablet Take 1 tablet (8 mg total) by mouth daily 90 tablet 1   • hydrocortisone (ANUSOL-HC) 2 5 % rectal cream Apply topically 2 (two) times a day as needed for hemorrhoids 28 g 0   • lisinopril (ZESTRIL) 30 mg tablet Take 1 tablet (30 mg total) by mouth daily 90 tablet 1   • Omega-3 1000 MG CAPS Take 1 capsule by mouth daily         No current facility-administered medications for this visit       Allergies   Allergen Reactions   • Penicillins Hives   • Pollen Extract       Immunizations:     Immunization History   Administered Date(s) Administered   • COVID-19 PFIZER VACCINE 0 3 ML IM 03/19/2021, 04/09/2021   • Influenza Quadrivalent Preservative Free 3 years and older IM 11/29/2017   • Influenza Quadrivalent, 6-35 Months IM 11/21/2016   • Influenza, high dose seasonal 0 7 mL 11/06/2020, 01/28/2022, 10/14/2022   • Pneumococcal Conjugate 13-Valent 08/27/2021   • Td (adult), adsorbed 09/01/2007      Health Maintenance:         Topic Date Due   • Colorectal Cancer Screening  07/29/2024   • Hepatitis C Screening  Completed         Topic Date Due   • COVID-19 Vaccine (3 - Booster for Pfizer series) 09/09/2021   • Pneumococcal Vaccine: 65+ Years (2 - PPSV23 or PCV20) 08/27/2022      Medicare Screening Tests and Risk Assessments:     Lowella Boeck is here for his Subsequent Wellness visit  Last Medicare Wellness visit information reviewed, patient interviewed, no change since last AWV  Health Risk Assessment:   Patient rates overall health as very good  Patient feels that their physical health rating is same  Patient is satisfied with their life  Eyesight was rated as same  Hearing was rated as same  Patient feels that their emotional and mental health rating is same  Patients states they are never, rarely angry  Patient states they are never, rarely unusually tired/fatigued  Pain experienced in the last 7 days has been none  Patient states that he has experienced no weight loss or gain in last 6 months  Depression Screening:   PHQ-2 Score: 0      Fall Risk Screening: In the past year, patient has experienced: no history of falling in past year      Home Safety:  Patient does not have trouble with stairs inside or outside of their home  Patient has working smoke alarms and has working carbon monoxide detector  Home safety hazards include: none  Nutrition:   Current diet is Regular  Medications:   Patient is currently taking over-the-counter supplements  OTC medications include: see medication list  Patient is able to manage medications  Activities of Daily Living (ADLs)/Instrumental Activities of Daily Living (IADLs):   Walk and transfer into and out of bed and chair?: Yes  Dress and groom yourself?: Yes    Bathe or shower yourself?: Yes    Feed yourself?  Yes  Do your laundry/housekeeping?: Yes  Manage your money, pay your bills and track your expenses?: Yes  Make your own meals?: Yes    Do your own shopping?: Yes    Previous Hospitalizations:   Any hospitalizations or ED visits within the last 12 months?: No      Advance Care Planning:   Living will: No    Durable POA for healthcare: No    Advanced directive: No    Advanced directive counseling given: Yes    Five wishes given: Yes      Comments: Patient does not currently living will power of   Discussed advanced directive planning  5 wishes given  Patient will consider and discuss with family  PREVENTIVE SCREENINGS      Cardiovascular Screening:    General: Screening Not Indicated, History Lipid Disorder and Screening Current      Diabetes Screening:     General: Screening Current      Colorectal Cancer Screening:     General: Screening Current      Prostate Cancer Screening:    General: Screening Current      Osteoporosis Screening:    General: Screening Not Indicated      Abdominal Aortic Aneurysm (AAA) Screening:    Risk factors include: age between 73-67 yo        General: Risks and Benefits Discussed    Due for: Screening AAA Ultrasound      Lung Cancer Screening:     General: Screening Not Indicated      Hepatitis C Screening:    General: Screening Current    Screening, Brief Intervention, and Referral to Treatment (SBIRT)    Screening  Typical number of drinks in a day: 1  Typical number of drinks in a week: 1  Interpretation: Low risk drinking behavior  Single Item Drug Screening:  How often have you used an illegal drug (including marijuana) or a prescription medication for non-medical reasons in the past year? never    Single Item Drug Screen Score: 0  Interpretation: Negative screen for possible drug use disorder    No exam data present     Physical Exam:     /74 (BP Location: Left arm, Patient Position: Sitting, Cuff Size: Large)   Pulse 70   Temp 98 7 °F (37 1 °C) (Temporal)   Ht 5' 10" (1 778 m)   Wt 115 kg (254 lb)   SpO2 97%   BMI 36 45 kg/m²     Physical Exam  Vitals and nursing note reviewed  Constitutional:       Appearance: He is well-developed  He is obese  Comments: Alert pleasant cooperative  Seated in room in no acute distress   HENT:      Head: Normocephalic and atraumatic        Right Ear: Tympanic membrane normal  Left Ear: Tympanic membrane normal       Mouth/Throat:      Mouth: Mucous membranes are moist       Pharynx: No posterior oropharyngeal erythema  Eyes:      Conjunctiva/sclera: Conjunctivae normal    Neck:      Vascular: No carotid bruit  Cardiovascular:      Rate and Rhythm: Normal rate and regular rhythm  Heart sounds: No murmur heard  Pulmonary:      Effort: Pulmonary effort is normal  No respiratory distress  Breath sounds: Normal breath sounds  Comments: Clear to auscultation throughout bilaterally  No crackles rhonchi no wheeze  Respiratory distress  Abdominal:      General: There is no distension  Palpations: Abdomen is soft  There is no mass  Tenderness: There is no abdominal tenderness  There is no right CVA tenderness, left CVA tenderness, guarding or rebound  Comments: Positive bowel sounds soft nontender nondistended   Musculoskeletal:      Cervical back: Neck supple  Skin:     General: Skin is warm and dry  Neurological:      General: No focal deficit present  Mental Status: He is alert     Psychiatric:         Mood and Affect: Mood normal           Spike Jefferson PA-C

## 2022-10-14 NOTE — ASSESSMENT & PLAN NOTE
Blood pressure stable at this time on lisinopril 30 mg daily and doxazosin 8 mg daily  And courage daily activity  Encourage fluid intake  Discussed importance of gradual change in positioning due to the orthostatic risk with doxazosin

## 2022-10-14 NOTE — ASSESSMENT & PLAN NOTE
Healthy adult male  Continue with GI regarding ongoing colon cancer screening  Patient has had colonoscopy in the past   No medication changes at this time  Recommended regular eye doctor appointment  Continue follow with dentist   Vaccines discussed  Labs prior to 6 month follow-up

## 2022-10-14 NOTE — ASSESSMENT & PLAN NOTE
Give encouragement with low carbohydrate diet and regular daily activity  Encouraged weight loss  Will check hemoglobin A1c with next lab set  Glucose relatively stable

## 2022-10-14 NOTE — PATIENT INSTRUCTIONS
Impaired fasting glucose  Give encouragement with low carbohydrate diet and regular daily activity  Encouraged weight loss  Will check hemoglobin A1c with next lab set  Glucose relatively stable  Benign essential hypertension  Blood pressure stable at this time on lisinopril 30 mg daily and doxazosin 8 mg daily  And courage daily activity  Encourage fluid intake  Discussed importance of gradual change in positioning due to the orthostatic risk with doxazosin  Elevated coronary artery calcium score  Commended patient on improved cholesterol  Continue Lipitor 20 mg daily along with fish oil  Stage 3a chronic kidney disease [facility])  Lab Results   Component Value Date    EGFR 58 10/10/2022    EGFR 57 05/20/2022    EGFR 47 04/12/2022    CREATININE 1 27 10/10/2022    CREATININE 1 29 05/20/2022    CREATININE 1 52 (H) 04/12/2022     Renal function stable at this time  Will repeat uric acid level with next lab set  Continue allopurinol  No recent gouty flares  Vaccine counseling  Recommend shingles vaccine, pneumonia vaccine and COVID booster (bivalent)  Patient will consider

## 2022-10-14 NOTE — ASSESSMENT & PLAN NOTE
Lab Results   Component Value Date    EGFR 58 10/10/2022    EGFR 57 05/20/2022    EGFR 47 04/12/2022    CREATININE 1 27 10/10/2022    CREATININE 1 29 05/20/2022    CREATININE 1 52 (H) 04/12/2022     Renal function stable at this time  Will repeat uric acid level with next lab set  Continue allopurinol  No recent gouty flares

## 2022-11-11 DIAGNOSIS — E78.5 HYPERLIPIDEMIA, UNSPECIFIED HYPERLIPIDEMIA TYPE: ICD-10-CM

## 2022-11-11 RX ORDER — ATORVASTATIN CALCIUM 20 MG/1
TABLET, FILM COATED ORAL
Qty: 90 TABLET | Refills: 1 | Status: SHIPPED | OUTPATIENT
Start: 2022-11-11

## 2022-12-13 PROBLEM — Z00.00 MEDICARE ANNUAL WELLNESS VISIT, SUBSEQUENT: Status: RESOLVED | Noted: 2022-10-14 | Resolved: 2022-12-13

## 2022-12-24 ENCOUNTER — OFFICE VISIT (OUTPATIENT)
Dept: URGENT CARE | Facility: MEDICAL CENTER | Age: 67
End: 2022-12-24

## 2022-12-24 VITALS
TEMPERATURE: 97.2 F | RESPIRATION RATE: 18 BRPM | SYSTOLIC BLOOD PRESSURE: 147 MMHG | DIASTOLIC BLOOD PRESSURE: 70 MMHG | OXYGEN SATURATION: 95 % | WEIGHT: 258 LBS | BODY MASS INDEX: 36.12 KG/M2 | HEIGHT: 71 IN | HEART RATE: 90 BPM

## 2022-12-24 DIAGNOSIS — R05.1 ACUTE COUGH: Primary | ICD-10-CM

## 2022-12-24 RX ORDER — AZITHROMYCIN 250 MG/1
TABLET, FILM COATED ORAL
Qty: 6 TABLET | Refills: 0 | Status: SHIPPED | OUTPATIENT
Start: 2022-12-24 | End: 2022-12-28

## 2022-12-24 NOTE — PROGRESS NOTES
[id number]        NAME: Geovany Gómez is a 79 y o  male  : 1955    MRN: [id number]  DATE: 2022  TIME: 10:00 AM    Assessment and Plan   Acute cough [R05 1]  1  Acute cough  azithromycin (ZITHROMAX) 250 mg tablet            Patient Instructions     Acute cough   Zithromax as directed  Steam from the shower, humidifier in bedroom  Follow up with PCP in 3-5 days  Proceed to  ER if symptoms worsen  Chief Complaint     Chief Complaint   Patient presents with   • Sinus Problem     Pt  C/O sinus congestion for the past 3 days  Denies fevers  History of Present Illness       44-year-old male who presents complaining of cough productive of yellow sputum x4 days  Patient states he has been taking over-the-counter medications with no relief  Denies chest pain, shortness off breath  States he does not want COVID test at this time  Review of Systems   Review of Systems   Constitutional: Negative  HENT: Positive for congestion and sore throat  Negative for dental problem, drooling, ear pain, postnasal drip, rhinorrhea, sinus pain, trouble swallowing and voice change  Eyes: Negative  Respiratory: Positive for cough  Negative for apnea, choking, chest tightness, shortness of breath, wheezing and stridor  Cardiovascular: Negative  Negative for chest pain           Current Medications       Current Outpatient Medications:   •  allopurinol (ZYLOPRIM) 100 mg tablet, Take 2 tablets (200 mg total) by mouth daily, Disp: 180 tablet, Rfl: 1  •  aspirin 81 MG tablet, Take 1 tablet by mouth daily, Disp: , Rfl:   •  atorvastatin (LIPITOR) 20 mg tablet, TAKE 1 TABLET BY MOUTH EVERY DAY, Disp: 90 tablet, Rfl: 1  •  azithromycin (ZITHROMAX) 250 mg tablet, Take 2 tablets today then 1 tablet daily x 4 days, Disp: 6 tablet, Rfl: 0  •  doxazosin (CARDURA) 8 MG tablet, Take 1 tablet (8 mg total) by mouth daily, Disp: 90 tablet, Rfl: 1  •  lisinopril (ZESTRIL) 30 mg tablet, Take 1 tablet (30 mg total) by mouth daily, Disp: 90 tablet, Rfl: 1  •  Omega-3 1000 MG CAPS, Take 1 capsule by mouth daily  , Disp: , Rfl:   •  hydrocortisone (ANUSOL-HC) 2 5 % rectal cream, Apply topically 2 (two) times a day as needed for hemorrhoids, Disp: 28 g, Rfl: 0    Current Allergies     Allergies as of 12/24/2022 - Reviewed 12/24/2022   Allergen Reaction Noted   • Penicillins Hives 09/16/2015   • Pollen extract  11/23/2015            The following portions of the patient's history were reviewed and updated as appropriate: allergies, current medications, past family history, past medical history, past social history, past surgical history and problem list      Past Medical History:   Diagnosis Date   • Bereavement 7/31/2020   • Blurred vision     Last assessed - 1/28/15   • CAP (community acquired pneumonia)     Last assessed - 9/20/16   • Elevated bilirubin 11/6/2020   • Hemorrhoids     Last assessed - 11/23/15   • HTN (hypertension)    • Hypercholesterolemia    • IFG (impaired fasting glucose)     Last assessed - 11/18/14   • Rotator cuff tendinitis     Last assessed - 6/17/15       Past Surgical History:   Procedure Laterality Date   • APPENDECTOMY      Resolved - 1/24/09   • COLONOSCOPY      2021   • WISDOM TOOTH EXTRACTION         Family History   Problem Relation Age of Onset   • Hyperlipidemia Mother    • Hypertension Mother    • Pulmonary embolism Mother    • Diabetes type II Mother    • Hyperlipidemia Father    • Hypertension Father    • Prostate cancer Father    • Diabetes type II Father          Medications have been verified  Objective   /70   Pulse 90   Temp (!) 97 2 °F (36 2 °C)   Resp 18   Ht 5' 11" (1 803 m)   Wt 117 kg (258 lb)   SpO2 95%   BMI 35 98 kg/m²        Physical Exam     Physical Exam  Constitutional:       General: He is not in acute distress  Appearance: Normal appearance  He is well-developed  He is not diaphoretic  HENT:      Head: Normocephalic and atraumatic  Right Ear: Hearing, tympanic membrane, ear canal and external ear normal       Left Ear: Hearing, tympanic membrane, ear canal and external ear normal       Nose: Rhinorrhea present  Right Sinus: No maxillary sinus tenderness or frontal sinus tenderness  Left Sinus: No maxillary sinus tenderness or frontal sinus tenderness  Mouth/Throat:      Pharynx: Uvula midline  Cardiovascular:      Rate and Rhythm: Normal rate and regular rhythm  Heart sounds: Normal heart sounds  Pulmonary:      Effort: Pulmonary effort is normal  No respiratory distress  Breath sounds: Normal breath sounds  No stridor  No wheezing or rales  Chest:      Chest wall: No tenderness  Musculoskeletal:      Cervical back: Normal range of motion and neck supple  Lymphadenopathy:      Cervical: No cervical adenopathy  Neurological:      Mental Status: He is alert

## 2022-12-24 NOTE — PATIENT INSTRUCTIONS
Acute cough   Zithromax as directed  Steam from the shower, humidifier in bedroom  Follow up with PCP in 3-5 days  Proceed to  ER if symptoms worsen

## 2023-02-14 DIAGNOSIS — E79.0 ELEVATED URIC ACID IN BLOOD: ICD-10-CM

## 2023-02-14 RX ORDER — ALLOPURINOL 100 MG/1
200 TABLET ORAL DAILY
Qty: 180 TABLET | Refills: 1 | Status: SHIPPED | OUTPATIENT
Start: 2023-02-14

## 2023-03-14 DIAGNOSIS — I10 BENIGN ESSENTIAL HYPERTENSION: ICD-10-CM

## 2023-03-14 RX ORDER — LISINOPRIL 30 MG/1
30 TABLET ORAL DAILY
Qty: 90 TABLET | Refills: 1 | Status: SHIPPED | OUTPATIENT
Start: 2023-03-14

## 2023-03-21 DIAGNOSIS — I10 BENIGN ESSENTIAL HYPERTENSION: ICD-10-CM

## 2023-03-21 RX ORDER — DOXAZOSIN 8 MG/1
8 TABLET ORAL DAILY
Qty: 90 TABLET | Refills: 1 | Status: SHIPPED | OUTPATIENT
Start: 2023-03-21

## 2023-06-14 DIAGNOSIS — E78.5 HYPERLIPIDEMIA, UNSPECIFIED HYPERLIPIDEMIA TYPE: ICD-10-CM

## 2023-06-14 RX ORDER — ATORVASTATIN CALCIUM 20 MG/1
20 TABLET, FILM COATED ORAL DAILY
Qty: 90 TABLET | Refills: 1 | Status: SHIPPED | OUTPATIENT
Start: 2023-06-14

## 2023-06-29 NOTE — PROGRESS NOTES
Assessment    1  Benign essential hypertension (401 1) (I10)   2  Hypercholesterolemia (272 0) (E78 00)   3  Acute gouty arthritis (274 01) (M10 9)   4  Hyperuricemia (790 6) (E79 0)    Plan  Acute gouty arthritis, Benign essential hypertension    · Colchicine 0 6 MG Oral Tablet; Take 1 tablet twice daily  Benign essential hypertension, Hyperuricemia    · (1) BASIC METABOLIC PROFILE; Status:Active; Requested for:29Nov2017;    · (1) URIC ACID; Status:Active; Requested for:29Nov2017;   Depression screening    · PHQ-2 Adult Depression Screening ; every 1 year; Last [id number]; Next 94GSC0178; Status:Active  Hyperuricemia    · Allopurinol 300 MG Oral Tablet; TAKE 1 TABLET DAILY  Need for influenza vaccination    · Fluzone Quadrivalent 0 5 ML Intramuscular Suspension Prefilled Syringe    Chief Complaint  Chief Complaint Free Text Note Form: Patient is here for a 6 m f/u visit for hypertension, gouty arthritis and hyperlipidemia  Review labs  He continues to have gout pain his left 1st toe  History of Present Illness  Abnormal Blood Test: The history is obtained from the patient  Hyperuricemia patient was seen twice for acute gout and was treated he had a high uric acid level which we have not addressed he was on colchicine he is gout is better but it aggravates off and on I discussed with him to start him on the allopurinol for did bringing his uric acid level less than 6 right now his uric acid level was 7 7 it was up to 8 1 and 8 9 was/were drawn  Hyperlipidemia (Follow-Up): The patient states his hyperlipidemia has been under good control since the last visit  Comorbid Illnesses: hypertension  Interval Events: Labs reviewed with the patient: Bradley cAe is better and the good as compared to before triglycerides are slightly elevated for which he need to do some dieting weight loss  He has no significant interval events     Symptoms: The patient is currently asymptomatic  denies chest pain,-- denies intermittent leg claudication,-- denies muscle pain,-- denies muscle weakness-- and-- denies   Associated symptoms include no memory loss  Medications: Medication(s): omega 3 , red rice yeast    Hypertension (Follow-Up): The patient presents for follow-up of very well controlled  Symptoms: denies impaired vision,-- denies dyspnea,-- denies chest pain-- and-- denies intermittent leg claudication  Review of Systems  Complete-Male:  Constitutional: No fever or chills, feels well, no tiredness, no recent weight gain or weight loss  Eyes: No complaints of eye pain, no red eyes, no discharge from eyes, no itchy eyes  ENT: no complaints of earache, no hearing loss, no nosebleeds, no nasal discharge, no sore throat, no hoarseness  Cardiovascular: No complaints of slow heart rate, no fast heart rate, no chest pain, no palpitations, no leg claudication, no lower extremity  Respiratory: No complaints of shortness of breath, no wheezing, no cough, no SOB on exertion, no orthopnea or PND  Gastrointestinal: No complaints of abdominal pain, no constipation, no nausea or vomiting, no diarrhea or bloody stools  Genitourinary: No complaints of dysuria, no incontinence, no hesitancy, no nocturia, no genital lesion, no testicular pain  Integumentary: No complaints of skin rash or skin lesions, no itching, no skin wound, no dry skin  Neurological: No compliants of headache, no confusion, no convulsions, no numbness or tingling, no dizziness or fainting, no limb weakness, no difficulty walking  Psychiatric: Is not suicidal, no sleep disturbances, no anxiety or depression, no change in personality, no emotional problems  Active Problems  1  Acute gouty arthritis (274 01) (M10 9)   2  Benign essential hypertension (401 1) (I10)   3  Hypercholesterolemia (272 0) (E78 00)   4  Hyperuricemia (790 6) (E79 0)    Past Medical History  1  History of Acute frontal sinusitis (461 1) (J01 10)   2  History of Acute gout (274 01) (M10 9)   3  History of Acute maxillary sinusitis (461 0) (J01 00)   4  History of Acute maxillary sinusitis, recurrence not specified (461 0) (J01 00)   5  History of Colonoscopy (Fiberoptic) Screening   6  Community acquired pneumonia (5) (J18 9)   7  History of Encounter for removal of sutures (V58 32) (Z48 02)   8  History of Encounter for screening for malignant neoplasm of colon (V76 51) (Z12 11)   9  History of Exposure to influenza (V01 79) (Z20 828)   10  History of acute bronchitis (V12 69) (Z87 09)   11  History of blurred vision (V12 49) (Z86 69)   12  History of hemorrhoids (V13 89) (Z87 19)   13  History of hypertension (V12 59) (Z86 79)   14  History of Impaired fasting glucose (790 21) (R73 01)   15  History of Influenza A (487 1) (J10 1)   16  Denied: History of Mental Status Change   17  History of Need for immunization against influenza (V04 81) (Z23)   18  History of Rotator cuff tendinitis (726 10) (M75 80)   19  History of Screening PSA (prostate specific antigen) (V76 44) (Z12 5)   20  History of Shoulder pain, right (719 41) (M25 511)    Surgical History  1  History of Appendectomy    Family History  Mother    1  Family history of Hyperlipidemia   2  Family history of Hypertension (V17 49)   3  Family history of Pulmonary Embolism   4  Family history of Type 2 Diabetes Mellitus  Father    5  Family history of Hyperlipidemia   6  Family history of Hypertension (V17 49)   7  Family history of Prostate Cancer (V16 42)   8  Family history of Type 2 Diabetes Mellitus  Family History    9  Family history of Family Health Status 1  Children Living    Social History     · Being A Social Drinker   · Denied: History of Drug Use   · Marital History - Currently    · Never A Smoker   · Occupation:   · Travel history  Social History Reviewed: The social history was reviewed and updated today  The social history was reviewed and is unchanged  Current Meds   1   Aspirin 81 MG TABS; TAKE 1 TABLET DAILY; Therapy: (Recorded:18Nov2014) to Recorded   2  Colchicine 0 6 MG Oral Tablet; Take 1 tablet twice daily; Therapy: 71JDK4965 to (Last Rx:11Oct2017)  Requested for: 11Oct2017 Ordered   3  Doxazosin Mesylate 8 MG Oral Tablet; TAKE 1 TABLET DAILY  Requested for: 67NUB9445; Last Rx:17Oct2017 Ordered   4  Olmesartan Medoxomil 20 MG Oral Tablet; Take 1 tablet daily; Therapy: 14LOQ6621 to (Evaluate:49Aid0529)  Requested for: 22MEO2842; Last Rx:22May2017 Ordered   5  Omega 3 CAPS; Therapy: (Recorded:26Mar2014) to Recorded   6  Proctosol HC 2 5 % Rectal Cream; Apply 4 times a day as needed; Therapy: 69OZK3493 to (Last Rx:17Jun2015)  Requested for: 55RCJ7589 Ordered   7  Red Yeast Rice CAPS; Therapy: (Recorded:26Mar2014) to Recorded  Medication List Reviewed: The medication list was reviewed and updated today  Allergies  1  Penicillins  2  No Known Food Allergies   3  Seasonal    Vitals  Vital Signs    Recorded: 30RSD4230 04:24PM   Temperature 98 6 F, Tympanic   Heart Rate 84   Systolic 212, LUE, Sitting   Diastolic 66, LUE, Sitting   BP CUFF SIZE Large   Height 5 ft 10 87 in   Weight 257 lb 2 oz   BMI Calculated 35 99   BSA Calculated 2 34   O2 Saturation 97       Physical Exam   Constitutional  General appearance: No acute distress, well appearing and well nourished  Eyes  Conjunctiva and lids: No swelling, erythema, or discharge  Pupils and irises: Equal, round and reactive to light  Ears, Nose, Mouth, and Throat  External inspection of ears and nose: Normal    Oropharynx: Normal with no erythema, edema, exudate or lesions  Pulmonary  Respiratory effort: No increased work of breathing or signs of respiratory distress  Auscultation of lungs: Clear to auscultation, equal breath sounds bilaterally, no wheezes, no rales, no rhonci  Cardiovascular  Auscultation of heart: Normal rate and rhythm, normal S1 and S2, without murmurs  Abdomen  Abdomen: Non-tender, no masses     Liver and spleen: No hepatomegaly or splenomegaly  Musculoskeletal  Gait and station: Normal    Inspection/palpation of joints, bones, and muscles: Abnormal    Skin  Skin and subcutaneous tissue: Normal without rashes or lesions  Neurologic  Cranial nerves: Cranial nerves 2-12 intact  Reflexes: 2+ and symmetric  Sensation: No sensory loss  Psychiatric  Orientation to person, place and time: Normal    Mood and affect: Normal          Results/Data  PHQ-2 Adult Depression Screening 63LBF1086 04:52PM User, Kikas     Test Name Result Flag Reference   PHQ-2 Adult Depression Score 0       Over the last two weeks, how often have you been bothered by any of the following problems? Little interest or pleasure in doing things: Not at all - 0 Feeling down, depressed, or hopeless: Not at all - 0   PHQ-2 Adult Depression Screening Negative       (1) URIC ACID 40XBW8102 09:39AM [employer]    Order Number: [id number]     Test Name Result Flag Reference   URIC ACID 7 7 mg/dL  4 2-8 0   Specimen collection should occur prior to Metamizole administration due to the potential for falsely depressed results  (1) COMPREHENSIVE METABOLIC PANEL [id number] 54:13WD E-[facility] BareedEE     Test Name Result Flag Reference   SODIUM 141 mmol/L  136-145   POTASSIUM 4 6 mmol/L  3 5-5 3   CHLORIDE 109 mmol/L H 100-108   CARBON DIOXIDE 26 mmol/L  21-32   ANION GAP (CALC) 6 mmol/L  4-13   BLOOD UREA NITROGEN 16 mg/dL  5-25   CREATININE 1 28 mg/dL  0 60-1 30   Standardized to IDMS reference method   CALCIUM 8 9 mg/dL  8 3-10 1   BILI, TOTAL 0 75 mg/dL  0 20-1 00   ALK PHOSPHATAS 95 U/L     ALT (SGPT) 38 U/L  12-78   Specimen collection should occur prior to Sulfasalazine and/or Sulfapyridine administration due to the potential for falsely depressed results  AST(SGOT) 20 U/L  5-45   Specimen collection should occur prior to Sulfasalazine administration due to the potential for falsely depressed results     ALBUMIN 3 6 g/dL  3 5-5 0   TOTAL PROTEIN 7 3 g/dL 6  4-8 2   eGFR 60 ml/min/1 73sq m       National Kidney Disease Education Program recommendations are as follows: GFR calculation is accurate only with a steady state creatinine Chronic Kidney disease less than 60 ml/min/1 73 sq  meters Kidney failure less than 15 ml/min/1 73 sq  meters  GLUCOSE FASTING 93 mg/dL  65-99   Specimen collection should occur prior to Sulfasalazine administration due to the potential for falsely depressed results  Specimen collection should occur prior to Sulfapyridine administration due to the potential for falsely elevated results  (1) LIPID PANEL, FASTING [id number] 09:39AM Seferino Gee     Test Name Result Flag Reference   CHOLESTEROL 173 mg/dL     HDL,DIRECT 34 mg/dL L 40-60   Specimen collection should occur prior to Metamizole administration due to the potential for falsley depressed results  LDL CHOLESTEROL CALCULATED 108 mg/dL H 0-100     Triglyceride:       Normal <150 mg/dl  Borderline High 150-199 mg/dl  High 200-499 mg/dl  Very High >499 mg/dl   Cholesterol:      Desirable <200 mg/dl   Borderline High 200-239 mg/dl   High >239 mg/dl   HDL Cholesterol:      High>59 mg/dL   Low <41 mg/dL   This screening LDL is a calculated result  It does not have the accuracy of the Direct Measured LDL in the monitoring of patients with hyperlipidemia and/or statin therapy  Direct Measure LDL (SLG356) must be ordered separately in these patients  TRIGLYCERIDES 156 mg/dL H <=150   Specimen collection should occur prior to N-Acetylcysteine or Metamizole administration due to the potential for falsely depressed results       (1) CBC/PLT/DIFF [id number] 09:39AM Seferino Gee     Test Name Result Flag Reference   WBC COUNT 6 40 Thousand/uL  4 31-10 16   RBC COUNT 4 75 Million/uL  3 88-5 62   HEMOGLOBIN 14 5 g/dL  12 0-17 0   HEMATOCRIT 42 4 %  36 5-49 3   MCV 89 fL  82-98   MCH 30 5 pg  26 8-34 3   MCHC 34 2 g/dL  31 4-37 4   RDW 12 3 %  11 6-15 1   MPV 10 8 fL  8 9-12 7 PLATELET COUNT 448 Thousands/uL  149-390   nRBC AUTOMATED 0 /100 WBCs     NEUTROPHILS RELATIVE PERCENT 40 % L 43-75   LYMPHOCYTES RELATIVE PERCENT 41 %  14-44   MONOCYTES RELATIVE PERCENT 7 %  4-12   EOSINOPHILS RELATIVE PERCENT 11 % H 0-6   BASOPHILS RELATIVE PERCENT 1 %  0-1   NEUTROPHILS ABSOLUTE COUNT 2 56 Thousands/? ??L  1 85-7 62   LYMPHOCYTES ABSOLUTE COUNT 2 64 Thousands/? ??L  0 60-4 47   MONOCYTES ABSOLUTE COUNT 0 44 Thousand/? ??L  0 17-1 22   EOSINOPHILS ABSOLUTE COUNT 0 67 Thousand/? ??L H 0 00-0 61   BASOPHILS ABSOLUTE COUNT 0 07 Thousands/? ??L  0 00-0 10     (1) URINALYSIS (will reflex a microscopy if leukocytes, occult blood, protein or nitrites are not within normal limits) [id number] 09:39AM Toby Boas     Test Name Result Flag Reference   COLOR Yellow     CLARITY Clear     SPECIFIC GRAVITY UA 1 022  1 003-1 030   PH UA 6 5  4 5-8 0   LEUKOCYTE ESTERASE UA Negative  Negative   NITRITE UA Negative  Negative   PROTEIN UA Negative mg/dl  Negative   GLUCOSE UA Negative mg/dl  Negative   KETONES UA Negative mg/dl  Negative   UROBILINOGEN UA 1 0 E U /dl  0 2, 1 0 E U /dl   BILIRUBIN UA Negative  Negative   BLOOD UA Negative  Negative     (1) TSH [id number] 09:39AM Toby Boas     Test Name Result Flag Reference   TSH 3 090 uIU/mL  0 358-3 740   Patients undergoing fluorescein dye angiography may retain small amounts of fluorescein in the body for 48-72 hours post procedure  Samples containing fluorescein can produce falsely depressed TSH values  If the patient had this procedure,a specimen should be resubmitted post fluorescein clearance       (1) PSA (SCREEN) (Dx V76 44 Screen for Prostate Cancer) [id number] 09:39AM Toby Boas     Test Name Result Flag Reference   PROSTATE SPECIFIC ANTIGEN 0 3 ng/mL  0 0-4 0   American Urological Association Guidelines define biochemical recurrence of prostate cancer as a detectable or rising PSA value post-radical prostatectomy that is greater than or equal to 0 2 ng/mL with a second confirmatory level of greater than or equal to 0 2 ng/mL         Signatures   Electronically signed by : Shakeel Bashir MD; Nov 29 2017  5:10PM EST                       (Author) 4 (moderate pain)

## 2023-07-07 ENCOUNTER — OFFICE VISIT (OUTPATIENT)
Dept: INTERNAL MEDICINE CLINIC | Facility: OTHER | Age: 68
End: 2023-07-07
Payer: COMMERCIAL

## 2023-07-07 ENCOUNTER — RA CDI HCC (OUTPATIENT)
Dept: OTHER | Facility: HOSPITAL | Age: 68
End: 2023-07-07

## 2023-07-07 VITALS
HEART RATE: 60 BPM | SYSTOLIC BLOOD PRESSURE: 120 MMHG | WEIGHT: 252 LBS | OXYGEN SATURATION: 99 % | HEIGHT: 71 IN | DIASTOLIC BLOOD PRESSURE: 70 MMHG | BODY MASS INDEX: 35.28 KG/M2 | TEMPERATURE: 97.9 F

## 2023-07-07 DIAGNOSIS — E78.00 HYPERCHOLESTEROLEMIA: ICD-10-CM

## 2023-07-07 DIAGNOSIS — K64.8 OTHER HEMORRHOIDS: ICD-10-CM

## 2023-07-07 DIAGNOSIS — Z12.5 SCREENING FOR PROSTATE CANCER: ICD-10-CM

## 2023-07-07 DIAGNOSIS — I10 BENIGN ESSENTIAL HYPERTENSION: Primary | ICD-10-CM

## 2023-07-07 DIAGNOSIS — R73.01 IMPAIRED FASTING GLUCOSE: ICD-10-CM

## 2023-07-07 DIAGNOSIS — Z87.39 HISTORY OF GOUT: ICD-10-CM

## 2023-07-07 DIAGNOSIS — N18.31 STAGE 3A CHRONIC KIDNEY DISEASE (HCC): ICD-10-CM

## 2023-07-07 PROCEDURE — 99214 OFFICE O/P EST MOD 30 MIN: CPT | Performed by: NURSE PRACTITIONER

## 2023-07-07 RX ORDER — HYDROCORTISONE 25 MG/G
CREAM TOPICAL 2 TIMES DAILY PRN
Qty: 28 G | Refills: 1 | Status: SHIPPED | OUTPATIENT
Start: 2023-07-07

## 2023-07-07 NOTE — ASSESSMENT & PLAN NOTE
Lab Results   Component Value Date    EGFR 58 10/10/2022    EGFR 57 05/20/2022    EGFR 47 04/12/2022    CREATININE 1.27 10/10/2022    CREATININE 1.29 05/20/2022    CREATININE 1.52 (H) 04/12/2022     -Update renal function  -Encouraged adequate hydration  -Avoid nephrotoxins when possible

## 2023-07-07 NOTE — ASSESSMENT & PLAN NOTE
- Currently on atorvastatin 20 mg daily and aspirin 81 mg , fish oil 1000 mg daily  -Update lipid panel

## 2023-07-07 NOTE — PROGRESS NOTES
[address] [facility] coding opportunities       Chart reviewed, no opportunity found: CHART REVIEWED, [address]     Patients Insurance     Medicare Insurance: [employer]

## 2023-07-07 NOTE — ASSESSMENT & PLAN NOTE
- Blood pressure well controlled on current regimen of doxazosin 8 mg daily and lisinopril 30 mg daily

## 2023-07-07 NOTE — PROGRESS NOTES
He is compliant with his medications, currently on doxazosin 8 mg daily and lisinopril 30 mg assessment/Plan:    Problem List Items Addressed This Visit        Digestive    Other hemorrhoids     - Continue Anusol as needed         Relevant Medications    hydrocortisone (ANUSOL-HC) 2.5 % rectal cream       Endocrine    Impaired fasting glucose     - Update fasting glucose and hemoglobin A1c         Relevant Orders    Hemoglobin A1C       Cardiovascular and Mediastinum    Benign essential hypertension - Primary     - Blood pressure well controlled on current regimen of doxazosin 8 mg daily and lisinopril 30 mg daily            Genitourinary    Stage 3a chronic kidney disease ([address])     Lab Results   Component Value Date    EGFR 58 10/10/2022    EGFR 57 05/20/2022    EGFR 47 04/12/2022    CREATININE 1.27 10/10/2022    CREATININE 1.29 05/20/2022    CREATININE 1.52 (H) 04/12/2022     -Update renal function  -Encouraged adequate hydration  -Avoid nephrotoxins when possible         Relevant Orders    CBC and differential    Comprehensive metabolic panel       Other    Hypercholesterolemia     - Currently on atorvastatin 20 mg daily and aspirin 81 mg , fish oil 1000 mg daily  -Update lipid panel         Relevant Orders    Lipid Panel with Direct LDL reflex    History of gout     - Continue allopurinol 200 mg daily  -No recent gout flares  -Update uric acid level         Relevant Orders    Uric acid   Other Visit Diagnoses     Screening for prostate cancer        Relevant Orders    PSA, Total Screen        Patient will go for labs and we will contact him with results    BMI Counseling: Body mass index is 35.15 kg/m². Discussed the patient's BMI with him. The BMI is above normal. Nutrition recommendations include 3-5 servings of fruits/vegetables daily, moderation in carbohydrate intake, increasing intake of lean protein and reducing intake of saturated fat and trans fat.  Exercise recommendations include moderate aerobic physical activity for 150 minutes/week. [employer] software was used to dictate this note. It may contain errors with dictating incorrect words or incorrect spelling. Please contact the provider directly with any questions. Subjective:      Patient ID: Sonya Laguerre is a 79 y.o. male. HPI     Patient presents today for 6 month follow up  He did not have his labs completed before his visit today   He no longer plans to see cardiology unless necessary, he would prefer to have our office manage his hypertension and hyperlipidemia. HTN- he does not monitor his BP at home. He is compliant with his medications. Currently on doxazosin 8 mg daily and lisinopril 30 mg daily    Gout - no gout flares. Currently on allopurinol 200 mg daily    HLD-no recent lipid panel, compliant with atorvastatin 20 mg and aspirin 81 mg. Takes fish oil 1000 mg daily    The following portions of the patient's history were reviewed and updated as appropriate: allergies, current medications, past family history, past medical history, past social history, past surgical history and problem list.    Review of Systems   Constitutional: Negative for activity change, appetite change and unexpected weight change. Eyes: Negative for visual disturbance. Respiratory: Negative for cough, chest tightness and shortness of breath. Cardiovascular: Negative for chest pain, palpitations and leg swelling. Neurological: Negative for headaches.          Past Medical History:   Diagnosis Date   • Bereavement 7/31/2020   • Blurred vision     Last assessed - 1/28/15   • CAP (community acquired pneumonia)     Last assessed - 9/20/16   • Elevated bilirubin 11/6/2020   • Hemorrhoids     Last assessed - 11/23/15   • HTN (hypertension)    • Hypercholesterolemia    • IFG (impaired fasting glucose)     Last assessed - 11/18/14   • Rotator cuff tendinitis     Last assessed - 6/17/15         Current Outpatient Medications:   •  allopurinol (ZYLOPRIM) 100 mg tablet, Take 2 tablets (200 mg total) by mouth daily, Disp: 180 tablet, Rfl: 1  •  aspirin 81 MG tablet, Take 1 tablet by mouth daily, Disp: , Rfl:   •  atorvastatin (LIPITOR) 20 mg tablet, Take 1 tablet (20 mg total) by mouth daily, Disp: 90 tablet, Rfl: 1  •  doxazosin (CARDURA) 8 MG tablet, Take 1 tablet (8 mg total) by mouth daily, Disp: 90 tablet, Rfl: 1  •  hydrocortisone (ANUSOL-HC) 2.5 % rectal cream, Apply topically 2 (two) times a day as needed for hemorrhoids, Disp: 28 g, Rfl: 1  •  lisinopril (ZESTRIL) 30 mg tablet, Take 1 tablet (30 mg total) by mouth daily, Disp: 90 tablet, Rfl: 1  •  Omega-3 1000 MG CAPS, Take 1 capsule by mouth daily  , Disp: , Rfl:     Allergies   Allergen Reactions   • Penicillins Hives   • Pollen Extract        Social History   Past Surgical History:   Procedure Laterality Date   • APPENDECTOMY      Resolved - 1/24/09   • COLONOSCOPY      2021   • WISDOM TOOTH EXTRACTION       Family History   Problem Relation Age of Onset   • Hyperlipidemia Mother    • Hypertension Mother    • Pulmonary embolism Mother    • Diabetes type II Mother    • Hyperlipidemia Father    • Hypertension Father    • Prostate cancer Father    • Diabetes type II Father        Objective:  /70 (BP Location: Left arm, Patient Position: Sitting, Cuff Size: Large)   Pulse 60   Temp 97.9 °F (36.6 °C) (Temporal)   Ht 5' 11" (1.803 m)   Wt 114 kg (252 lb)   SpO2 99%   BMI 35.15 kg/m²      Physical Exam  Vitals reviewed. Constitutional:       General: He is not in acute distress. Appearance: Normal appearance. He is obese. He is not diaphoretic. HENT:      Head: Normocephalic and atraumatic. Right Ear: Tympanic membrane and external ear normal.      Left Ear: Tympanic membrane and external ear normal.   Eyes:      Extraocular Movements: Extraocular movements intact. Conjunctiva/sclera: Conjunctivae normal.      Pupils: Pupils are equal, round, and reactive to light.    Neck: Vascular: No carotid bruit. Cardiovascular:      Rate and Rhythm: Normal rate and regular rhythm. Heart sounds: Normal heart sounds. Pulmonary:      Effort: Pulmonary effort is normal. No respiratory distress. Breath sounds: Normal breath sounds. No wheezing, rhonchi or rales. Neurological:      Mental Status: He is alert and oriented to person, place, and time. Mental status is at baseline. Psychiatric:         Mood and Affect: Mood normal.         Behavior: Behavior normal.         Thought Content:  Thought content normal.         Judgment: Judgment normal.

## 2023-07-24 ENCOUNTER — APPOINTMENT (OUTPATIENT)
Dept: LAB | Facility: MEDICAL CENTER | Age: 68
End: 2023-07-24
Payer: COMMERCIAL

## 2023-07-24 DIAGNOSIS — N18.31 STAGE 3A CHRONIC KIDNEY DISEASE (HCC): ICD-10-CM

## 2023-07-24 DIAGNOSIS — Z87.39 HISTORY OF GOUT: ICD-10-CM

## 2023-07-24 DIAGNOSIS — E78.00 HYPERCHOLESTEROLEMIA: ICD-10-CM

## 2023-07-24 DIAGNOSIS — Z12.5 SCREENING FOR PROSTATE CANCER: ICD-10-CM

## 2023-07-24 DIAGNOSIS — R73.01 IMPAIRED FASTING GLUCOSE: ICD-10-CM

## 2023-07-24 LAB
ALBUMIN SERPL BCP-MCNC: 3.6 G/DL (ref 3.5–5)
ALP SERPL-CCNC: 123 U/L (ref 46–116)
ALT SERPL W P-5'-P-CCNC: 30 U/L (ref 12–78)
ANION GAP SERPL CALCULATED.3IONS-SCNC: 4 MMOL/L
AST SERPL W P-5'-P-CCNC: 19 U/L (ref 5–45)
BASOPHILS # BLD AUTO: 0.07 THOUSANDS/ÂΜL (ref 0–0.1)
BASOPHILS NFR BLD AUTO: 1 % (ref 0–1)
BILIRUB SERPL-MCNC: 0.84 MG/DL (ref 0.2–1)
BUN SERPL-MCNC: 16 MG/DL (ref 5–25)
CALCIUM SERPL-MCNC: 9.3 MG/DL (ref 8.3–10.1)
CHLORIDE SERPL-SCNC: 110 MMOL/L (ref 96–108)
CHOLEST SERPL-MCNC: 119 MG/DL
CO2 SERPL-SCNC: 27 MMOL/L (ref 21–32)
CREAT SERPL-MCNC: 1.28 MG/DL (ref 0.6–1.3)
EOSINOPHIL # BLD AUTO: 0.5 THOUSAND/ÂΜL (ref 0–0.61)
EOSINOPHIL NFR BLD AUTO: 8 % (ref 0–6)
ERYTHROCYTE [DISTWIDTH] IN BLOOD BY AUTOMATED COUNT: 12.9 % (ref 11.6–15.1)
EST. AVERAGE GLUCOSE BLD GHB EST-MCNC: 117 MG/DL
GFR SERPL CREATININE-BSD FRML MDRD: 57 ML/MIN/1.73SQ M
GLUCOSE P FAST SERPL-MCNC: 103 MG/DL (ref 65–99)
HBA1C MFR BLD: 5.7 %
HCT VFR BLD AUTO: 44.4 % (ref 36.5–49.3)
HDLC SERPL-MCNC: 46 MG/DL
HGB BLD-MCNC: 14.7 G/DL (ref 12–17)
IMM GRANULOCYTES # BLD AUTO: 0.02 THOUSAND/UL (ref 0–0.2)
IMM GRANULOCYTES NFR BLD AUTO: 0 % (ref 0–2)
LDLC SERPL CALC-MCNC: 53 MG/DL (ref 0–100)
LYMPHOCYTES # BLD AUTO: 1.81 THOUSANDS/ÂΜL (ref 0.6–4.47)
LYMPHOCYTES NFR BLD AUTO: 29 % (ref 14–44)
MCH RBC QN AUTO: 31.7 PG (ref 26.8–34.3)
MCHC RBC AUTO-ENTMCNC: 33.1 G/DL (ref 31.4–37.4)
MCV RBC AUTO: 96 FL (ref 82–98)
MONOCYTES # BLD AUTO: 0.51 THOUSAND/ÂΜL (ref 0.17–1.22)
MONOCYTES NFR BLD AUTO: 8 % (ref 4–12)
NEUTROPHILS # BLD AUTO: 3.4 THOUSANDS/ÂΜL (ref 1.85–7.62)
NEUTS SEG NFR BLD AUTO: 54 % (ref 43–75)
NRBC BLD AUTO-RTO: 0 /100 WBCS
PLATELET # BLD AUTO: 163 THOUSANDS/UL (ref 149–390)
PMV BLD AUTO: 11.2 FL (ref 8.9–12.7)
POTASSIUM SERPL-SCNC: 4.5 MMOL/L (ref 3.5–5.3)
PROT SERPL-MCNC: 7.3 G/DL (ref 6.4–8.4)
PSA SERPL-MCNC: 0.47 NG/ML (ref 0–4)
RBC # BLD AUTO: 4.63 MILLION/UL (ref 3.88–5.62)
SODIUM SERPL-SCNC: 141 MMOL/L (ref 135–147)
TRIGL SERPL-MCNC: 98 MG/DL
URATE SERPL-MCNC: 5.2 MG/DL (ref 3.5–8.5)
WBC # BLD AUTO: 6.31 THOUSAND/UL (ref 4.31–10.16)

## 2023-07-24 PROCEDURE — 80061 LIPID PANEL: CPT

## 2023-07-24 PROCEDURE — 36415 COLL VENOUS BLD VENIPUNCTURE: CPT

## 2023-07-24 PROCEDURE — 84550 ASSAY OF BLOOD/URIC ACID: CPT

## 2023-07-24 PROCEDURE — G0103 PSA SCREENING: HCPCS

## 2023-07-24 PROCEDURE — 83036 HEMOGLOBIN GLYCOSYLATED A1C: CPT

## 2023-07-24 PROCEDURE — 80053 COMPREHEN METABOLIC PANEL: CPT

## 2023-07-24 PROCEDURE — 85025 COMPLETE CBC W/AUTO DIFF WBC: CPT

## 2023-08-17 DIAGNOSIS — E79.0 ELEVATED URIC ACID IN BLOOD: ICD-10-CM

## 2023-08-17 RX ORDER — ALLOPURINOL 100 MG/1
200 TABLET ORAL DAILY
Qty: 180 TABLET | Refills: 1 | Status: SHIPPED | OUTPATIENT
Start: 2023-08-17

## 2023-09-13 ENCOUNTER — TELEPHONE (OUTPATIENT)
Dept: INTERNAL MEDICINE CLINIC | Facility: OTHER | Age: 68
End: 2023-09-13

## 2023-09-13 DIAGNOSIS — I10 BENIGN ESSENTIAL HYPERTENSION: ICD-10-CM

## 2023-09-13 RX ORDER — LISINOPRIL 30 MG/1
30 TABLET ORAL DAILY
Qty: 90 TABLET | Refills: 1 | Status: SHIPPED | OUTPATIENT
Start: 2023-09-13

## 2023-09-13 NOTE — TELEPHONE ENCOUNTER
My name is Steven Copeland's YOB: 1955. I need a refill on my lisinopril 30 milligrams 1 tablet a day and my phone number is [phone]. Thank you.

## 2023-10-18 DIAGNOSIS — I10 BENIGN ESSENTIAL HYPERTENSION: ICD-10-CM

## 2023-10-18 RX ORDER — DOXAZOSIN 8 MG/1
8 TABLET ORAL DAILY
Qty: 90 TABLET | Refills: 1 | Status: SHIPPED | OUTPATIENT
Start: 2023-10-18

## 2023-12-12 DIAGNOSIS — E78.5 HYPERLIPIDEMIA, UNSPECIFIED HYPERLIPIDEMIA TYPE: ICD-10-CM

## 2023-12-12 RX ORDER — ATORVASTATIN CALCIUM 20 MG/1
20 TABLET, FILM COATED ORAL DAILY
Qty: 90 TABLET | Refills: 1 | Status: SHIPPED | OUTPATIENT
Start: 2023-12-12

## 2023-12-28 ENCOUNTER — OFFICE VISIT (OUTPATIENT)
Age: 68
End: 2023-12-28
Payer: COMMERCIAL

## 2023-12-28 VITALS
HEIGHT: 71 IN | DIASTOLIC BLOOD PRESSURE: 72 MMHG | HEART RATE: 102 BPM | TEMPERATURE: 97.8 F | OXYGEN SATURATION: 99 % | WEIGHT: 251.6 LBS | BODY MASS INDEX: 35.22 KG/M2 | SYSTOLIC BLOOD PRESSURE: 120 MMHG

## 2023-12-28 DIAGNOSIS — Z00.00 MEDICARE ANNUAL WELLNESS VISIT, SUBSEQUENT: ICD-10-CM

## 2023-12-28 DIAGNOSIS — E78.00 HYPERCHOLESTEROLEMIA: ICD-10-CM

## 2023-12-28 DIAGNOSIS — Z12.5 SCREENING FOR PROSTATE CANCER: ICD-10-CM

## 2023-12-28 DIAGNOSIS — Z87.39 HISTORY OF GOUT: ICD-10-CM

## 2023-12-28 DIAGNOSIS — Z23 ENCOUNTER FOR IMMUNIZATION: ICD-10-CM

## 2023-12-28 DIAGNOSIS — M17.12 TRICOMPARTMENT OSTEOARTHRITIS OF LEFT KNEE: ICD-10-CM

## 2023-12-28 DIAGNOSIS — N18.31 STAGE 3A CHRONIC KIDNEY DISEASE (HCC): ICD-10-CM

## 2023-12-28 DIAGNOSIS — R73.03 PREDIABETES: ICD-10-CM

## 2023-12-28 DIAGNOSIS — E79.0 ELEVATED URIC ACID IN BLOOD: ICD-10-CM

## 2023-12-28 DIAGNOSIS — Z13.0 SCREENING FOR DEFICIENCY ANEMIA: ICD-10-CM

## 2023-12-28 DIAGNOSIS — I10 BENIGN ESSENTIAL HYPERTENSION: Primary | ICD-10-CM

## 2023-12-28 PROCEDURE — G0008 ADMIN INFLUENZA VIRUS VAC: HCPCS

## 2023-12-28 PROCEDURE — G0439 PPPS, SUBSEQ VISIT: HCPCS | Performed by: NURSE PRACTITIONER

## 2023-12-28 PROCEDURE — 99214 OFFICE O/P EST MOD 30 MIN: CPT | Performed by: NURSE PRACTITIONER

## 2023-12-28 PROCEDURE — 90662 IIV NO PRSV INCREASED AG IM: CPT

## 2023-12-28 RX ORDER — ALLOPURINOL 100 MG/1
100 TABLET ORAL DAILY
Start: 2023-12-28

## 2023-12-28 NOTE — ASSESSMENT & PLAN NOTE
- influenza vaccine given today. Only wants one vaccine today  - plan for prevnar 20 at 6 month follow up  - recommend Tdap at pharmacy

## 2023-12-28 NOTE — PATIENT INSTRUCTIONS
Decrease allopurinol to 100mg daily  Check labs in 6 months  Follow up in 6 months     Medicare Preventive Visit Patient Instructions  Thank you for completing your Welcome to Medicare Visit or Medicare Annual Wellness Visit today. Your next wellness visit will be due in one year (12/28/2024).  The screening/preventive services that you may require over the next 5-10 years are detailed below. Some tests may not apply to you based off risk factors and/or age. Screening tests ordered at today's visit but not completed yet may show as past due. Also, please note that scanned in results may not display below.  Preventive Screenings:  Service Recommendations Previous Testing/Comments   Colorectal Cancer Screening  Colonoscopy    Fecal Occult Blood Test (FOBT)/Fecal Immunochemical Test (FIT)  Fecal DNA/Cologuard Test  Flexible Sigmoidoscopy Age: 45-75 years old   Colonoscopy: every 10 years (May be performed more frequently if at higher risk)  OR  FOBT/FIT: every 1 year  OR  Cologuard: every 3 years  OR  Sigmoidoscopy: every 5 years  Screening may be recommended earlier than age 45 if at higher risk for colorectal cancer. Also, an individualized decision between you and your healthcare provider will decide whether screening between the ages of 76-85 would be appropriate. Colonoscopy: 07/29/2021  FOBT/FIT: Not on file  Cologuard: Not on file  Sigmoidoscopy: Not on file    Screening Current     Prostate Cancer Screening Individualized decision between patient and health care provider in men between ages of 55-69   Medicare will cover every 12 months beginning on the day after your 50th birthday PSA: 0.47 ng/mL     Screening Current     Hepatitis C Screening Once for adults born between 1945 and 1965  More frequently in patients at high risk for Hepatitis C Hep C Antibody: 09/26/2018    Screening Current   Diabetes Screening 1-2 times per year if you're at risk for diabetes or have pre-diabetes Fasting glucose: 103 mg/dL  (7/24/2023)  A1C: 5.7 % (7/24/2023)  Screening Current   Cholesterol Screening Once every 5 years if you don't have a lipid disorder. May order more often based on risk factors. Lipid panel: 07/24/2023  Screening Not Indicated  History Lipid Disorder      Other Preventive Screenings Covered by Medicare:  Abdominal Aortic Aneurysm (AAA) Screening: covered once if your at risk. You're considered to be at risk if you have a family history of AAA or a male between the age of 65-75 who smoking at least 100 cigarettes in your lifetime.  Lung Cancer Screening: covers low dose CT scan once per year if you meet all of the following conditions: (1) Age 55-77; (2) No signs or symptoms of lung cancer; (3) Current smoker or have quit smoking within the last 15 years; (4) You have a tobacco smoking history of at least 20 pack years (packs per day x number of years you smoked); (5) You get a written order from a healthcare provider.  Glaucoma Screening: covered annually if you're considered high risk: (1) You have diabetes OR (2) Family history of glaucoma OR (3)  aged 50 and older OR (4)  American aged 65 and older  Osteoporosis Screening: covered every 2 years if you meet one of the following conditions: (1) Have a vertebral abnormality; (2) On glucocorticoid therapy for more than 3 months; (3) Have primary hyperparathyroidism; (4) On osteoporosis medications and need to assess response to drug therapy.  HIV Screening: covered annually if you're between the age of 15-65. Also covered annually if you are younger than 15 and older than 65 with risk factors for HIV infection. For pregnant patients, it is covered up to 3 times per pregnancy.    Immunizations:  Immunization Recommendations   Influenza Vaccine Annual influenza vaccination during flu season is recommended for all persons aged >= 6 months who do not have contraindications   Pneumococcal Vaccine   * Pneumococcal conjugate vaccine = PCV13 (Prevnar  13), PCV15 (Vaxneuvance), PCV20 (Prevnar 20)  * Pneumococcal polysaccharide vaccine = PPSV23 (Pneumovax) Adults 19-65 yo with certain risk factors or if 65+ yo  If never received any pneumonia vaccine: recommend Prevnar 20 (PCV20)  Give PCV20 if previously received 1 dose of PCV13 or PPSV23   Hepatitis B Vaccine 3 dose series if at intermediate or high risk (ex: diabetes, end stage renal disease, liver disease)   Respiratory syncytial virus (RSV) Vaccine - COVERED BY MEDICARE PART D  * RSVPreF3 (Arexvy) CDC recommends that adults 60 years of age and older may receive a single dose of RSV vaccine using shared clinical decision-making (SCDM)   Tetanus (Td) Vaccine - COST NOT COVERED BY MEDICARE PART B Following completion of primary series, a booster dose should be given every 10 years to maintain immunity against tetanus. Td may also be given as tetanus wound prophylaxis.   Tdap Vaccine - COST NOT COVERED BY MEDICARE PART B Recommended at least once for all adults. For pregnant patients, recommended with each pregnancy.   Shingles Vaccine (Shingrix) - COST NOT COVERED BY MEDICARE PART B  2 shot series recommended in those 19 years and older who have or will have weakened immune systems or those 50 years and older     Health Maintenance Due:      Topic Date Due    Colorectal Cancer Screening  07/29/2024    Hepatitis C Screening  Completed     Immunizations Due:      Topic Date Due    Pneumococcal Vaccine: 65+ Years (2 - PPSV23 or PCV20) 08/27/2022    Influenza Vaccine (1) 09/01/2023    COVID-19 Vaccine (3 - 2023-24 season) 09/01/2023     Advance Directives   What are advance directives?  Advance directives are legal documents that state your wishes and plans for medical care. These plans are made ahead of time in case you lose your ability to make decisions for yourself. Advance directives can apply to any medical decision, such as the treatments you want, and if you want to donate organs.   What are the types of  advance directives?  There are many types of advance directives, and each state has rules about how to use them. You may choose a combination of any of the following:  Living will:  This is a written record of the treatment you want. You can also choose which treatments you do not want, which to limit, and which to stop at a certain time. This includes surgery, medicine, IV fluid, and tube feedings.   Durable power of  for healthcare (DPAHC):  This is a written record that states who you want to make healthcare choices for you when you are unable to make them for yourself. This person, called a proxy, is usually a family member or a friend. You may choose more than 1 proxy.  Do not resuscitate (DNR) order:  A DNR order is used in case your heart stops beating or you stop breathing. It is a request not to have certain forms of treatment, such as CPR. A DNR order may be included in other types of advance directives.  Medical directive:  This covers the care that you want if you are in a coma, near death, or unable to make decisions for yourself. You can list the treatments you want for each condition. Treatment may include pain medicine, surgery, blood transfusions, dialysis, IV or tube feedings, and a ventilator (breathing machine).  Values history:  This document has questions about your views, beliefs, and how you feel and think about life. This information can help others choose the care that you would choose.  Why are advance directives important?  An advance directive helps you control your care. Although spoken wishes may be used, it is better to have your wishes written down. Spoken wishes can be misunderstood, or not followed. Treatments may be given even if you do not want them. An advance directive may make it easier for your family to make difficult choices about your care.   Weight Management   Why it is important to manage your weight:  Being overweight increases your risk of health conditions  such as heart disease, high blood pressure, type 2 diabetes, and certain types of cancer. It can also increase your risk for osteoarthritis, sleep apnea, and other respiratory problems. Aim for a slow, steady weight loss. Even a small amount of weight loss can lower your risk of health problems.  How to lose weight safely:  A safe and healthy way to lose weight is to eat fewer calories and get regular exercise. You can lose up about 1 pound a week by decreasing the number of calories you eat by 500 calories each day.   Healthy meal plan for weight management:  A healthy meal plan includes a variety of foods, contains fewer calories, and helps you stay healthy. A healthy meal plan includes the following:  Eat whole-grain foods more often.  A healthy meal plan should contain fiber. Fiber is the part of grains, fruits, and vegetables that is not broken down by your body. Whole-grain foods are healthy and provide extra fiber in your diet. Some examples of whole-grain foods are whole-wheat breads and pastas, oatmeal, brown rice, and bulgur.  Eat a variety of vegetables every day.  Include dark, leafy greens such as spinach, kale, patrica greens, and mustard greens. Eat yellow and orange vegetables such as carrots, sweet potatoes, and winter squash.   Eat a variety of fruits every day.  Choose fresh or canned fruit (canned in its own juice or light syrup) instead of juice. Fruit juice has very little or no fiber.  Eat low-fat dairy foods.  Drink fat-free (skim) milk or 1% milk. Eat fat-free yogurt and low-fat cottage cheese. Try low-fat cheeses such as mozzarella and other reduced-fat cheeses.  Choose meat and other protein foods that are low in fat.  Choose beans or other legumes such as split peas or lentils. Choose fish, skinless poultry (chicken or turkey), or lean cuts of red meat (beef or pork). Before you cook meat or poultry, cut off any visible fat.   Use less fat and oil.  Try baking foods instead of frying  them. Add less fat, such as margarine, sour cream, regular salad dressing and mayonnaise to foods. Eat fewer high-fat foods. Some examples of high-fat foods include french fries, doughnuts, ice cream, and cakes.  Eat fewer sweets.  Limit foods and drinks that are high in sugar. This includes candy, cookies, regular soda, and sweetened drinks.  Exercise:  Exercise at least 30 minutes per day on most days of the week. Some examples of exercise include walking, biking, dancing, and swimming. You can also fit in more physical activity by taking the stairs instead of the elevator or parking farther away from stores. Ask your healthcare provider about the best exercise plan for you.      © Copyright [employer] 2018 Information is for End User's use only and may not be sold, redistributed or otherwise used for commercial purposes. All illustrations and images included in CareNotes® are the copyrighted property of [employer] Inc. or [employer]

## 2023-12-28 NOTE — ASSESSMENT & PLAN NOTE
Lab Results   Component Value Date    EGFR 57 07/24/2023    EGFR 58 10/10/2022    EGFR 57 05/20/2022    CREATININE 1.28 07/24/2023    CREATININE 1.27 10/10/2022    CREATININE 1.29 05/20/2022     - continue to monitor  - avoid nephrotoxins when possible  - stay adequately hydrated

## 2023-12-28 NOTE — PROGRESS NOTES
Assessment and Plan:     Problem List Items Addressed This Visit       Benign essential hypertension - Primary     - controlled on doxazosin 8mg daily and lisinopril 30mg daily          Hypercholesterolemia     - at goal  - continue atorvastatin 20mg, ASA 81mg and fish oil 1000mg daily          Relevant Orders    Lipid Panel with Direct LDL reflex    History of gout     - last uric acid 5.2  - no recent gout flares.   - pt would like to possibly discontinue his allopurinol. Discussed with him that we will cut him back from 200mg to 100mg and recheck uric acid in 6 months          Relevant Orders    Uric acid    Elevated uric acid in blood    Relevant Medications    allopurinol (ZYLOPRIM) 100 mg tablet    Stage 3a chronic kidney disease (HCC)     Lab Results   Component Value Date    EGFR 57 07/24/2023    EGFR 58 10/10/2022    EGFR 57 05/20/2022    CREATININE 1.28 07/24/2023    CREATININE 1.27 10/10/2022    CREATININE 1.29 05/20/2022     - continue to monitor  - avoid nephrotoxins when possible  - stay adequately hydrated          Relevant Orders    Comprehensive metabolic panel    Tricompartment osteoarthritis of left knee     - follow up with ortho, interested in another injection          Relevant Orders    Ambulatory Referral to Orthopedic Surgery    Medicare annual wellness visit, subsequent     - influenza vaccine given today. Only wants one vaccine today  - plan for prevnar 20 at 6 month follow up  - recommend Tdap at pharmacy            Prediabetes     - Hba1c 5.7  - discussed diet modifications  - repeat in 6 months          Relevant Orders    Hemoglobin A1C    Comprehensive metabolic panel     Other Visit Diagnoses       Screening for deficiency anemia        Relevant Orders    CBC and differential    Screening for prostate cancer        Relevant Orders    PSA, Total Screen    Encounter for immunization        Relevant Orders    influenza vaccine, high-dose, PF 0.7 mL (FLUZONE HIGH-DOSE) (Completed)             Depression Screening and Follow-up Plan: Patient was screened for depression during today's encounter. They screened negative with a PHQ-2 score of 0.      Preventive health issues were discussed with patient, and age appropriate screening tests were ordered as noted in patient's After Visit Summary.  Personalized health advice and appropriate referrals for health education or preventive services given if needed, as noted in patient's After Visit Summary.     History of Present Illness:     Patient presents for a Medicare Wellness Visit and 6 month follow up  Last labs completed July 24, 2023  Hba1c 5.7     PSA normal HTN- he does not monitor his BP at home.  He is compliant with his medications.  Currently on doxazosin 8 mg daily and lisinopril 30 mg daily     Gout - no gout flares.  Currently on allopurinol 200 mg daily. Uric acid 5.2     HLD- lipid panel from July 2024 at goal. Total cholesterol 119, triglycerides 98, HDL 46, LDL 53. compliant with atorvastatin 20 mg and aspirin 81 mg.  Takes fish oil 1000 mg daily    HPI   Patient Care Team:  Terry Enriquez PA-C as PCP - General (Internal Medicine)  Tahmina Camejo MD as PCP - [facility]CP-[facility]  [facility]     Review of Systems:     Review of Systems   Constitutional:  Negative for activity change, appetite change and unexpected weight change.   Eyes:  Negative for visual disturbance.   Respiratory:  Negative for cough, chest tightness and shortness of breath.    Cardiovascular:  Negative for chest pain and leg swelling.   Musculoskeletal:  Positive for arthralgias (left knee).   Neurological:  Negative for headaches.        Problem List:     Patient Active Problem List   Diagnosis    Benign essential hypertension    Hypercholesterolemia    History of gout    Class 2 obesity due to excess calories with body mass index (BMI) of 36.0 to 36.9 in adult    Family history of malignant neoplasm of prostate     Other hemorrhoids    Elevated uric acid in blood    Screen for colon cancer    Need for influenza vaccination    Polyp of colon    Hx of colonic polyps    Vaccine counseling    Stage 3a chronic kidney disease (HCC)    Elevated coronary artery calcium score    Tricompartment osteoarthritis of left knee    Medicare annual wellness visit, subsequent    Prediabetes      Past Medical and Surgical History:     Past Medical History:   Diagnosis Date    Bereavement 7/31/2020    Blurred vision     Last assessed - 1/28/15    CAP (community acquired pneumonia)     Last assessed - 9/20/16    Elevated bilirubin 11/6/2020    Hemorrhoids     Last assessed - 11/23/15    HTN (hypertension)     Hypercholesterolemia     IFG (impaired fasting glucose)     Last assessed - 11/18/14    Rotator cuff tendinitis     Last assessed - 6/17/15     Past Surgical History:   Procedure Laterality Date    APPENDECTOMY      Resolved - 1/24/09    COLONOSCOPY      2021    WISDOM TOOTH EXTRACTION        Family History:     Family History   Problem Relation Age of Onset    Hyperlipidemia Mother     Hypertension Mother     Pulmonary embolism Mother     Diabetes type II Mother     Hyperlipidemia Father     Hypertension Father     Prostate cancer Father     Diabetes type II Father       Social History:     Social History     Socioeconomic History    Marital status:      Spouse name: None    Number of children: None    Years of education: None    Highest education level: None   Occupational History    Occupation: [employer]     Employer: [employer]   Tobacco Use    Smoking status: Never    Smokeless tobacco: Never   Vaping Use    Vaping status: Never Used   Substance and Sexual Activity    Alcohol use: Yes     Alcohol/week: 1.0 standard drink of alcohol     Types: 1 Cans of beer per week     Comment: 4 weekly    Drug use: No    Sexual activity: Yes   Other Topics Concern    None   Social History Narrative    Travel Hx - Pt denies being out of the country  during 1/2014-11/18/2014     Social Determinants of Health     Financial Resource Strain: Low Risk  (12/28/2023)    Overall Financial Resource Strain (CARDIA)     Difficulty of Paying Living Expenses: Not hard at all   Food Insecurity: Not on file   Transportation Needs: No Transportation Needs (12/28/2023)    PRAPARE - Transportation     Lack of Transportation (Medical): No     Lack of Transportation (Non-Medical): No   Physical Activity: Not on file   Stress: Not on file   Social Connections: Not on file   Intimate Partner Violence: Not on file   Housing Stability: Not on file      Medications and Allergies:     Current Outpatient Medications   Medication Sig Dispense Refill    allopurinol (ZYLOPRIM) 100 mg tablet Take 1 tablet (100 mg total) by mouth daily      aspirin 81 MG tablet Take 1 tablet by mouth daily      atorvastatin (LIPITOR) 20 mg tablet Take 1 tablet (20 mg total) by mouth daily 90 tablet 1    doxazosin (CARDURA) 8 MG tablet Take 1 tablet (8 mg total) by mouth daily 90 tablet 1    hydrocortisone (ANUSOL-HC) 2.5 % rectal cream Apply topically 2 (two) times a day as needed for hemorrhoids 28 g 1    lisinopril (ZESTRIL) 30 mg tablet Take 1 tablet (30 mg total) by mouth daily 90 tablet 1    Omega-3 1000 MG CAPS Take 1 capsule by mouth daily         No current facility-administered medications for this visit.     Allergies   Allergen Reactions    Penicillins Hives    Pollen Extract       Immunizations:     Immunization History   Administered Date(s) Administered    COVID-19 PFIZER VACCINE 0.3 ML IM 03/19/2021, 04/09/2021    INFLUENZA 10/14/2022    Influenza Quadrivalent Preservative Free 3 years and older IM 11/29/2017    Influenza Quadrivalent, 6-35 Months IM 11/21/2016    Influenza, high dose seasonal 0.7 mL 11/06/2020, 01/28/2022, 10/14/2022, 12/28/2023    Pneumococcal Conjugate 13-Valent 08/27/2021    Td (adult), adsorbed 09/01/2007      Health Maintenance:         Topic Date Due    Colorectal Cancer  Screening  07/29/2024    Hepatitis C Screening  Completed         Topic Date Due    Pneumococcal Vaccine: 65+ Years (2 - PPSV23 or PCV20) 08/27/2022    COVID-19 Vaccine (3 - 2023-24 season) 09/01/2023      Medicare Screening Tests and Risk Assessments:     Carter is here for his Subsequent Wellness visit.     Health Risk Assessment:   Patient rates overall health as very good. Patient feels that their physical health rating is same. Patient is very satisfied with their life. Eyesight was rated as same. Hearing was rated as same. Patient feels that their emotional and mental health rating is much better. Patients states they are never, rarely angry. Patient states they are sometimes unusually tired/fatigued. Pain experienced in the last 7 days has been some. Patient's pain rating has been 3/10. Patient states that he has experienced no weight loss or gain in last 6 months.     Depression Screening:   PHQ-2 Score: 0      Fall Risk Screening:   In the past year, patient has experienced: no history of falling in past year      Home Safety:  Patient does not have trouble with stairs inside or outside of their home. Patient has working smoke alarms and has working carbon monoxide detector. Home safety hazards include: none.     Nutrition:   Current diet is Regular and Limited junk food.     Medications:   Patient is currently taking over-the-counter supplements. OTC medications include: see medication list. Patient is able to manage medications.     Activities of Daily Living (ADLs)/Instrumental Activities of Daily Living (IADLs):   Walk and transfer into and out of bed and chair?: Yes  Dress and groom yourself?: Yes    Bathe or shower yourself?: Yes    Feed yourself? Yes  Do your laundry/housekeeping?: Yes  Manage your money, pay your bills and track your expenses?: Yes  Make your own meals?: Yes    Do your own shopping?: Yes    Previous Hospitalizations:   Any hospitalizations or ED visits within the last 12 months?: No   "    Advance Care Planning:   Living will: Yes    Advanced directive: Yes    ACP document given: Yes      PREVENTIVE SCREENINGS      Cardiovascular Screening:    General: Screening Not Indicated and History Lipid Disorder      Diabetes Screening:     General: Screening Current      Colorectal Cancer Screening:     General: Screening Current      Prostate Cancer Screening:    General: Screening Current      Osteoporosis Screening:    General: Screening Not Indicated      Abdominal Aortic Aneurysm (AAA) Screening:    Risk factors include: age between 65-76 yo        General: Screening Not Indicated      Lung Cancer Screening:     General: Screening Not Indicated      Hepatitis C Screening:    General: Screening Current    Screening, Brief Intervention, and Referral to Treatment (SBIRT)    Screening  Typical number of drinks in a day: 0  Typical number of drinks in a week: 4  Interpretation: Low risk drinking behavior.    No results found.     Physical Exam:     /72 (BP Location: Left arm, Patient Position: Sitting, Cuff Size: Standard)   Pulse 102   Temp 97.8 °F (36.6 °C) (Temporal)   Ht 5' 11\" (1.803 m)   Wt 114 kg (251 lb 9.6 oz)   SpO2 99%   BMI 35.09 kg/m²     Physical Exam  Vitals reviewed.   Constitutional:       General: He is not in acute distress.     Appearance: Normal appearance. He is not diaphoretic.   HENT:      Head: Normocephalic and atraumatic.   Eyes:      Extraocular Movements: Extraocular movements intact.      Conjunctiva/sclera: Conjunctivae normal.      Pupils: Pupils are equal, round, and reactive to light.   Neck:      Vascular: No carotid bruit.   Cardiovascular:      Rate and Rhythm: Normal rate and regular rhythm.      Heart sounds: Normal heart sounds. No murmur heard.  Pulmonary:      Effort: Pulmonary effort is normal. No respiratory distress.      Breath sounds: Normal breath sounds. No wheezing, rhonchi or rales.   Musculoskeletal:      Right lower leg: No edema.      Left " lower leg: No edema.   Neurological:      Mental Status: He is alert and oriented to person, place, and time. Mental status is at baseline.   Psychiatric:         Mood and Affect: Mood normal.         Behavior: Behavior normal.         Thought Content: Thought content normal.         Judgment: Judgment normal.          SUDHEER Banda

## 2023-12-28 NOTE — ASSESSMENT & PLAN NOTE
- last uric acid 5.2  - no recent gout flares.   - pt would like to possibly discontinue his allopurinol. Discussed with him that we will cut him back from 200mg to 100mg and recheck uric acid in 6 months

## 2024-01-05 ENCOUNTER — OFFICE VISIT (OUTPATIENT)
Dept: OBGYN CLINIC | Facility: CLINIC | Age: 69
End: 2024-01-05
Payer: COMMERCIAL

## 2024-01-05 DIAGNOSIS — M17.12 TRICOMPARTMENT OSTEOARTHRITIS OF LEFT KNEE: Primary | ICD-10-CM

## 2024-01-05 PROCEDURE — 99213 OFFICE O/P EST LOW 20 MIN: CPT | Performed by: ORTHOPAEDIC SURGERY

## 2024-01-05 PROCEDURE — 20610 DRAIN/INJ JOINT/BURSA W/O US: CPT

## 2024-01-05 RX ORDER — LIDOCAINE HYDROCHLORIDE 10 MG/ML
1 INJECTION, SOLUTION INFILTRATION; PERINEURAL
Status: COMPLETED | OUTPATIENT
Start: 2024-01-05 | End: 2024-01-05

## 2024-01-05 RX ORDER — BETAMETHASONE SODIUM PHOSPHATE AND BETAMETHASONE ACETATE 3; 3 MG/ML; MG/ML
6 INJECTION, SUSPENSION INTRA-ARTICULAR; INTRALESIONAL; INTRAMUSCULAR; SOFT TISSUE
Status: COMPLETED | OUTPATIENT
Start: 2024-01-05 | End: 2024-01-05

## 2024-01-05 RX ORDER — BUPIVACAINE HYDROCHLORIDE 2.5 MG/ML
1 INJECTION, SOLUTION INFILTRATION; PERINEURAL
Status: COMPLETED | OUTPATIENT
Start: 2024-01-05 | End: 2024-01-05

## 2024-01-05 RX ADMIN — BETAMETHASONE SODIUM PHOSPHATE AND BETAMETHASONE ACETATE 6 MG: 3; 3 INJECTION, SUSPENSION INTRA-ARTICULAR; INTRALESIONAL; INTRAMUSCULAR; SOFT TISSUE at 09:45

## 2024-01-05 RX ADMIN — BUPIVACAINE HYDROCHLORIDE 1 ML: 2.5 INJECTION, SOLUTION INFILTRATION; PERINEURAL at 09:45

## 2024-01-05 RX ADMIN — LIDOCAINE HYDROCHLORIDE 1 ML: 10 INJECTION, SOLUTION INFILTRATION; PERINEURAL at 09:45

## 2024-01-05 NOTE — PROGRESS NOTES
Assessment:  1. Tricompartment osteoarthritis of left knee  Ambulatory Referral to Orthopedic Surgery    Injection Procedure Prior Authorization    Large joint arthrocentesis: L knee        Patient Active Problem List   Diagnosis    Benign essential hypertension    Hypercholesterolemia    History of gout    Class 2 obesity due to excess calories with body mass index (BMI) of 36.0 to 36.9 in adult    Family history of malignant neoplasm of prostate    Other hemorrhoids    Elevated uric acid in blood    Screen for colon cancer    Need for influenza vaccination    Polyp of colon    Hx of colonic polyps    Vaccine counseling    Stage 3a chronic kidney disease (HCC)    Elevated coronary artery calcium score    Tricompartment osteoarthritis of left knee    Medicare annual wellness visit, subsequent    Prediabetes       Plan:    68 y.o. male  with tricompartmental arthritis of left knee.     Discussed pathology of underlying diagnosis with patient.   Discussed steroid injection of left knee secondary to pain and inflammation. Discussed side effects and risks. Patient agreeable. Injection administered.   Patient had great benefit from Euflexxa injections in the past. Patient would like to repeat course of Euflexxa. Prior-authorization submitted today.     The assessment and plan were formulated by Dr. Chavez and I assisted in carrying it out.    Subjective:   Patient ID: Carter Grover is a 68 y.o. male .    HPI    Patient presents to the office for follow up of left knee pain. Patient repeated course of Euflexxa on 07/07/2022. Patient notes benefit from these injections for 1.5 years. Since the last visit, the patient reports continued pain in the medial aspect of left knee. Notes increase in pain with use. Reports 6/10 pain. Notes stiffness in left knee in the morning.      The following portions of the patient's history were reviewed and updated as appropriate: allergies, current medications, past family history, past social  history, past surgical history and problem list.    Social History     Socioeconomic History    Marital status:      Spouse name: Not on file    Number of children: Not on file    Years of education: Not on file    Highest education level: Not on file   Occupational History    Occupation: [employer]     Employer: [employer]   Tobacco Use    Smoking status: Never    Smokeless tobacco: Never   Vaping Use    Vaping status: Never Used   Substance and Sexual Activity    Alcohol use: Yes     Alcohol/week: 1.0 standard drink of alcohol     Types: 1 Cans of beer per week     Comment: 4 weekly    Drug use: No    Sexual activity: Yes   Other Topics Concern    Not on file   Social History Narrative    Travel Hx - Pt denies being out of the country during 1/2014-11/18/2014     Social Determinants of Health     Financial Resource Strain: Low Risk  (12/28/2023)    Overall Financial Resource Strain (CARDIA)     Difficulty of Paying Living Expenses: Not hard at all   Food Insecurity: Not on file   Transportation Needs: No Transportation Needs (12/28/2023)    PRAPARE - Transportation     Lack of Transportation (Medical): No     Lack of Transportation (Non-Medical): No   Physical Activity: Not on file   Stress: Not on file   Social Connections: Not on file   Intimate Partner Violence: Not on file   Housing Stability: Not on file     Past Medical History:   Diagnosis Date    Bereavement 7/31/2020    Blurred vision     Last assessed - 1/28/15    CAP (community acquired pneumonia)     Last assessed - 9/20/16    Elevated bilirubin 11/6/2020    Hemorrhoids     Last assessed - 11/23/15    HTN (hypertension)     Hypercholesterolemia     IFG (impaired fasting glucose)     Last assessed - 11/18/14    Rotator cuff tendinitis     Last assessed - 6/17/15     Past Surgical History:   Procedure Laterality Date    APPENDECTOMY      Resolved - 1/24/09    COLONOSCOPY      2021    WISDOM TOOTH EXTRACTION       Allergies   Allergen Reactions     Penicillins Hives    Pollen Extract      Current Outpatient Medications on File Prior to Visit   Medication Sig Dispense Refill    allopurinol (ZYLOPRIM) 100 mg tablet Take 1 tablet (100 mg total) by mouth daily      aspirin 81 MG tablet Take 1 tablet by mouth daily      atorvastatin (LIPITOR) 20 mg tablet Take 1 tablet (20 mg total) by mouth daily 90 tablet 1    doxazosin (CARDURA) 8 MG tablet Take 1 tablet (8 mg total) by mouth daily 90 tablet 1    hydrocortisone (ANUSOL-HC) 2.5 % rectal cream Apply topically 2 (two) times a day as needed for hemorrhoids 28 g 1    lisinopril (ZESTRIL) 30 mg tablet Take 1 tablet (30 mg total) by mouth daily 90 tablet 1    Omega-3 1000 MG CAPS Take 1 capsule by mouth daily         No current facility-administered medications on file prior to visit.       Review of Systems   Constitutional:  Negative for chills and fever.   HENT:  Negative for ear pain and sore throat.    Eyes:  Negative for pain and visual disturbance.   Respiratory:  Negative for cough and shortness of breath.    Cardiovascular:  Negative for chest pain and palpitations.   Gastrointestinal:  Negative for abdominal pain and vomiting.   Genitourinary:  Negative for dysuria and hematuria.   Musculoskeletal:  Negative for arthralgias and back pain.   Skin:  Negative for color change and rash.   Neurological:  Negative for seizures and syncope.   All other systems reviewed and are negative.    See HPi    Objective:    There were no vitals filed for this visit.    Physical Exam  Vitals and nursing note reviewed.   Constitutional:       General: He is not in acute distress.     Appearance: He is well-developed.   HENT:      Head: Normocephalic and atraumatic.   Eyes:      Conjunctiva/sclera: Conjunctivae normal.   Cardiovascular:      Rate and Rhythm: Normal rate and regular rhythm.      Heart sounds: No murmur heard.  Pulmonary:      Effort: Pulmonary effort is normal. No respiratory distress.      Breath sounds: Normal  "breath sounds.   Abdominal:      Palpations: Abdomen is soft.      Tenderness: There is no abdominal tenderness.   Musculoskeletal:         General: No swelling.      Cervical back: Neck supple.   Skin:     General: Skin is warm and dry.      Capillary Refill: Capillary refill takes less than 2 seconds.   Neurological:      Mental Status: He is alert.   Psychiatric:         Mood and Affect: Mood normal.         Left Knee Exam     Tenderness   The patient is experiencing tenderness in the medial joint line.    Range of Motion   Extension:  normal   Flexion:  normal     Other   Erythema: absent  Scars: absent  Sensation: normal  Swelling: none            I have personally reviewed pertinent films in PACS.    Large joint arthrocentesis: L knee  Universal Protocol:  Consent: Verbal consent obtained.  Consent given by: patient  Patient understanding: patient states understanding of the procedure being performed  Patient identity confirmed: verbally with patient  Supporting Documentation  Indications: pain   Procedure Details  Location: knee - L knee  Needle size: 22 G  Ultrasound guidance: no  Approach: anterolateral  Medications administered: 1 mL bupivacaine 0.25 %; 1 mL lidocaine 1 %; 6 mg betamethasone acetate-betamethasone sodium phosphate 6 (3-3) mg/mL    Patient tolerance: patient tolerated the procedure well with no immediate complications  Dressing:  Sterile dressing applied             Portions of the record may have been created with voice recognition software.  Occasional wrong word or \"sound a like\" substitutions may have occurred due to the inherent limitations of voice recognition software.  Read the chart carefully and recognize, using context, where substitutions have occurred.   "

## 2024-01-22 ENCOUNTER — PROCEDURE VISIT (OUTPATIENT)
Dept: OBGYN CLINIC | Facility: CLINIC | Age: 69
End: 2024-01-22
Payer: COMMERCIAL

## 2024-01-22 VITALS — HEIGHT: 71 IN | WEIGHT: 251 LBS | BODY MASS INDEX: 35.14 KG/M2

## 2024-01-22 DIAGNOSIS — M17.12 TRICOMPARTMENT OSTEOARTHRITIS OF LEFT KNEE: Primary | ICD-10-CM

## 2024-01-22 PROCEDURE — 20610 DRAIN/INJ JOINT/BURSA W/O US: CPT | Performed by: PHYSICIAN ASSISTANT

## 2024-01-22 RX ORDER — HYALURONATE SODIUM 10 MG/ML
20 SYRINGE (ML) INTRAARTICULAR
Status: COMPLETED | OUTPATIENT
Start: 2024-01-22 | End: 2024-01-22

## 2024-01-22 RX ADMIN — Medication 20 MG: at 09:45

## 2024-01-22 NOTE — PROGRESS NOTES
"1. Tricompartment osteoarthritis of left knee  Large joint arthrocentesis        Patient is here for his 1st injection of euflexxa into the left knee.     Patient rates their pain as 2/10 today    Physical exam of the knee shows no effusion no ecchymosis.      Large joint arthrocentesis: L knee  Universal Protocol:  Consent given by: patient  Time out: Immediately prior to procedure a \"time out\" was called to verify the correct patient, procedure, equipment, support staff and site/side marked as required.  Site marked: the operative site was marked  Supporting Documentation  Indications: pain   Procedure Details  Location: knee - L knee  Preparation: Patient was prepped and draped in the usual sterile fashion  Needle size: 22 G  Ultrasound guidance: no  Approach: anterolateral  Medications administered: 20 mg Sodium Hyaluronate (Viscosup) 20 MG/2ML    Patient tolerance: patient tolerated the procedure well with no immediate complications  Dressing:  Sterile dressing applied          Patient tolerated procedure follow up 1 week  "

## 2024-01-29 ENCOUNTER — PROCEDURE VISIT (OUTPATIENT)
Dept: OBGYN CLINIC | Facility: CLINIC | Age: 69
End: 2024-01-29
Payer: COMMERCIAL

## 2024-01-29 VITALS — WEIGHT: 251 LBS | BODY MASS INDEX: 35.14 KG/M2 | HEIGHT: 71 IN

## 2024-01-29 DIAGNOSIS — M17.12 TRICOMPARTMENT OSTEOARTHRITIS OF LEFT KNEE: Primary | ICD-10-CM

## 2024-01-29 PROCEDURE — 20610 DRAIN/INJ JOINT/BURSA W/O US: CPT

## 2024-01-29 RX ORDER — HYALURONATE SODIUM 10 MG/ML
20 SYRINGE (ML) INTRAARTICULAR
Status: COMPLETED | OUTPATIENT
Start: 2024-01-29 | End: 2024-01-29

## 2024-01-29 RX ADMIN — Medication 20 MG: at 12:45

## 2024-01-29 NOTE — PROGRESS NOTES
1. Tricompartment osteoarthritis of left knee  Large joint arthrocentesis: L knee        Patient is here for his 2nd injection of Euflexxa into the left knee.     Patient rates their pain as 1/10 today    Physical exam of the knee shows no effusion no ecchymosis.      Large joint arthrocentesis: L knee  Universal Protocol:  Consent: Verbal consent obtained.  Risks and benefits: risks, benefits and alternatives were discussed  Consent given by: patient  Patient understanding: patient states understanding of the procedure being performed  Patient identity confirmed: verbally with patient  Supporting Documentation  Indications: pain   Procedure Details  Location: knee - L knee  Needle size: 22 G  Approach: anterolateral  Medications administered: 20 mg Sodium Hyaluronate (Viscosup) 20 MG/2ML    Patient tolerance: patient tolerated the procedure well with no immediate complications  Dressing:  Sterile dressing applied          Patient tolerated procedure follow up 1 week for 3rd injection of Euflexxa into left knee

## 2024-02-05 ENCOUNTER — PROCEDURE VISIT (OUTPATIENT)
Dept: OBGYN CLINIC | Facility: CLINIC | Age: 69
End: 2024-02-05
Payer: COMMERCIAL

## 2024-02-05 VITALS — HEIGHT: 71 IN | BODY MASS INDEX: 35.14 KG/M2 | WEIGHT: 251 LBS

## 2024-02-05 DIAGNOSIS — M17.12 TRICOMPARTMENT OSTEOARTHRITIS OF LEFT KNEE: Primary | ICD-10-CM

## 2024-02-05 PROCEDURE — 20610 DRAIN/INJ JOINT/BURSA W/O US: CPT

## 2024-02-05 RX ORDER — HYALURONATE SODIUM 10 MG/ML
20 SYRINGE (ML) INTRAARTICULAR
Status: COMPLETED | OUTPATIENT
Start: 2024-02-05 | End: 2024-02-05

## 2024-02-05 RX ADMIN — Medication 20 MG: at 12:45

## 2024-02-05 NOTE — PROGRESS NOTES
1. Tricompartment osteoarthritis of left knee          Patient is here for his 3rd injection of Euflexxa into the left knee.     Patient rates their pain as 4/10 today    Physical exam of the knee shows no effusion no ecchymosis.      Large joint arthrocentesis: L knee  Universal Protocol:  Consent: Verbal consent obtained.  Risks and benefits: risks, benefits and alternatives were discussed  Consent given by: patient  Patient understanding: patient states understanding of the procedure being performed  Patient identity confirmed: verbally with patient  Supporting Documentation  Indications: pain   Procedure Details  Location: knee - L knee  Needle size: 22 G  Ultrasound guidance: no  Approach: anterolateral  Medications administered: 20 mg Sodium Hyaluronate (Viscosup) 20 MG/2ML    Patient tolerance: patient tolerated the procedure well with no immediate complications  Dressing:  Sterile dressing applied            Patient tolerated procedure follow up 1 month if needed for steroid injection of left knee.

## 2024-02-21 PROBLEM — Z12.11 SCREEN FOR COLON CANCER: Status: RESOLVED | Noted: 2020-11-06 | Resolved: 2024-02-21

## 2024-02-21 PROBLEM — Z00.00 MEDICARE ANNUAL WELLNESS VISIT, SUBSEQUENT: Status: RESOLVED | Noted: 2022-10-14 | Resolved: 2024-02-21

## 2024-03-13 DIAGNOSIS — I10 BENIGN ESSENTIAL HYPERTENSION: ICD-10-CM

## 2024-03-13 RX ORDER — LISINOPRIL 30 MG/1
30 TABLET ORAL DAILY
Qty: 90 TABLET | Refills: 1 | Status: SHIPPED | OUTPATIENT
Start: 2024-03-13

## 2024-04-05 DIAGNOSIS — E79.0 ELEVATED URIC ACID IN BLOOD: ICD-10-CM

## 2024-04-05 RX ORDER — ALLOPURINOL 100 MG/1
100 TABLET ORAL DAILY
Qty: 90 TABLET | Refills: 1 | Status: SHIPPED | OUTPATIENT
Start: 2024-04-05

## 2024-04-28 DIAGNOSIS — I10 BENIGN ESSENTIAL HYPERTENSION: ICD-10-CM

## 2024-04-28 RX ORDER — DOXAZOSIN 8 MG/1
8 TABLET ORAL DAILY
Qty: 90 TABLET | Refills: 1 | Status: SHIPPED | OUTPATIENT
Start: 2024-04-28

## 2024-05-17 NOTE — LETTER
Had Dr. Duckworth sign the lab orders and got the patient [facility] at 3:15   January 6, 2020     Patient: [place]  YOB: 1955   Date of Visit: 1/6/2020       To Whom it May Concern:    Kaley Norwood is under my professional care  He was seen in my office on 1/6/2020  He may return to work on 1/8/2019  If you have any questions or concerns, please don't hesitate to call           Sincerely,          Susan Gupta MD        CC: No Recipients

## 2024-06-09 DIAGNOSIS — E78.5 HYPERLIPIDEMIA, UNSPECIFIED HYPERLIPIDEMIA TYPE: ICD-10-CM

## 2024-06-09 RX ORDER — ATORVASTATIN CALCIUM 20 MG/1
20 TABLET, FILM COATED ORAL DAILY
Qty: 90 TABLET | Refills: 1 | Status: SHIPPED | OUTPATIENT
Start: 2024-06-09

## 2024-06-12 ENCOUNTER — TELEPHONE (OUTPATIENT)
Age: 69
End: 2024-06-12

## 2024-06-12 NOTE — TELEPHONE ENCOUNTER
Patient called to request a refill for their ATORVASTIN 20 MG  advised a refill was requested on 6/9/2024  and WAS SENT TO PHARMACY ON 6/9/2024 Patient verbalized understanding and will contact pharmacy for refills

## 2024-06-27 ENCOUNTER — RA CDI HCC (OUTPATIENT)
Dept: OTHER | Facility: HOSPITAL | Age: 69
End: 2024-06-27

## 2024-06-27 NOTE — PROGRESS NOTES
HCC coding opportunities       Chart reviewed, no opportunity found: CHART REVIEWED, NO OPPORTUNITY FOUND   [facility] reviewed      Patients Insurance     Medicare Insurance: [facility] [employer] Advantage

## 2024-07-01 ENCOUNTER — APPOINTMENT (OUTPATIENT)
Dept: LAB | Facility: MEDICAL CENTER | Age: 69
End: 2024-07-01
Payer: COMMERCIAL

## 2024-07-01 DIAGNOSIS — Z87.39 HISTORY OF GOUT: ICD-10-CM

## 2024-07-01 DIAGNOSIS — Z12.5 SCREENING FOR PROSTATE CANCER: ICD-10-CM

## 2024-07-01 DIAGNOSIS — R73.03 PREDIABETES: ICD-10-CM

## 2024-07-01 DIAGNOSIS — Z13.0 SCREENING FOR DEFICIENCY ANEMIA: ICD-10-CM

## 2024-07-01 DIAGNOSIS — N18.31 STAGE 3A CHRONIC KIDNEY DISEASE (HCC): ICD-10-CM

## 2024-07-01 DIAGNOSIS — E78.00 HYPERCHOLESTEROLEMIA: ICD-10-CM

## 2024-07-01 LAB
ALBUMIN SERPL BCG-MCNC: 3.8 G/DL (ref 3.5–5)
ALP SERPL-CCNC: 115 U/L (ref 34–104)
ALT SERPL W P-5'-P-CCNC: 16 U/L (ref 7–52)
ANION GAP SERPL CALCULATED.3IONS-SCNC: 9 MMOL/L (ref 4–13)
AST SERPL W P-5'-P-CCNC: 17 U/L (ref 13–39)
BASOPHILS # BLD AUTO: 0.06 THOUSANDS/ÂΜL (ref 0–0.1)
BASOPHILS NFR BLD AUTO: 1 % (ref 0–1)
BILIRUB SERPL-MCNC: 0.84 MG/DL (ref 0.2–1)
BUN SERPL-MCNC: 17 MG/DL (ref 5–25)
CALCIUM SERPL-MCNC: 8.9 MG/DL (ref 8.4–10.2)
CHLORIDE SERPL-SCNC: 106 MMOL/L (ref 96–108)
CHOLEST SERPL-MCNC: 91 MG/DL
CO2 SERPL-SCNC: 24 MMOL/L (ref 21–32)
CREAT SERPL-MCNC: 1.2 MG/DL (ref 0.6–1.3)
EOSINOPHIL # BLD AUTO: 0.69 THOUSAND/ÂΜL (ref 0–0.61)
EOSINOPHIL NFR BLD AUTO: 8 % (ref 0–6)
ERYTHROCYTE [DISTWIDTH] IN BLOOD BY AUTOMATED COUNT: 12.4 % (ref 11.6–15.1)
EST. AVERAGE GLUCOSE BLD GHB EST-MCNC: 117 MG/DL
GFR SERPL CREATININE-BSD FRML MDRD: 61 ML/MIN/1.73SQ M
GLUCOSE P FAST SERPL-MCNC: 93 MG/DL (ref 65–99)
HBA1C MFR BLD: 5.7 %
HCT VFR BLD AUTO: 45 % (ref 36.5–49.3)
HDLC SERPL-MCNC: 35 MG/DL
HGB BLD-MCNC: 15 G/DL (ref 12–17)
IMM GRANULOCYTES # BLD AUTO: 0.02 THOUSAND/UL (ref 0–0.2)
IMM GRANULOCYTES NFR BLD AUTO: 0 % (ref 0–2)
LDLC SERPL CALC-MCNC: 33 MG/DL (ref 0–100)
LYMPHOCYTES # BLD AUTO: 1.88 THOUSANDS/ÂΜL (ref 0.6–4.47)
LYMPHOCYTES NFR BLD AUTO: 23 % (ref 14–44)
MCH RBC QN AUTO: 31.2 PG (ref 26.8–34.3)
MCHC RBC AUTO-ENTMCNC: 33.3 G/DL (ref 31.4–37.4)
MCV RBC AUTO: 94 FL (ref 82–98)
MONOCYTES # BLD AUTO: 0.88 THOUSAND/ÂΜL (ref 0.17–1.22)
MONOCYTES NFR BLD AUTO: 11 % (ref 4–12)
NEUTROPHILS # BLD AUTO: 4.72 THOUSANDS/ÂΜL (ref 1.85–7.62)
NEUTS SEG NFR BLD AUTO: 57 % (ref 43–75)
NRBC BLD AUTO-RTO: 0 /100 WBCS
PLATELET # BLD AUTO: 178 THOUSANDS/UL (ref 149–390)
PMV BLD AUTO: 11.2 FL (ref 8.9–12.7)
POTASSIUM SERPL-SCNC: 4.8 MMOL/L (ref 3.5–5.3)
PROT SERPL-MCNC: 6.5 G/DL (ref 6.4–8.4)
PSA SERPL-MCNC: 0.63 NG/ML (ref 0–4)
RBC # BLD AUTO: 4.81 MILLION/UL (ref 3.88–5.62)
SODIUM SERPL-SCNC: 139 MMOL/L (ref 135–147)
TRIGL SERPL-MCNC: 117 MG/DL
URATE SERPL-MCNC: 5.6 MG/DL (ref 3.5–8.5)
WBC # BLD AUTO: 8.25 THOUSAND/UL (ref 4.31–10.16)

## 2024-07-01 PROCEDURE — 83036 HEMOGLOBIN GLYCOSYLATED A1C: CPT

## 2024-07-01 PROCEDURE — 36415 COLL VENOUS BLD VENIPUNCTURE: CPT

## 2024-07-01 PROCEDURE — 80061 LIPID PANEL: CPT

## 2024-07-01 PROCEDURE — 80053 COMPREHEN METABOLIC PANEL: CPT

## 2024-07-01 PROCEDURE — G0103 PSA SCREENING: HCPCS

## 2024-07-01 PROCEDURE — 84550 ASSAY OF BLOOD/URIC ACID: CPT

## 2024-07-01 PROCEDURE — 85025 COMPLETE CBC W/AUTO DIFF WBC: CPT

## 2024-07-02 ENCOUNTER — RA CDI HCC (OUTPATIENT)
Dept: OTHER | Facility: HOSPITAL | Age: 69
End: 2024-07-02

## 2024-07-10 ENCOUNTER — OFFICE VISIT (OUTPATIENT)
Age: 69
End: 2024-07-10
Payer: COMMERCIAL

## 2024-07-10 VITALS
SYSTOLIC BLOOD PRESSURE: 114 MMHG | DIASTOLIC BLOOD PRESSURE: 66 MMHG | BODY MASS INDEX: 34.24 KG/M2 | OXYGEN SATURATION: 98 % | HEIGHT: 71 IN | HEART RATE: 75 BPM | WEIGHT: 244.6 LBS | TEMPERATURE: 97.8 F

## 2024-07-10 DIAGNOSIS — E78.00 HYPERCHOLESTEROLEMIA: Primary | ICD-10-CM

## 2024-07-10 DIAGNOSIS — R93.1 ELEVATED CORONARY ARTERY CALCIUM SCORE: ICD-10-CM

## 2024-07-10 DIAGNOSIS — K64.8 OTHER HEMORRHOIDS: ICD-10-CM

## 2024-07-10 DIAGNOSIS — E79.0 ELEVATED URIC ACID IN BLOOD: ICD-10-CM

## 2024-07-10 DIAGNOSIS — Z12.11 ENCOUNTER FOR COLORECTAL CANCER SCREENING: ICD-10-CM

## 2024-07-10 DIAGNOSIS — Z87.39 HISTORY OF GOUT: ICD-10-CM

## 2024-07-10 DIAGNOSIS — I10 BENIGN ESSENTIAL HYPERTENSION: ICD-10-CM

## 2024-07-10 DIAGNOSIS — R73.03 PREDIABETES: ICD-10-CM

## 2024-07-10 DIAGNOSIS — N18.31 STAGE 3A CHRONIC KIDNEY DISEASE (HCC): ICD-10-CM

## 2024-07-10 DIAGNOSIS — Z12.12 ENCOUNTER FOR COLORECTAL CANCER SCREENING: ICD-10-CM

## 2024-07-10 PROCEDURE — G2211 COMPLEX E/M VISIT ADD ON: HCPCS | Performed by: NURSE PRACTITIONER

## 2024-07-10 PROCEDURE — 99214 OFFICE O/P EST MOD 30 MIN: CPT | Performed by: NURSE PRACTITIONER

## 2024-07-10 RX ORDER — ALLOPURINOL 100 MG/1
100 TABLET ORAL DAILY
Qty: 180 TABLET | Refills: 1 | Status: SHIPPED | OUTPATIENT
Start: 2024-07-10

## 2024-07-10 RX ORDER — HYDROCORTISONE 25 MG/G
CREAM TOPICAL 2 TIMES DAILY PRN
Qty: 28 G | Refills: 1 | Status: SHIPPED | OUTPATIENT
Start: 2024-07-10

## 2024-07-10 NOTE — ASSESSMENT & PLAN NOTE
Lab Results   Component Value Date    EGFR 61 07/01/2024    EGFR 57 07/24/2023    EGFR 58 10/10/2022    CREATININE 1.20 07/01/2024    CREATININE 1.28 07/24/2023    CREATININE 1.27 10/10/2022     -stable and improved   -continue adequate hydration  -monitor every 6 months

## 2024-07-10 NOTE — ASSESSMENT & PLAN NOTE
- uric acid 5.6 but with 2 flares in the last 6 months  - patient and I agree to increase allopurinol back to 200mg daily which he was well controlled on  - repeat uric acid in 6 months

## 2024-07-10 NOTE — PROGRESS NOTES
Assessment/Plan:    Problem List Items Addressed This Visit       Benign essential hypertension     - controlled on doxazosin 8mg daily and lisinopril 30mg daily          Hypercholesterolemia - Primary     - LDL at goal  - advised to increase healthy fats in diet to improve HDL  - continue atorvastatin 20mg daily and fish oil 1000mg daily  - repeat in 6 months         Relevant Orders    Lipid Panel with Direct LDL reflex    History of gout     - uric acid 5.6 but with 2 flares in the last 6 months  - patient and I agree to increase allopurinol back to 200mg daily which he was well controlled on  - repeat uric acid in 6 months           Relevant Orders    Uric acid    Other hemorrhoids    Relevant Medications    hydrocortisone (ANUSOL-HC) 2.5 % rectal cream    Elevated uric acid in blood    Relevant Medications    allopurinol (ZYLOPRIM) 100 mg tablet    Other Relevant Orders    Uric acid    Stage 3a chronic kidney disease (HCC)     Lab Results   Component Value Date    EGFR 61 07/01/2024    EGFR 57 07/24/2023    EGFR 58 10/10/2022    CREATININE 1.20 07/01/2024    CREATININE 1.28 07/24/2023    CREATININE 1.27 10/10/2022     -stable and improved   -continue adequate hydration  -monitor every 6 months         Relevant Orders    Comprehensive metabolic panel    Elevated coronary artery calcium score     Total coronary calcium score 506 in 2022  Continue atorvastatin 20mg daily and fish oil 1000mg   LDL 33           Prediabetes     - stable, Hba1c 5.7   - continue to encourage healthy diet and routine exercise          Other Visit Diagnoses       Encounter for colorectal cancer screening        Relevant Orders    Ambulatory Referral to Colorectal Surgery          BMI Counseling: Body mass index is 33.79 kg/m².     M*Medio software was used to dictate this note.  It may contain errors with dictating incorrect words or incorrect spelling. Please contact the provider directly with any questions.    Subjective:      Patient  ID: Carter Grover is a 68 y.o. male.    HPI    Patient presents today for routine 6 month follow up.   Labs completed 7/1. He has lost 7 lbs since his last visit. He has been cutting back on portion sizes.   Hba1c 5.7   PSA normal 0.629    HTN-  Currently on doxazosin 8 mg daily and lisinopril 30 mg daily. He is complaint with medication. Does not monitor his BP at home.      Gout - no gout flares.  Currently on allopurinol 100 mg daily, cut back from 200mg daily at his last visit in Dec. Uric acid 5.6. he did have 2 flares since his last visit. Both flares were in his right great toe.     HLD- lipid panel from July 2024 at goal. Total cholesterol 91, triglycerides 117, HDL 35, LDL 33. compliant with atorvastatin 20 mg and aspirin 81 mg.  Takes fish oil 1000 mg daily      The following portions of the patient's history were reviewed and updated as appropriate: allergies, current medications, past family history, past medical history, past social history, past surgical history, and problem list.    Review of Systems   Constitutional:  Negative for activity change, appetite change and unexpected weight change.   Eyes:  Negative for visual disturbance.   Respiratory:  Negative for cough, chest tightness, shortness of breath and wheezing.    Cardiovascular:  Negative for chest pain, palpitations and leg swelling.   Neurological:  Negative for headaches.         Past Medical History:   Diagnosis Date    Bereavement 7/31/2020    Blurred vision     Last assessed - 1/28/15    CAP (community acquired pneumonia)     Last assessed - 9/20/16    Elevated bilirubin 11/6/2020    Hemorrhoids     Last assessed - 11/23/15    HTN (hypertension)     Hypercholesterolemia     IFG (impaired fasting glucose)     Last assessed - 11/18/14    Rotator cuff tendinitis     Last assessed - 6/17/15         Current Outpatient Medications:     allopurinol (ZYLOPRIM) 100 mg tablet, Take 1 tablet (100 mg total) by mouth daily, Disp: 180 tablet, Rfl: 1    " aspirin 81 MG tablet, Take 1 tablet by mouth daily, Disp: , Rfl:     atorvastatin (LIPITOR) 20 mg tablet, TAKE 1 TABLET BY MOUTH EVERY DAY, Disp: 90 tablet, Rfl: 1    doxazosin (CARDURA) 8 MG tablet, TAKE 1 TABLET BY MOUTH EVERY DAY, Disp: 90 tablet, Rfl: 1    hydrocortisone (ANUSOL-HC) 2.5 % rectal cream, Apply topically 2 (two) times a day as needed for hemorrhoids, Disp: 28 g, Rfl: 1    lisinopril (ZESTRIL) 30 mg tablet, Take 1 tablet (30 mg total) by mouth daily, Disp: 90 tablet, Rfl: 1    Omega-3 1000 MG CAPS, Take 1 capsule by mouth daily  , Disp: , Rfl:     Allergies   Allergen Reactions    Penicillins Hives    Pollen Extract        Social History   Past Surgical History:   Procedure Laterality Date    APPENDECTOMY      Resolved - 1/24/09    COLONOSCOPY      2021    WISDOM TOOTH EXTRACTION       Family History   Problem Relation Age of Onset    Hyperlipidemia Mother     Hypertension Mother     Pulmonary embolism Mother     Diabetes type II Mother     Hyperlipidemia Father     Hypertension Father     Prostate cancer Father     Diabetes type II Father        Objective:  /66 (BP Location: Left arm, Patient Position: Sitting, Cuff Size: Standard)   Pulse 75   Temp 97.8 °F (36.6 °C) (Temporal)   Ht 5' 11.34\" (1.812 m)   Wt 111 kg (244 lb 9.6 oz)   SpO2 98%   BMI 33.79 kg/m²      Physical Exam  Vitals reviewed.   Constitutional:       General: He is not in acute distress.     Appearance: Normal appearance. He is obese. He is not diaphoretic.   HENT:      Head: Normocephalic and atraumatic.      Right Ear: Tympanic membrane and external ear normal.      Left Ear: Tympanic membrane and external ear normal.   Eyes:      Extraocular Movements: Extraocular movements intact.      Conjunctiva/sclera: Conjunctivae normal.      Pupils: Pupils are equal, round, and reactive to light.   Neck:      Vascular: No carotid bruit.   Cardiovascular:      Rate and Rhythm: Normal rate and regular rhythm.      Heart " sounds: Normal heart sounds. No murmur heard.  Pulmonary:      Effort: Pulmonary effort is normal. No respiratory distress.      Breath sounds: Normal breath sounds. No wheezing, rhonchi or rales.   Musculoskeletal:      Right lower leg: No edema.      Left lower leg: No edema.   Skin:     General: Skin is warm and dry.   Neurological:      Mental Status: He is alert and oriented to person, place, and time. Mental status is at baseline.   Psychiatric:         Mood and Affect: Mood normal.         Behavior: Behavior normal.         Thought Content: Thought content normal.         Judgment: Judgment normal.

## 2024-07-10 NOTE — ASSESSMENT & PLAN NOTE
- LDL at goal  - advised to increase healthy fats in diet to improve HDL  - continue atorvastatin 20mg daily and fish oil 1000mg daily  - repeat in 6 months

## 2024-07-10 NOTE — ASSESSMENT & PLAN NOTE
Total coronary calcium score 506 in 2022  Continue atorvastatin 20mg daily and fish oil 1000mg   LDL 33

## 2024-07-22 ENCOUNTER — TELEPHONE (OUTPATIENT)
Age: 69
End: 2024-07-22

## 2024-07-22 ENCOUNTER — PREP FOR PROCEDURE (OUTPATIENT)
Age: 69
End: 2024-07-22

## 2024-07-22 DIAGNOSIS — K63.5 POLYP OF COLON, UNSPECIFIED PART OF COLON, UNSPECIFIED TYPE: Primary | ICD-10-CM

## 2024-07-22 NOTE — TELEPHONE ENCOUNTER
Scheduled date of colonoscopy (as of today):11/13/24    Physician performing colonoscopy:Dr Jo    Location of colonoscopy:[facility]    Bowel prep reviewed with patient:miralax/dulcolax    Instructions reviewed with patient by:pt requests prep instructions be mailed to his home address    Clearances: N/A

## 2024-07-22 NOTE — TELEPHONE ENCOUNTER
24  Screened by: Tamara Arce    Referring Provider Dr Jo    Pre- Screenin' 11 244 BMI 33.79    Has patient been referred for a routine screening Colonoscopy? yes  Is the patient between 45-75 years old? yes      Previous Colonoscopy yes   If yes:    Date:     Facility:     Reason:         Does the patient want to see a Gastroenterologist prior to their procedure OR are they having any GI symptoms? no    Has the patient been hospitalized or had abdominal surgery in the past 6 months? no    Does the patient use supplemental oxygen? no    Does the patient take Coumadin, Lovenox, Plavix, Elliquis, Xarelto, or other blood thinning medication? no    Has the patient had a stroke, cardiac event, or stent placed in the past year? no      If patient is between 45yrs - 49yrs, please advise patient that we will have to confirm benefits & coverage with their insurance company for a routine screening colonoscopy.

## 2024-08-21 ENCOUNTER — TELEPHONE (OUTPATIENT)
Age: 69
End: 2024-08-21

## 2024-08-21 DIAGNOSIS — E79.0 ELEVATED URIC ACID IN BLOOD: ICD-10-CM

## 2024-08-21 RX ORDER — ALLOPURINOL 100 MG/1
200 TABLET ORAL DAILY
Qty: 180 TABLET | Refills: 1 | Status: SHIPPED | OUTPATIENT
Start: 2024-08-21

## 2024-08-21 NOTE — TELEPHONE ENCOUNTER
Patient said he was switched back to 2 tabs daily for allopurinol (ZYLOPRIM) 100 mg tablet . Please send in the correct rx to [facility]/pharmacy #[id number] - SWETHA BURKETT - [address] [place] [address]

## 2024-08-22 NOTE — TELEPHONE ENCOUNTER
Rx was written correctly. Medication was sent yesterday to [facility].

## 2024-09-12 DIAGNOSIS — I10 BENIGN ESSENTIAL HYPERTENSION: ICD-10-CM

## 2024-09-12 RX ORDER — LISINOPRIL 30 MG/1
30 TABLET ORAL DAILY
Qty: 90 TABLET | Refills: 1 | Status: SHIPPED | OUTPATIENT
Start: 2024-09-12

## 2024-09-12 NOTE — TELEPHONE ENCOUNTER
Reason for call:   [x] Refill   [] Prior Auth  [] Other:     Office:   [x] PCP/Provider - SUDHEER Banda   [] Specialty/Provider -     Medication:  lisinopril (ZESTRIL) 30 mg tablet    Dose/Frequency: Take 1 tablet (30 mg total) by mouth daily     Quantity: 90    Pharmacy: [facility]/pharmacy #[id number] [address] [phone]    Does the patient have enough for 3 days?   [] Yes   [x] No - Send as HP to POD

## 2024-10-30 ENCOUNTER — ANESTHESIA EVENT (OUTPATIENT)
Dept: ANESTHESIOLOGY | Facility: HOSPITAL | Age: 69
End: 2024-10-30

## 2024-10-30 ENCOUNTER — ANESTHESIA (OUTPATIENT)
Dept: ANESTHESIOLOGY | Facility: HOSPITAL | Age: 69
End: 2024-10-30

## 2024-11-06 ENCOUNTER — OFFICE VISIT (OUTPATIENT)
Dept: INTERNAL MEDICINE CLINIC | Facility: OTHER | Age: 69
End: 2024-11-06
Payer: COMMERCIAL

## 2024-11-06 VITALS
DIASTOLIC BLOOD PRESSURE: 84 MMHG | WEIGHT: 252.6 LBS | OXYGEN SATURATION: 98 % | SYSTOLIC BLOOD PRESSURE: 140 MMHG | HEIGHT: 71 IN | HEART RATE: 82 BPM | BODY MASS INDEX: 35.36 KG/M2 | TEMPERATURE: 98.2 F

## 2024-11-06 DIAGNOSIS — Z23 ENCOUNTER FOR IMMUNIZATION: ICD-10-CM

## 2024-11-06 DIAGNOSIS — L08.9 SKIN INFECTION: ICD-10-CM
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
## 2024-11-06 PROCEDURE — 90715 TDAP VACCINE 7 YRS/> IM: CPT | Performed by: NURSE PRACTITIONER

## 2024-11-06 PROCEDURE — 90471 IMMUNIZATION ADMIN: CPT | Performed by: NURSE PRACTITIONER

## 2024-11-06 PROCEDURE — 99213 OFFICE O/P EST LOW 20 MIN: CPT | Performed by: NURSE PRACTITIONER

## 2024-11-06 RX ORDER — DOXYCYCLINE HYCLATE 100 MG
100 TABLET ORAL 2 TIMES DAILY
Qty: 14 TABLET | Refills: 0 | Status: SHIPPED | OUTPATIENT
Start: 2024-11-06 | End: 2024-11-13

## 2024-11-06 NOTE — ASSESSMENT & PLAN NOTE
-Tdap given in office  -Start doxycycline twice daily for 7 days. Advised to take with food and full glass of water  -wash site daily with mild soap and water, apply bacitracin and cover with bandage   -monitor for signs of infection: redness, swelling, worsening tenderness, drainage, etc. Notify office if any of these occur

## 2024-11-06 NOTE — PROGRESS NOTES
Assessment/Plan:    Problem List Items Addressed This Visit       Puncture wound of skin from metal nail - Primary     -Tdap given in office  -Start doxycycline twice daily for 7 days. Advised to take with food and full glass of water  -wash site daily with mild soap and water, apply bacitracin and cover with bandage   -monitor for signs of infection: redness, swelling, worsening tenderness, drainage, etc. Notify office if any of these occur          Relevant Medications    doxycycline hyclate (VIBRA-TABS) 100 mg tablet     Other Visit Diagnoses       Skin infection        Relevant Medications    doxycycline hyclate (VIBRA-TABS) 100 mg tablet    Encounter for immunization        Relevant Orders    TDAP VACCINE GREATER THAN OR EQUAL TO 8YO IM (Completed)                 M*Modal software was used to dictate this note.  It may contain errors with dictating incorrect words or incorrect spelling. Please contact the provider directly with any questions.    Subjective:      Patient ID: Carter Grover is a 69 y.o. male.    HPI    Patient presents today with hand injury. This morning he was pushing and old toolbox and a shiv nail punctured his hand. His neighbor helped him remove the nail from his hard. He washed the area with soap and water and wrapped it with an ace wrap. Bleeding stopped at home.   Last TD 2007    The following portions of the patient's history were reviewed and updated as appropriate: allergies, current medications, past family history, past medical history, past social history, past surgical history, and problem list.    Review of Systems   Constitutional:  Negative for chills and fever.   Musculoskeletal:  Negative for arthralgias.   Skin:  Positive for wound.         Past Medical History:   Diagnosis Date    Bereavement 7/31/2020    Blurred vision     Last assessed - 1/28/15    CAP (community acquired pneumonia)     Last assessed - 9/20/16    Elevated bilirubin 11/6/2020    Hemorrhoids     Last  "assessed - 11/23/15    HTN (hypertension)     Hypercholesterolemia     IFG (impaired fasting glucose)     Last assessed - 11/18/14    Rotator cuff tendinitis     Last assessed - 6/17/15         Current Outpatient Medications:     allopurinol (ZYLOPRIM) 100 mg tablet, Take 2 tablets (200 mg total) by mouth daily, Disp: 180 tablet, Rfl: 1    aspirin 81 MG tablet, Take 1 tablet by mouth daily, Disp: , Rfl:     atorvastatin (LIPITOR) 20 mg tablet, TAKE 1 TABLET BY MOUTH EVERY DAY, Disp: 90 tablet, Rfl: 1    doxazosin (CARDURA) 8 MG tablet, TAKE 1 TABLET BY MOUTH EVERY DAY, Disp: 90 tablet, Rfl: 1    doxycycline hyclate (VIBRA-TABS) 100 mg tablet, Take 1 tablet (100 mg total) by mouth 2 (two) times a day for 7 days, Disp: 14 tablet, Rfl: 0    hydrocortisone (ANUSOL-HC) 2.5 % rectal cream, Apply topically 2 (two) times a day as needed for hemorrhoids, Disp: 28 g, Rfl: 1    lisinopril (ZESTRIL) 30 mg tablet, Take 1 tablet (30 mg total) by mouth daily, Disp: 90 tablet, Rfl: 1    Omega-3 1000 MG CAPS, Take 1 capsule by mouth daily  , Disp: , Rfl:     Allergies   Allergen Reactions    Penicillins Hives    Pollen Extract        Social History   Past Surgical History:   Procedure Laterality Date    APPENDECTOMY      Resolved - 1/24/09    COLONOSCOPY      2021    WISDOM TOOTH EXTRACTION       Family History   Problem Relation Age of Onset    Hyperlipidemia Mother     Hypertension Mother     Pulmonary embolism Mother     Diabetes type II Mother     Hyperlipidemia Father     Hypertension Father     Prostate cancer Father     Diabetes type II Father        Objective:  /84 (BP Location: Left arm, Patient Position: Sitting, Cuff Size: Standard)   Pulse 82   Temp 98.2 °F (36.8 °C) (Temporal)   Ht 5' 11.34\" (1.812 m)   Wt 115 kg (252 lb 9.6 oz)   SpO2 98%   BMI 34.90 kg/m²      Physical Exam  Vitals reviewed.   Constitutional:       General: He is not in acute distress.     Appearance: He is not diaphoretic.   HENT:      " Head: Normocephalic and atraumatic.   Eyes:      Extraocular Movements: Extraocular movements intact.      Conjunctiva/sclera: Conjunctivae normal.      Pupils: Pupils are equal, round, and reactive to light.   Pulmonary:      Effort: Pulmonary effort is normal. No respiratory distress.   Musculoskeletal:      Right hand: No bony tenderness. Normal range of motion. Normal strength.   Skin:     Findings: Wound (puncture wound right hand- see photo) present.   Neurological:      Mental Status: He is alert and oriented to person, place, and time. Mental status is at baseline.   Psychiatric:         Mood and Affect: Mood normal.         Behavior: Behavior normal.

## 2024-11-06 NOTE — PATIENT INSTRUCTIONS
Keep site clean with soap and water  Keep covered with bandage   Monitor for any signs of infection, redness, swelling, drainage.   May apply ice tonight  Take doxycycline twice daily for 7 days, follow up for any worsening symptoms

## 2024-11-13 ENCOUNTER — HOSPITAL ENCOUNTER (OUTPATIENT)
Dept: GASTROENTEROLOGY | Facility: AMBULARY SURGERY CENTER | Age: 69
Setting detail: OUTPATIENT SURGERY
Discharge: HOME | End: 2024-11-13
Attending: COLON & RECTAL SURGERY
Payer: COMMERCIAL

## 2024-11-13 ENCOUNTER — ANESTHESIA EVENT (OUTPATIENT)
Dept: GASTROENTEROLOGY | Facility: AMBULARY SURGERY CENTER | Age: 69
End: 2024-11-13
Payer: COMMERCIAL

## 2024-11-13 VITALS
BODY MASS INDEX: 34.58 KG/M2 | OXYGEN SATURATION: 97 % | SYSTOLIC BLOOD PRESSURE: 113 MMHG | DIASTOLIC BLOOD PRESSURE: 66 MMHG | HEART RATE: 54 BPM | WEIGHT: 247 LBS | RESPIRATION RATE: 16 BRPM | HEIGHT: 71 IN | TEMPERATURE: 97 F

## 2024-11-13 DIAGNOSIS — K63.5 POLYP OF COLON, UNSPECIFIED PART OF COLON, UNSPECIFIED TYPE: ICD-10-CM

## 2024-11-13 PROCEDURE — G0105 COLORECTAL SCRN; HI RISK IND: HCPCS | Performed by: COLON & RECTAL SURGERY

## 2024-11-13 RX ORDER — PROPOFOL 10 MG/ML
INJECTION, EMULSION INTRAVENOUS AS NEEDED
Status: DISCONTINUED | OUTPATIENT
Start: 2024-11-13 | End: 2024-11-13

## 2024-11-13 RX ORDER — LIDOCAINE HYDROCHLORIDE 10 MG/ML
INJECTION, SOLUTION EPIDURAL; INFILTRATION; INTRACAUDAL; PERINEURAL AS NEEDED
Status: DISCONTINUED | OUTPATIENT
Start: 2024-11-13 | End: 2024-11-13

## 2024-11-13 RX ORDER — SODIUM CHLORIDE, SODIUM LACTATE, POTASSIUM CHLORIDE, CALCIUM CHLORIDE 600; 310; 30; 20 MG/100ML; MG/100ML; MG/100ML; MG/100ML
INJECTION, SOLUTION INTRAVENOUS CONTINUOUS PRN
Status: DISCONTINUED | OUTPATIENT
Start: 2024-11-13 | End: 2024-11-13

## 2024-11-13 RX ADMIN — PROPOFOL 30 MG: 10 INJECTION, EMULSION INTRAVENOUS at 09:08

## 2024-11-13 RX ADMIN — PROPOFOL 20 MG: 10 INJECTION, EMULSION INTRAVENOUS at 09:16

## 2024-11-13 RX ADMIN — PROPOFOL 30 MG: 10 INJECTION, EMULSION INTRAVENOUS at 09:17

## 2024-11-13 RX ADMIN — LIDOCAINE HYDROCHLORIDE 50 MG: 10 INJECTION, SOLUTION EPIDURAL; INFILTRATION; INTRACAUDAL at 09:06

## 2024-11-13 RX ADMIN — PROPOFOL 120 MG: 10 INJECTION, EMULSION INTRAVENOUS at 09:06

## 2024-11-13 RX ADMIN — SODIUM CHLORIDE, SODIUM LACTATE, POTASSIUM CHLORIDE, AND CALCIUM CHLORIDE: .6; .31; .03; .02 INJECTION, SOLUTION INTRAVENOUS at 09:04

## 2024-11-13 RX ADMIN — PROPOFOL 30 MG: 10 INJECTION, EMULSION INTRAVENOUS at 09:11

## 2024-11-13 NOTE — ANESTHESIA POSTPROCEDURE EVALUATION
Post-Op Assessment Note    CV Status:  Stable    Pain management: adequate       Mental Status:  Alert and awake   Hydration Status:  Euvolemic   PONV Controlled:  Controlled   Airway Patency:  Patent     Post Op Vitals Reviewed: Yes    No anethesia notable event occurred.    Staff: CRNA           Last Filed PACU Vitals:  Vitals Value Taken Time   Temp     Pulse 53    /63    Resp 15    SpO2 98%        Modified Osei:  Activity: 2 (11/13/2024  8:59 AM)  Respiration: 2 (11/13/2024  8:59 AM)  Circulation: 2 (11/13/2024  8:59 AM)  Consciousness: 2 (11/13/2024  8:59 AM)  Oxygen Saturation: 2 (11/13/2024  8:59 AM)  Modified Osei Score: 10 (11/13/2024  8:59 AM)

## 2024-11-13 NOTE — H&P
History and Physical   Colon and Rectal Surgery   Carter Grover 69 y.o. male MRN: [id number]  Unit/Bed#:  Encounter: [id number]  11/13/24   8:55 AM      CC:  History of polyps    History of Present Illness   HPI:  Carter Grover is a 69 y.o. male with no GI symptoms.  Historical Information   Past Medical History:   Diagnosis Date    Bereavement 7/31/2020    Blurred vision     Last assessed - 1/28/15    CAP (community acquired pneumonia)     Last assessed - 9/20/16    Elevated bilirubin 11/6/2020    Hemorrhoids     Last assessed - 11/23/15    HTN (hypertension)     Hypercholesterolemia     IFG (impaired fasting glucose)     Last assessed - 11/18/14    Rotator cuff tendinitis     Last assessed - 6/17/15     Past Surgical History:   Procedure Laterality Date    APPENDECTOMY      Resolved - 1/24/09    COLONOSCOPY      2021    WISDOM TOOTH EXTRACTION         Meds/Allergies     Not in a hospital admission.      Current Outpatient Medications:     allopurinol (ZYLOPRIM) 100 mg tablet, Take 2 tablets (200 mg total) by mouth daily, Disp: 180 tablet, Rfl: 1    aspirin 81 MG tablet, Take 1 tablet by mouth daily, Disp: , Rfl:     atorvastatin (LIPITOR) 20 mg tablet, TAKE 1 TABLET BY MOUTH EVERY DAY, Disp: 90 tablet, Rfl: 1    doxazosin (CARDURA) 8 MG tablet, TAKE 1 TABLET BY MOUTH EVERY DAY, Disp: 90 tablet, Rfl: 1    doxycycline hyclate (VIBRA-TABS) 100 mg tablet, Take 1 tablet (100 mg total) by mouth 2 (two) times a day for 7 days, Disp: 14 tablet, Rfl: 0    hydrocortisone (ANUSOL-HC) 2.5 % rectal cream, Apply topically 2 (two) times a day as needed for hemorrhoids, Disp: 28 g, Rfl: 1    lisinopril (ZESTRIL) 30 mg tablet, Take 1 tablet (30 mg total) by mouth daily, Disp: 90 tablet, Rfl: 1    Omega-3 1000 MG CAPS, Take 1 capsule by mouth daily  , Disp: , Rfl:     Allergies   Allergen Reactions    Penicillins Hives    Pollen Extract          Social History   Social History     Substance and Sexual Activity   Alcohol Use Yes     Alcohol/week: 1.0 standard drink of alcohol    Types: 1 Cans of beer per week    Comment: 4 weekly     Social History     Substance and Sexual Activity   Drug Use No     Social History     Tobacco Use   Smoking Status Never   Smokeless Tobacco Never         Family History:   Family History   Problem Relation Age of Onset    Hyperlipidemia Mother     Hypertension Mother     Pulmonary embolism Mother     Diabetes type II Mother     Hyperlipidemia Father     Hypertension Father     Prostate cancer Father     Diabetes type II Father          Objective     Current Vitals:      No intake or output data in the 24 hours ending 11/13/24 0855    Physical Exam:  General:  Well nourished, no distress.  Neuro: Alert and oriented  Eyes:Sclera anicteric, conjunctiva pink.  Pulm: Clear to auscultation bilaterally. No respiratory Distress.   CV:  Regular rate and rhythm. No murmurs.  Abdomen:  Soft, flat, non-tender, without masses or hepatosplenomegaly.    Lab Results:       ASSESSMENT:  Carter Grover is a 69 y.o. male for surveillance.  PLAN:  Colonoscopy.  Risks , including, but not limited to, bleeding, perforation, missed lesions, and potential need for surgery, were reviewed. Alternatives to colonoscopy were discussed.  ANALIA Jo MD

## 2024-11-13 NOTE — ANESTHESIA PREPROCEDURE EVALUATION
Procedure:  COLONOSCOPY    Relevant Problems   CARDIO   (+) Benign essential hypertension   (+) Elevated coronary artery calcium score   (+) Hypercholesterolemia   (+) Other hemorrhoids      /RENAL   (+) Stage 3a chronic kidney disease (HCC)      MUSCULOSKELETAL   (+) Tricompartment osteoarthritis of left knee        Physical Exam    Airway    Mallampati score: II  TM Distance: >3 FB  Neck ROM: full     Dental       Cardiovascular      Pulmonary      Other Findings        Anesthesia Plan  ASA Score- 2     Anesthesia Type- IV sedation with anesthesia with ASA Monitors.         Additional Monitors:     Airway Plan:            Plan Factors-    Chart reviewed.                      Induction- intravenous.    Postoperative Plan-         Informed Consent- Anesthetic plan and risks discussed with patient.  I personally reviewed this patient with the CRNA. Discussed and agreed on the Anesthesia Plan with the CRNA..

## 2024-12-03 DIAGNOSIS — I10 BENIGN ESSENTIAL HYPERTENSION: ICD-10-CM

## 2024-12-03 NOTE — TELEPHONE ENCOUNTER
Reason for call:   [x] Refill   [] Prior Auth  [] Other:     Office:   [x] PCP/Provider - Allen Napier  [] Specialty/Provider -     Medication: Doxazosin    Dose/Frequency: 8 mg Daily     Quantity: 90    Pharmacy: [place] [place] [address]     Does the patient have enough for 3 days?   [x] Yes   [] No - Send as HP to POD

## 2024-12-04 RX ORDER — DOXAZOSIN 8 MG/1
8 TABLET ORAL DAILY
Qty: 90 TABLET | Refills: 1 | Status: SHIPPED | OUTPATIENT
Start: 2024-12-04

## 2024-12-07 DIAGNOSIS — E78.5 HYPERLIPIDEMIA, UNSPECIFIED HYPERLIPIDEMIA TYPE: ICD-10-CM

## 2024-12-08 RX ORDER — ATORVASTATIN CALCIUM 20 MG/1
20 TABLET, FILM COATED ORAL DAILY
Qty: 90 TABLET | Refills: 1 | Status: SHIPPED | OUTPATIENT
Start: 2024-12-08

## 2024-12-20 ENCOUNTER — APPOINTMENT (OUTPATIENT)
Dept: LAB | Facility: MEDICAL CENTER | Age: 69
End: 2024-12-20
Payer: COMMERCIAL

## 2024-12-20 DIAGNOSIS — N18.31 STAGE 3A CHRONIC KIDNEY DISEASE (HCC): ICD-10-CM

## 2024-12-20 DIAGNOSIS — Z87.39 HISTORY OF GOUT: ICD-10-CM

## 2024-12-20 DIAGNOSIS — E78.00 HYPERCHOLESTEROLEMIA: ICD-10-CM

## 2024-12-20 DIAGNOSIS — E79.0 ELEVATED URIC ACID IN BLOOD: ICD-10-CM

## 2024-12-20 LAB
ALBUMIN SERPL BCG-MCNC: 4 G/DL (ref 3.5–5)
ALP SERPL-CCNC: 111 U/L (ref 34–104)
ALT SERPL W P-5'-P-CCNC: 17 U/L (ref 7–52)
ANION GAP SERPL CALCULATED.3IONS-SCNC: 7 MMOL/L (ref 4–13)
AST SERPL W P-5'-P-CCNC: 16 U/L (ref 13–39)
BILIRUB SERPL-MCNC: 1.56 MG/DL (ref 0.2–1)
BUN SERPL-MCNC: 14 MG/DL (ref 5–25)
CALCIUM SERPL-MCNC: 9.2 MG/DL (ref 8.4–10.2)
CHLORIDE SERPL-SCNC: 107 MMOL/L (ref 96–108)
CHOLEST SERPL-MCNC: 91 MG/DL (ref ?–200)
CO2 SERPL-SCNC: 26 MMOL/L (ref 21–32)
CREAT SERPL-MCNC: 1.14 MG/DL (ref 0.6–1.3)
GFR SERPL CREATININE-BSD FRML MDRD: 65 ML/MIN/1.73SQ M
GLUCOSE P FAST SERPL-MCNC: 98 MG/DL (ref 65–99)
HDLC SERPL-MCNC: 38 MG/DL
LDLC SERPL CALC-MCNC: 36 MG/DL (ref 0–100)
POTASSIUM SERPL-SCNC: 4 MMOL/L (ref 3.5–5.3)
PROT SERPL-MCNC: 6.8 G/DL (ref 6.4–8.4)
SODIUM SERPL-SCNC: 140 MMOL/L (ref 135–147)
TRIGL SERPL-MCNC: 83 MG/DL (ref ?–150)
URATE SERPL-MCNC: 5.4 MG/DL (ref 3.5–8.5)

## 2024-12-20 PROCEDURE — 36415 COLL VENOUS BLD VENIPUNCTURE: CPT

## 2024-12-20 PROCEDURE — 80061 LIPID PANEL: CPT

## 2024-12-20 PROCEDURE — 80053 COMPREHEN METABOLIC PANEL: CPT

## 2024-12-20 PROCEDURE — 84550 ASSAY OF BLOOD/URIC ACID: CPT

## 2024-12-29 ENCOUNTER — RA CDI HCC (OUTPATIENT)
Dept: OTHER | Facility: HOSPITAL | Age: 69
End: 2024-12-29

## 2024-12-29 NOTE — PROGRESS NOTES
HCC coding opportunities          Chart Reviewed number of suggestions sent to Provider: 1  Recommend adding I12.9 - combination code - hypertensive renal disease      Patients Insurance     Medicare Insurance: [employer]

## 2025-01-03 ENCOUNTER — OFFICE VISIT (OUTPATIENT)
Dept: INTERNAL MEDICINE CLINIC | Facility: OTHER | Age: 70
End: 2025-01-03
Payer: COMMERCIAL

## 2025-01-03 VITALS
OXYGEN SATURATION: 99 % | BODY MASS INDEX: 34.75 KG/M2 | HEIGHT: 71 IN | SYSTOLIC BLOOD PRESSURE: 130 MMHG | DIASTOLIC BLOOD PRESSURE: 72 MMHG | WEIGHT: 248.2 LBS | HEART RATE: 72 BPM | TEMPERATURE: 97.7 F

## 2025-01-03 DIAGNOSIS — N18.31 STAGE 3A CHRONIC KIDNEY DISEASE (HCC): ICD-10-CM

## 2025-01-03 DIAGNOSIS — E78.00 HYPERCHOLESTEROLEMIA: ICD-10-CM

## 2025-01-03 DIAGNOSIS — Z23 NEEDS FLU SHOT: ICD-10-CM

## 2025-01-03 DIAGNOSIS — Z12.5 SCREENING FOR PROSTATE CANCER: ICD-10-CM

## 2025-01-03 DIAGNOSIS — Z87.39 HISTORY OF GOUT: ICD-10-CM

## 2025-01-03 DIAGNOSIS — Z23 ENCOUNTER FOR IMMUNIZATION: ICD-10-CM

## 2025-01-03 DIAGNOSIS — I10 BENIGN ESSENTIAL HYPERTENSION: Primary | ICD-10-CM

## 2025-01-03 DIAGNOSIS — Z00.00 MEDICARE ANNUAL WELLNESS VISIT, SUBSEQUENT: ICD-10-CM

## 2025-01-03 DIAGNOSIS — Z13.0 SCREENING FOR DEFICIENCY ANEMIA: ICD-10-CM
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
## 2025-01-03 PROCEDURE — G0008 ADMIN INFLUENZA VIRUS VAC: HCPCS

## 2025-01-03 PROCEDURE — 99214 OFFICE O/P EST MOD 30 MIN: CPT | Performed by: NURSE PRACTITIONER

## 2025-01-03 PROCEDURE — G0009 ADMIN PNEUMOCOCCAL VACCINE: HCPCS

## 2025-01-03 PROCEDURE — 90677 PCV20 VACCINE IM: CPT

## 2025-01-03 PROCEDURE — 90662 IIV NO PRSV INCREASED AG IM: CPT

## 2025-01-03 PROCEDURE — G0439 PPPS, SUBSEQ VISIT: HCPCS | Performed by: NURSE PRACTITIONER

## 2025-01-03 NOTE — ASSESSMENT & PLAN NOTE
Lab Results   Component Value Date    EGFR 65 12/20/2024    EGFR 61 07/01/2024    EGFR 57 07/24/2023    CREATININE 1.14 12/20/2024    CREATININE 1.20 07/01/2024    CREATININE 1.28 07/24/2023   Continue to trend renal function every 6 months, recently improving  Continue to avoid nephrotoxins  Stay adequately hydrated

## 2025-01-03 NOTE — PATIENT INSTRUCTIONS
Medicare Preventive Visit Patient Instructions  Thank you for completing your Welcome to Medicare Visit or Medicare Annual Wellness Visit today. Your next wellness visit will be due in one year (1/4/2026).  The screening/preventive services that you may require over the next 5-10 years are detailed below. Some tests may not apply to you based off risk factors and/or age. Screening tests ordered at today's visit but not completed yet may show as past due. Also, please note that scanned in results may not display below.  Preventive Screenings:  Service Recommendations Previous Testing/Comments   Colorectal Cancer Screening  Colonoscopy    Fecal Occult Blood Test (FOBT)/Fecal Immunochemical Test (FIT)  Fecal DNA/Cologuard Test  Flexible Sigmoidoscopy Age: 45-75 years old   Colonoscopy: every 10 years (May be performed more frequently if at higher risk)  OR  FOBT/FIT: every 1 year  OR  Cologuard: every 3 years  OR  Sigmoidoscopy: every 5 years  Screening may be recommended earlier than age 45 if at higher risk for colorectal cancer. Also, an individualized decision between you and your healthcare provider will decide whether screening between the ages of 76-85 would be appropriate. Colonoscopy: 11/13/2024  FOBT/FIT: Not on file  Cologuard: Not on file  Sigmoidoscopy: Not on file    Screening Current     Prostate Cancer Screening Individualized decision between patient and health care provider in men between ages of 55-69   Medicare will cover every 12 months beginning on the day after your 50th birthday PSA: 0.629 ng/mL     Screening Current     Hepatitis C Screening Once for adults born between 1945 and 1965  More frequently in patients at high risk for Hepatitis C Hep C Antibody: 09/26/2018    Screening Current   Diabetes Screening 1-2 times per year if you're at risk for diabetes or have pre-diabetes Fasting glucose: 98 mg/dL (12/20/2024)  A1C: 5.7 % (7/1/2024)  Screening Current   Cholesterol Screening Once every 5  years if you don't have a lipid disorder. May order more often based on risk factors. Lipid panel: 12/20/2024  Screening Not Indicated  History Lipid Disorder      Other Preventive Screenings Covered by Medicare:  Abdominal Aortic Aneurysm (AAA) Screening: covered once if your at risk. You're considered to be at risk if you have a family history of AAA or a male between the age of 65-75 who smoking at least 100 cigarettes in your lifetime.  Lung Cancer Screening: covers low dose CT scan once per year if you meet all of the following conditions: (1) Age 55-77; (2) No signs or symptoms of lung cancer; (3) Current smoker or have quit smoking within the last 15 years; (4) You have a tobacco smoking history of at least 20 pack years (packs per day x number of years you smoked); (5) You get a written order from a healthcare provider.  Glaucoma Screening: covered annually if you're considered high risk: (1) You have diabetes OR (2) Family history of glaucoma OR (3)  aged 50 and older OR (4)  American aged 65 and older  Osteoporosis Screening: covered every 2 years if you meet one of the following conditions: (1) Have a vertebral abnormality; (2) On glucocorticoid therapy for more than 3 months; (3) Have primary hyperparathyroidism; (4) On osteoporosis medications and need to assess response to drug therapy.  HIV Screening: covered annually if you're between the age of 15-65. Also covered annually if you are younger than 15 and older than 65 with risk factors for HIV infection. For pregnant patients, it is covered up to 3 times per pregnancy.    Immunizations:  Immunization Recommendations   Influenza Vaccine Annual influenza vaccination during flu season is recommended for all persons aged >= 6 months who do not have contraindications   Pneumococcal Vaccine   * Pneumococcal conjugate vaccine = PCV13 (Prevnar 13), PCV15 (Vaxneuvance), PCV20 (Prevnar 20)  * Pneumococcal polysaccharide vaccine = PPSV23  (Pneumovax) Adults 19-63 yo with certain risk factors or if 65+ yo  If never received any pneumonia vaccine: recommend Prevnar 20 (PCV20)  Give PCV20 if previously received 1 dose of PCV13 or PPSV23   Hepatitis B Vaccine 3 dose series if at intermediate or high risk (ex: diabetes, end stage renal disease, liver disease)   Respiratory syncytial virus (RSV) Vaccine - COVERED BY MEDICARE PART D  * RSVPreF3 (Arexvy) CDC recommends that adults 60 years of age and older may receive a single dose of RSV vaccine using shared clinical decision-making (SCDM)   Tetanus (Td) Vaccine - COST NOT COVERED BY MEDICARE PART B Following completion of primary series, a booster dose should be given every 10 years to maintain immunity against tetanus. Td may also be given as tetanus wound prophylaxis.   Tdap Vaccine - COST NOT COVERED BY MEDICARE PART B Recommended at least once for all adults. For pregnant patients, recommended with each pregnancy.   Shingles Vaccine (Shingrix) - COST NOT COVERED BY MEDICARE PART B  2 shot series recommended in those 19 years and older who have or will have weakened immune systems or those 50 years and older     Health Maintenance Due:      Topic Date Due   • Colorectal Cancer Screening  11/12/2029   • Hepatitis C Screening  Completed     Immunizations Due:      Topic Date Due   • COVID-19 Vaccine (3 - 2024-25 season) 09/01/2024     Advance Directives   What are advance directives?  Advance directives are legal documents that state your wishes and plans for medical care. These plans are made ahead of time in case you lose your ability to make decisions for yourself. Advance directives can apply to any medical decision, such as the treatments you want, and if you want to donate organs.   What are the types of advance directives?  There are many types of advance directives, and each state has rules about how to use them. You may choose a combination of any of the following:  Living will:  This is a  written record of the treatment you want. You can also choose which treatments you do not want, which to limit, and which to stop at a certain time. This includes surgery, medicine, IV fluid, and tube feedings.   Durable power of  for healthcare (DPAHC):  This is a written record that states who you want to make healthcare choices for you when you are unable to make them for yourself. This person, called a proxy, is usually a family member or a friend. You may choose more than 1 proxy.  Do not resuscitate (DNR) order:  A DNR order is used in case your heart stops beating or you stop breathing. It is a request not to have certain forms of treatment, such as CPR. A DNR order may be included in other types of advance directives.  Medical directive:  This covers the care that you want if you are in a coma, near death, or unable to make decisions for yourself. You can list the treatments you want for each condition. Treatment may include pain medicine, surgery, blood transfusions, dialysis, IV or tube feedings, and a ventilator (breathing machine).  Values history:  This document has questions about your views, beliefs, and how you feel and think about life. This information can help others choose the care that you would choose.  Why are advance directives important?  An advance directive helps you control your care. Although spoken wishes may be used, it is better to have your wishes written down. Spoken wishes can be misunderstood, or not followed. Treatments may be given even if you do not want them. An advance directive may make it easier for your family to make difficult choices about your care.   Weight Management   Why it is important to manage your weight:  Being overweight increases your risk of health conditions such as heart disease, high blood pressure, type 2 diabetes, and certain types of cancer. It can also increase your risk for osteoarthritis, sleep apnea, and other respiratory problems. Aim for  a slow, steady weight loss. Even a small amount of weight loss can lower your risk of health problems.  How to lose weight safely:  A safe and healthy way to lose weight is to eat fewer calories and get regular exercise. You can lose up about 1 pound a week by decreasing the number of calories you eat by 500 calories each day.   Healthy meal plan for weight management:  A healthy meal plan includes a variety of foods, contains fewer calories, and helps you stay healthy. A healthy meal plan includes the following:  Eat whole-grain foods more often.  A healthy meal plan should contain fiber. Fiber is the part of grains, fruits, and vegetables that is not broken down by your body. Whole-grain foods are healthy and provide extra fiber in your diet. Some examples of whole-grain foods are whole-wheat breads and pastas, oatmeal, brown rice, and bulgur.  Eat a variety of vegetables every day.  Include dark, leafy greens such as spinach, kale, patrica greens, and mustard greens. Eat yellow and orange vegetables such as carrots, sweet potatoes, and winter squash.   Eat a variety of fruits every day.  Choose fresh or canned fruit (canned in its own juice or light syrup) instead of juice. Fruit juice has very little or no fiber.  Eat low-fat dairy foods.  Drink fat-free (skim) milk or 1% milk. Eat fat-free yogurt and low-fat cottage cheese. Try low-fat cheeses such as mozzarella and other reduced-fat cheeses.  Choose meat and other protein foods that are low in fat.  Choose beans or other legumes such as split peas or lentils. Choose fish, skinless poultry (chicken or turkey), or lean cuts of red meat (beef or pork). Before you cook meat or poultry, cut off any visible fat.   Use less fat and oil.  Try baking foods instead of frying them. Add less fat, such as margarine, sour cream, regular salad dressing and mayonnaise to foods. Eat fewer high-fat foods. Some examples of high-fat foods include french fries, doughnuts, ice  cream, and cakes.  Eat fewer sweets.  Limit foods and drinks that are high in sugar. This includes candy, cookies, regular soda, and sweetened drinks.  Exercise:  Exercise at least 30 minutes per day on most days of the week. Some examples of exercise include walking, biking, dancing, and swimming. You can also fit in more physical activity by taking the stairs instead of the elevator or parking farther away from stores. Ask your healthcare provider about the best exercise plan for you.      © Copyright [employer] 2018 Information is for End User's use only and may not be sold, redistributed or otherwise used for commercial purposes. All illustrations and images included in CareNotes® are the copyrighted property of [employer] or [employer]

## 2025-01-03 NOTE — ASSESSMENT & PLAN NOTE
Lipid panel at goal  Continue current regimen: Atorvastatin 20 mg daily, ASA 81 mg daily and fish oil  Orders:    Lipid Panel with Direct LDL reflex; Future    Comprehensive metabolic panel; Future

## 2025-01-03 NOTE — PROGRESS NOTES
Name: Carter Grover      : 1955      MRN: [id number]  Encounter Provider: SUDHEER Banda  Encounter Date: 1/3/2025   Encounter department: [facility]    Assessment & Plan  Benign essential hypertension  Controlled on current regimen  Continue doxazosin 8 mg daily and lisinopril 30 mg daily       Stage 3a chronic kidney disease (HCC)  Lab Results   Component Value Date    EGFR 65 2024    EGFR 61 2024    EGFR 57 2023    CREATININE 1.14 2024    CREATININE 1.20 2024    CREATININE 1.28 2023   Continue to trend renal function every 6 months, recently improving  Continue to avoid nephrotoxins  Stay adequately hydrated         Screening for deficiency anemia    Orders:    CBC and differential; Future    Medicare annual wellness visit, subsequent  Patient brought in ACP documents to be scanned into chart  Influenza vaccine given in office today  Prevnar 20 vaccine given in office today  Continue healthy diet and routine exercise       Hypercholesterolemia  Lipid panel at goal  Continue current regimen: Atorvastatin 20 mg daily, ASA 81 mg daily and fish oil  Orders:    Lipid Panel with Direct LDL reflex; Future    Comprehensive metabolic panel; Future    History of gout  Uric acid normal  Continue allopurinol 100 mg daily  Orders:    Uric acid; Future    Needs flu shot    Orders:    influenza vaccine, high-dose, PF 0.5 mL (Fluzone High Dose)    Encounter for immunization    Orders:    Pneumococcal Conjugate Vaccine 20-valent (Pcv20)    Screening for prostate cancer    Orders:    PSA, Total Screen; Future       Preventive health issues were discussed with patient, and age appropriate screening tests were ordered as noted in patient's After Visit Summary. Personalized health advice and appropriate referrals for health education or preventive services given if needed, as noted in patient's After Visit Summary.    History of Present  Illness     HPI     Patient presents today for 6 month follow up and AWV  Labs completed 12/20     HTN-  Currently on doxazosin 8 mg daily and lisinopril 30 mg daily. He is complaint with medication. Does not monitor his BP at home.      Gout - no gout flares.  Currently on allopurinol 100 mg daily. Uric acid 5.4     HLD- Total cholesterol 91, triglycerides 83, HDL 38, LDL 36. compliant with atorvastatin 20 mg and aspirin 81 mg.  Takes fish oil 1000 mg daily       Patient Care Team:  SUDHEER Banda as PCP - General (Internal Medicine)  Tahmina Camejo MD as PCP - [facility]CP-[facility]  Ahmet Rodrigues DO [facility] PCP [facility]    Review of Systems   Constitutional:  Negative for activity change, appetite change and unexpected weight change.   Eyes:  Negative for visual disturbance.   Respiratory:  Negative for cough, chest tightness and shortness of breath.    Cardiovascular:  Negative for chest pain and palpitations.   Neurological:  Negative for headaches.     Medical History Reviewed by provider this encounter:  Tobacco  Allergies  Meds  Problems  Med Hx  Surg Hx  Fam Hx       Annual Wellness Visit Questionnaire   Carter is here for his Subsequent Wellness visit.     Health Risk Assessment:   Patient rates overall health as very good. Patient feels that their physical health rating is same. Patient is satisfied with their life. Eyesight was rated as same. Hearing was rated as slightly worse. Patient feels that their emotional and mental health rating is same. Patients states they are never, rarely angry. Patient states they are never, rarely unusually tired/fatigued. Pain experienced in the last 7 days has been none. Patient states that he has experienced no weight loss or gain in last 6 months.     Depression Screening:   PHQ-2 Score: 0      Fall Risk Screening:   In the past year, patient has experienced: no history of falling in past year      Home Safety:  Patient does not have  trouble with stairs inside or outside of their home. Patient has working smoke alarms and has working carbon monoxide detector. Home safety hazards include: none.     Nutrition:   Current diet is Regular.     Medications:   Patient is currently taking over-the-counter supplements. OTC medications include: see medication list. Patient is able to manage medications.     Activities of Daily Living (ADLs)/Instrumental Activities of Daily Living (IADLs):   Walk and transfer into and out of bed and chair?: Yes  Dress and groom yourself?: Yes    Bathe or shower yourself?: Yes    Feed yourself? Yes  Do your laundry/housekeeping?: Yes  Manage your money, pay your bills and track your expenses?: Yes  Make your own meals?: Yes    Do your own shopping?: Yes    Previous Hospitalizations:   Any hospitalizations or ED visits within the last 12 months?: No      Advance Care Planning:   Living will: Yes    Advanced directive: Yes      PREVENTIVE SCREENINGS      Cardiovascular Screening:    General: Screening Not Indicated and History Lipid Disorder      Diabetes Screening:     General: Screening Current      Colorectal Cancer Screening:     General: Screening Current      Prostate Cancer Screening:    General: Screening Current      Osteoporosis Screening:    General: Screening Not Indicated      Abdominal Aortic Aneurysm (AAA) Screening:    Risk factors include: age between 65-74 yo        General: Screening Not Indicated      Lung Cancer Screening:     General: Screening Not Indicated      Hepatitis C Screening:    General: Screening Current    Screening, Brief Intervention, and Referral to Treatment (SBIRT)    Screening  Typical number of drinks in a day: 0  Typical number of drinks in a week: 0  Interpretation: Low risk drinking behavior.    Social Drivers of Health     Financial Resource Strain: Low Risk  (12/28/2023)    Overall Financial Resource Strain (CARDIA)     Difficulty of Paying Living Expenses: Not hard at all   Food  "Insecurity: No Food Insecurity (1/3/2025)    Hunger Vital Sign     Worried About Running Out of Food in the Last Year: Never true     Ran Out of Food in the Last Year: Never true   Transportation Needs: No Transportation Needs (1/3/2025)    PRAPARE - Transportation     Lack of Transportation (Medical): No     Lack of Transportation (Non-Medical): No   Housing Stability: Low Risk  (1/3/2025)    Housing Stability Vital Sign     Unable to Pay for Housing in the Last Year: No     Number of Times Moved in the Last Year: 1     Homeless in the Last Year: No   Utilities: Not At Risk (1/3/2025)    [facility] Utilities     Threatened with loss of utilities: No     No results found.    Objective   /72 (BP Location: Left arm, Patient Position: Sitting, Cuff Size: Adult)   Pulse 72   Temp 97.7 °F (36.5 °C) (Temporal)   Ht 5' 11\" (1.803 m)   Wt 113 kg (248 lb 3.2 oz)   SpO2 99%   BMI 34.62 kg/m²     Physical Exam  Vitals reviewed.   Constitutional:       General: He is not in acute distress.     Appearance: Normal appearance. He is obese. He is not diaphoretic.   HENT:      Head: Normocephalic and atraumatic.      Right Ear: Tympanic membrane and external ear normal.      Left Ear: Tympanic membrane and external ear normal.      Mouth/Throat:      Mouth: Mucous membranes are moist.      Pharynx: Oropharynx is clear. No oropharyngeal exudate or posterior oropharyngeal erythema.   Eyes:      Extraocular Movements: Extraocular movements intact.      Conjunctiva/sclera: Conjunctivae normal.      Pupils: Pupils are equal, round, and reactive to light.   Neck:      Vascular: No carotid bruit.   Cardiovascular:      Rate and Rhythm: Normal rate and regular rhythm.      Heart sounds: Normal heart sounds. No murmur heard.  Pulmonary:      Effort: Pulmonary effort is normal. No respiratory distress.      Breath sounds: Normal breath sounds. No wheezing, rhonchi or rales.   Musculoskeletal:      Right lower leg: No edema.      Left " lower leg: No edema.   Neurological:      Mental Status: He is alert and oriented to person, place, and time. Mental status is at baseline.   Psychiatric:         Mood and Affect: Mood normal.         Behavior: Behavior normal.         Thought Content: Thought content normal.         Judgment: Judgment normal.

## 2025-01-13 DIAGNOSIS — E79.0 ELEVATED URIC ACID IN BLOOD: ICD-10-CM

## 2025-01-13 RX ORDER — ALLOPURINOL 100 MG/1
200 TABLET ORAL DAILY
Qty: 180 TABLET | Refills: 1 | Status: SHIPPED | OUTPATIENT
Start: 2025-01-13

## 2025-01-13 NOTE — TELEPHONE ENCOUNTER
- Patient needs a new script due to pharmacy somehow dispensing an old script that was for one a day.     Reason for call:   [x] Refill   [] Prior Auth  [] Other:     Office:   [x] PCP/Provider -   [] Specialty/Provider -     Medication:   - Allopurinol 100mg- take 2 tablets by mouth daily      Pharmacy: [facility]    Does the patient have enough for 3 days?   [] Yes   [x] No - Send as HP to POD

## 2025-02-13 DIAGNOSIS — I10 BENIGN ESSENTIAL HYPERTENSION: ICD-10-CM

## 2025-02-13 RX ORDER — LISINOPRIL 30 MG/1
30 TABLET ORAL DAILY
Qty: 90 TABLET | Refills: 1 | Status: SHIPPED | OUTPATIENT
Start: 2025-02-13

## 2025-05-14 DIAGNOSIS — E78.5 HYPERLIPIDEMIA, UNSPECIFIED HYPERLIPIDEMIA TYPE: ICD-10-CM

## 2025-05-14 DIAGNOSIS — I10 BENIGN ESSENTIAL HYPERTENSION: ICD-10-CM

## 2025-05-14 RX ORDER — DOXAZOSIN 8 MG/1
8 TABLET ORAL DAILY
Qty: 90 TABLET | Refills: 1 | Status: SHIPPED | OUTPATIENT
Start: 2025-05-14

## 2025-05-14 RX ORDER — ATORVASTATIN CALCIUM 20 MG/1
20 TABLET, FILM COATED ORAL DAILY
Qty: 90 TABLET | Refills: 1 | Status: SHIPPED | OUTPATIENT
Start: 2025-05-14

## 2025-06-26 ENCOUNTER — RA CDI HCC (OUTPATIENT)
Dept: RADIOLOGY | Facility: HOSPITAL | Age: 70
End: 2025-06-26

## 2025-07-01 ENCOUNTER — APPOINTMENT (OUTPATIENT)
Dept: LAB | Facility: MEDICAL CENTER | Age: 70
End: 2025-07-01
Payer: COMMERCIAL

## 2025-07-01 DIAGNOSIS — Z87.39 HISTORY OF GOUT: ICD-10-CM

## 2025-07-01 DIAGNOSIS — Z12.5 SCREENING FOR PROSTATE CANCER: ICD-10-CM

## 2025-07-01 DIAGNOSIS — E78.00 HYPERCHOLESTEROLEMIA: ICD-10-CM

## 2025-07-01 DIAGNOSIS — Z13.0 SCREENING FOR DEFICIENCY ANEMIA: ICD-10-CM

## 2025-07-01 LAB
ALBUMIN SERPL BCG-MCNC: 4.1 G/DL (ref 3.5–5)
ALP SERPL-CCNC: 120 U/L (ref 34–104)
ALT SERPL W P-5'-P-CCNC: 17 U/L (ref 7–52)
ANION GAP SERPL CALCULATED.3IONS-SCNC: 11 MMOL/L (ref 4–13)
AST SERPL W P-5'-P-CCNC: 20 U/L (ref 13–39)
BASOPHILS # BLD AUTO: 0.07 THOUSANDS/ÂΜL (ref 0–0.1)
BASOPHILS NFR BLD AUTO: 1 % (ref 0–1)
BILIRUB SERPL-MCNC: 0.7 MG/DL (ref 0.2–1)
BUN SERPL-MCNC: 18 MG/DL (ref 5–25)
CALCIUM SERPL-MCNC: 8.9 MG/DL (ref 8.4–10.2)
CHLORIDE SERPL-SCNC: 105 MMOL/L (ref 96–108)
CHOLEST SERPL-MCNC: 114 MG/DL (ref ?–200)
CO2 SERPL-SCNC: 23 MMOL/L (ref 21–32)
CREAT SERPL-MCNC: 1.16 MG/DL (ref 0.6–1.3)
EOSINOPHIL # BLD AUTO: 0.63 THOUSAND/ÂΜL (ref 0–0.61)
EOSINOPHIL NFR BLD AUTO: 8 % (ref 0–6)
ERYTHROCYTE [DISTWIDTH] IN BLOOD BY AUTOMATED COUNT: 13 % (ref 11.6–15.1)
GFR SERPL CREATININE-BSD FRML MDRD: 63 ML/MIN/1.73SQ M
GLUCOSE P FAST SERPL-MCNC: 99 MG/DL (ref 65–99)
HCT VFR BLD AUTO: 43.6 % (ref 36.5–49.3)
HDLC SERPL-MCNC: 41 MG/DL
HGB BLD-MCNC: 14.4 G/DL (ref 12–17)
IMM GRANULOCYTES # BLD AUTO: 0.02 THOUSAND/UL (ref 0–0.2)
IMM GRANULOCYTES NFR BLD AUTO: 0 % (ref 0–2)
LDLC SERPL CALC-MCNC: 52 MG/DL (ref 0–100)
LYMPHOCYTES # BLD AUTO: 2.03 THOUSANDS/ÂΜL (ref 0.6–4.47)
LYMPHOCYTES NFR BLD AUTO: 26 % (ref 14–44)
MCH RBC QN AUTO: 31.9 PG (ref 26.8–34.3)
MCHC RBC AUTO-ENTMCNC: 33 G/DL (ref 31.4–37.4)
MCV RBC AUTO: 97 FL (ref 82–98)
MONOCYTES # BLD AUTO: 0.69 THOUSAND/ÂΜL (ref 0.17–1.22)
MONOCYTES NFR BLD AUTO: 9 % (ref 4–12)
NEUTROPHILS # BLD AUTO: 4.32 THOUSANDS/ÂΜL (ref 1.85–7.62)
NEUTS SEG NFR BLD AUTO: 56 % (ref 43–75)
NRBC BLD AUTO-RTO: 0 /100 WBCS
PLATELET # BLD AUTO: 172 THOUSANDS/UL (ref 149–390)
PMV BLD AUTO: 11.3 FL (ref 8.9–12.7)
POTASSIUM SERPL-SCNC: 4.6 MMOL/L (ref 3.5–5.3)
PROT SERPL-MCNC: 6.8 G/DL (ref 6.4–8.4)
PSA SERPL-MCNC: 0.56 NG/ML (ref 0–4)
RBC # BLD AUTO: 4.51 MILLION/UL (ref 3.88–5.62)
SODIUM SERPL-SCNC: 139 MMOL/L (ref 135–147)
TRIGL SERPL-MCNC: 105 MG/DL (ref ?–150)
URATE SERPL-MCNC: 4.8 MG/DL (ref 3.5–8.5)
WBC # BLD AUTO: 7.76 THOUSAND/UL (ref 4.31–10.16)

## 2025-07-01 PROCEDURE — 80053 COMPREHEN METABOLIC PANEL: CPT

## 2025-07-01 PROCEDURE — 84550 ASSAY OF BLOOD/URIC ACID: CPT

## 2025-07-01 PROCEDURE — G0103 PSA SCREENING: HCPCS

## 2025-07-01 PROCEDURE — 36415 COLL VENOUS BLD VENIPUNCTURE: CPT

## 2025-07-01 PROCEDURE — 80061 LIPID PANEL: CPT

## 2025-07-01 PROCEDURE — 85025 COMPLETE CBC W/AUTO DIFF WBC: CPT

## 2025-07-02 ENCOUNTER — RA CDI HCC (OUTPATIENT)
Dept: OTHER | Facility: HOSPITAL | Age: 70
End: 2025-07-02

## 2025-07-08 DIAGNOSIS — E79.0 ELEVATED URIC ACID IN BLOOD: ICD-10-CM

## 2025-07-09 RX ORDER — ALLOPURINOL 100 MG/1
200 TABLET ORAL DAILY
Qty: 180 TABLET | Refills: 1 | Status: SHIPPED | OUTPATIENT
Start: 2025-07-09

## 2025-07-09 NOTE — TELEPHONE ENCOUNTER
Medication: allopurinol (ZYLOPRIM)    Dose/Frequency: 100 mg tablet; Take 2 tablets (200 mg total) by mouth daily     Quantity: 180    Pharmacy: [id number] - [address]     Office:   [x] PCP/Provider -   [] Speciality/Provider -     Does the patient have enough for 3 days?   [x] Yes   [] No - Send as HP to POD

## 2025-07-17 ENCOUNTER — OFFICE VISIT (OUTPATIENT)
Dept: INTERNAL MEDICINE CLINIC | Facility: OTHER | Age: 70
End: 2025-07-17
Payer: COMMERCIAL

## 2025-07-17 VITALS
OXYGEN SATURATION: 97 % | TEMPERATURE: 97.5 F | HEIGHT: 71 IN | BODY MASS INDEX: 34.86 KG/M2 | HEART RATE: 74 BPM | WEIGHT: 249 LBS | SYSTOLIC BLOOD PRESSURE: 120 MMHG | DIASTOLIC BLOOD PRESSURE: 60 MMHG

## 2025-07-17 DIAGNOSIS — E78.00 HYPERCHOLESTEROLEMIA: Primary | ICD-10-CM

## 2025-07-17 DIAGNOSIS — K64.8 OTHER HEMORRHOIDS: ICD-10-CM

## 2025-07-17 DIAGNOSIS — R73.03 PREDIABETES: ICD-10-CM

## 2025-07-17 DIAGNOSIS — I10 BENIGN ESSENTIAL HYPERTENSION: ICD-10-CM

## 2025-07-17 DIAGNOSIS — E79.0 ELEVATED URIC ACID IN BLOOD: ICD-10-CM

## 2025-07-17 PROCEDURE — G2211 COMPLEX E/M VISIT ADD ON: HCPCS | Performed by: NURSE PRACTITIONER

## 2025-07-17 PROCEDURE — 99214 OFFICE O/P EST MOD 30 MIN: CPT | Performed by: NURSE PRACTITIONER

## 2025-07-17 RX ORDER — HYDROCORTISONE 25 MG/G
CREAM TOPICAL 2 TIMES DAILY PRN
Qty: 28 G | Refills: 1 | Status: SHIPPED | OUTPATIENT
Start: 2025-07-17

## 2025-07-17 RX ORDER — LISINOPRIL 30 MG/1
30 TABLET ORAL DAILY
Qty: 90 TABLET | Refills: 1 | Status: SHIPPED | OUTPATIENT
Start: 2025-07-17

## 2025-07-17 NOTE — ASSESSMENT & PLAN NOTE
Uric acid normal 4.8, no recent gout flares  Continue allopurinol 200 mg daily  Follow-up in 6 months  Orders:    Uric acid; Future

## 2025-07-17 NOTE — ASSESSMENT & PLAN NOTE
Lipids at goal, LDL 52  Continue atorvastatin 20 mg daily and ASA 81 mg  Orders:    Lipid Panel with Direct LDL reflex; Future    Comprehensive metabolic panel; Future

## 2025-07-17 NOTE — PROGRESS NOTES
"Name: Carter Grover      : 1955      MRN: [id number]  Encounter Provider: SUDHEER Banda  Encounter Date: 2025   Encounter department: [facility]  :  Assessment & Plan  Benign essential hypertension  Blood pressure well-controlled on current regimen  Continue doxazosin 8 mg daily and lisinopril 30 mg daily  Follow-up in 6 months  Orders:    lisinopril (ZESTRIL) 30 mg tablet; Take 1 tablet (30 mg total) by mouth daily    Hypercholesterolemia  Lipids at goal, LDL 52  Continue atorvastatin 20 mg daily and ASA 81 mg  Orders:    Lipid Panel with Direct LDL reflex; Future    Comprehensive metabolic panel; Future    Elevated uric acid in blood  Uric acid normal 4.8, no recent gout flares  Continue allopurinol 200 mg daily  Follow-up in 6 months  Orders:    Uric acid; Future    Prediabetes    Orders:    Hemoglobin A1C; Future    Other hemorrhoids    Orders:    hydrocortisone (ANUSOL-HC) 2.5 % rectal cream; Apply topically 2 (two) times a day as needed for hemorrhoids           History of Present Illness   HPI    Patient presents today for 6 month follow up  Labs completed   Doing well, no concerns  PSA normal     HTN - not monitoring at home. Controlled in office     HDL - total cholesterol 114, triglucides 105, HDL 41, LDL 52    Uric acid 4.8. currently on allopurinol 200mg daily. No gout flares.   Review of Systems   Constitutional:  Negative for activity change, appetite change, chills and fever.   Eyes:  Negative for visual disturbance.   Respiratory:  Negative for cough, chest tightness, shortness of breath and wheezing.    Cardiovascular:  Negative for chest pain and leg swelling.   Neurological:  Negative for headaches.       Objective   /60 (BP Location: Left arm, Patient Position: Sitting, Cuff Size: Standard)   Pulse 74   Temp 97.5 °F (36.4 °C) (Tympanic)   Ht 5' 11\" (1.803 m)   Wt 113 kg (249 lb)   SpO2 97%   BMI 34.73 kg/m²      Physical " Exam  Vitals reviewed.   Constitutional:       General: He is not in acute distress.     Appearance: Normal appearance. He is obese. He is not diaphoretic.   HENT:      Head: Normocephalic and atraumatic.     Eyes:      Extraocular Movements: Extraocular movements intact.      Conjunctiva/sclera: Conjunctivae normal.      Pupils: Pupils are equal, round, and reactive to light.     Neck:      Vascular: No carotid bruit.     Cardiovascular:      Rate and Rhythm: Normal rate and regular rhythm.      Heart sounds: Normal heart sounds. No murmur heard.  Pulmonary:      Effort: Pulmonary effort is normal. No respiratory distress.      Breath sounds: Normal breath sounds. No wheezing, rhonchi or rales.     Neurological:      Mental Status: He is alert and oriented to person, place, and time. Mental status is at baseline.     Psychiatric:         Mood and Affect: Mood normal.         Behavior: Behavior normal.         Thought Content: Thought content normal.         Judgment: Judgment normal.

## 2025-07-17 NOTE — ASSESSMENT & PLAN NOTE
Blood pressure well-controlled on current regimen  Continue doxazosin 8 mg daily and lisinopril 30 mg daily  Follow-up in 6 months  Orders:    lisinopril (ZESTRIL) 30 mg tablet; Take 1 tablet (30 mg total) by mouth daily